# Patient Record
Sex: MALE | Race: WHITE | Employment: FULL TIME | URBAN - METROPOLITAN AREA
[De-identification: names, ages, dates, MRNs, and addresses within clinical notes are randomized per-mention and may not be internally consistent; named-entity substitution may affect disease eponyms.]

---

## 2017-07-24 ENCOUNTER — TELEPHONE (OUTPATIENT)
Dept: INTERNAL MEDICINE CLINIC | Age: 47
End: 2017-07-24

## 2017-07-24 RX ORDER — PREDNISONE 10 MG/1
10 TABLET ORAL SEE ADMIN INSTRUCTIONS
Qty: 21 TAB | Refills: 0 | Status: SHIPPED | OUTPATIENT
Start: 2017-07-24 | End: 2017-08-03

## 2017-08-02 PROBLEM — K76.0 FATTY LIVER: Status: ACTIVE | Noted: 2017-08-02

## 2017-08-02 PROBLEM — E78.5 HYPERLIPIDEMIA: Status: ACTIVE | Noted: 2017-08-02

## 2017-08-02 PROBLEM — M25.562 KNEE PAIN, LEFT: Status: ACTIVE | Noted: 2017-08-02

## 2017-08-02 PROBLEM — E55.9 VITAMIN D DEFICIENCY: Status: ACTIVE | Noted: 2017-08-02

## 2017-08-02 PROBLEM — R73.02 IGT (IMPAIRED GLUCOSE TOLERANCE): Status: ACTIVE | Noted: 2017-08-02

## 2017-08-02 PROBLEM — G47.00 INSOMNIA: Status: ACTIVE | Noted: 2017-08-02

## 2017-08-02 PROBLEM — M77.40 METATARSALGIA: Status: ACTIVE | Noted: 2017-08-02

## 2017-08-02 PROBLEM — M72.2 PLANTAR FASCIITIS: Status: ACTIVE | Noted: 2017-08-02

## 2017-08-02 PROBLEM — G47.33 OSA ON CPAP: Status: ACTIVE | Noted: 2017-08-02

## 2017-08-02 PROBLEM — J34.2 DEVIATED NASAL SEPTUM: Status: ACTIVE | Noted: 2017-08-02

## 2017-08-02 PROBLEM — J30.9 ALLERGIC RHINITIS: Status: ACTIVE | Noted: 2017-08-02

## 2017-08-02 PROBLEM — F32.A DEPRESSION: Status: ACTIVE | Noted: 2017-08-02

## 2017-08-02 PROBLEM — Z99.89 OSA ON CPAP: Status: ACTIVE | Noted: 2017-08-02

## 2017-08-02 PROBLEM — J45.909 ASTHMA: Status: ACTIVE | Noted: 2017-08-02

## 2017-08-02 RX ORDER — ROSUVASTATIN CALCIUM 5 MG/1
TABLET, COATED ORAL
COMMUNITY
End: 2017-11-20 | Stop reason: SDUPTHER

## 2017-08-02 RX ORDER — ASPIRIN 81 MG/1
81 TABLET ORAL DAILY
COMMUNITY

## 2017-08-02 RX ORDER — BISMUTH SUBSALICYLATE 262 MG
1 TABLET,CHEWABLE ORAL DAILY
COMMUNITY

## 2017-08-02 RX ORDER — HYDROCHLOROTHIAZIDE 25 MG/1
25 TABLET ORAL DAILY
COMMUNITY
End: 2018-01-26

## 2017-08-03 PROBLEM — I10 ESSENTIAL HYPERTENSION: Status: ACTIVE | Noted: 2017-08-03

## 2017-08-03 RX ORDER — ALBUTEROL SULFATE 90 UG/1
2 AEROSOL, METERED RESPIRATORY (INHALATION)
COMMUNITY

## 2017-08-03 RX ORDER — CHOLECALCIFEROL (VITAMIN D3) 125 MCG
2000 CAPSULE ORAL DAILY
COMMUNITY

## 2017-08-04 ENCOUNTER — OFFICE VISIT (OUTPATIENT)
Dept: INTERNAL MEDICINE CLINIC | Age: 47
End: 2017-08-04

## 2017-08-04 VITALS
BODY MASS INDEX: 44.09 KG/M2 | HEART RATE: 85 BPM | TEMPERATURE: 98.1 F | WEIGHT: 308 LBS | OXYGEN SATURATION: 96 % | SYSTOLIC BLOOD PRESSURE: 133 MMHG | HEIGHT: 70 IN | DIASTOLIC BLOOD PRESSURE: 88 MMHG

## 2017-08-04 DIAGNOSIS — I10 ESSENTIAL HYPERTENSION: Primary | ICD-10-CM

## 2017-08-04 DIAGNOSIS — R73.02 IGT (IMPAIRED GLUCOSE TOLERANCE): ICD-10-CM

## 2017-08-04 DIAGNOSIS — E78.5 HYPERLIPIDEMIA, UNSPECIFIED HYPERLIPIDEMIA TYPE: ICD-10-CM

## 2017-08-04 DIAGNOSIS — K76.0 FATTY LIVER: ICD-10-CM

## 2017-08-04 DIAGNOSIS — M54.12 CERVICAL RADICULOPATHY: ICD-10-CM

## 2017-08-04 PROBLEM — M17.10 OSTEOARTHROSIS, LOCALIZED, PRIMARY, KNEE: Status: ACTIVE | Noted: 2017-07-27

## 2017-08-04 PROBLEM — S83.231A COMPLEX TEAR OF MEDIAL MENISCUS OF RIGHT KNEE AS CURRENT INJURY: Status: ACTIVE | Noted: 2017-07-27

## 2017-08-04 LAB
ALBUMIN SERPL-MCNC: 4 G/DL (ref 3.9–5.4)
ALKALINE PHOS POC: 124 U/L (ref 38–126)
ALT SERPL-CCNC: 77 U/L (ref 9–52)
AST SERPL-CCNC: 42 U/L (ref 14–36)
BUN BLD-MCNC: 19 MG/DL (ref 9–20)
CALCIUM BLD-MCNC: 9.3 MG/DL (ref 8.4–10.2)
CHLORIDE BLD-SCNC: 104 MMOL/L (ref 98–107)
CO2 POC: 26 MMOL/L (ref 22–32)
CREAT BLD-MCNC: 1 MG/DL (ref 0.8–1.5)
EGFR (POC): 88
GLUCOSE POC: 106 MG/DL (ref 75–110)
POTASSIUM SERPL-SCNC: 4.7 MMOL/L (ref 3.6–5)
PROT SERPL-MCNC: 6.9 G/DL (ref 6.3–8.2)
SODIUM SERPL-SCNC: 143 MMOL/L (ref 137–145)
TOTAL BILIRUBIN POC: 0.7 MG/DL (ref 0.2–1.3)

## 2017-08-04 NOTE — PROGRESS NOTES
Reviewed record in preparation for visit and have obtained necessary documentation. Identified pt with two pt identifiers(name and ). Chief Complaint   Patient presents with    Hypertension     3mth f/u        [facility] Questionnaire:  :     1) Have you been to an emergency room, urgent care clinic since your last visit? no    Hospitalized since your last visit? no        2) Have you seen or consulted any other health care providers outside of [address] [place] [address] since your last visit? Yes, saw Dr William Red and had a mri of the rt knee.

## 2017-08-04 NOTE — PROGRESS NOTES
Subjective:   Amil Gottron is a 50 y.o. male      Chief Complaint   Patient presents with    Hypertension     3mth f/u        History of present illness:  Returns for check of BP which is good. Also to check weight which has not changed. Additionally notes left neck pain radiating into left arm with C spine Xrays showing degenerative changes at multiple levels. Patient Active Problem List   Diagnosis Code    Deviated nasal septum J34.2    TYRESE on CPAP G47.33, Z99.89    Allergic rhinitis J30.9    Plantar fasciitis M72.2    Hyperlipidemia E78.5    BMI 40.0-44.9, adult ([facility] Z68.41    Knee pain, left M25.562    Vitamin D deficiency E55.9    Metatarsalgia M77.40    IGT (impaired glucose tolerance) R73.02    Asthma J45.909    Depression F32.9    Fatty liver K76.0    Insomnia G47.00    Essential hypertension I10    Complex tear of medial meniscus of right knee as current injury S83.231A    Osteoarthrosis, localized, primary, knee M17.10      Past Medical History:   Diagnosis Date    Arthritis     Asthma 8/2/2017    BMI 40.0-44.9, adult ([facility] [address] [facility] 8/2/2017    Depression 8/2/2017    Deviated nasal septum 8/2/2017    Essential hypertension 8/3/2017    Fatty liver 8/2/2017    Hyperlipidemia 8/2/2017    Hypertension     IGT (impaired glucose tolerance) 8/2/2017    Insomnia 8/2/2017    Knee pain, left 8/2/2017    Metatarsalgia 8/2/2017    TYRESE on CPAP 8/2/2017    Plantar fasciitis 8/2/2017    Psychiatric disorder     anxiety and depression    Sleep apnea     cpap    Vitamin D deficiency 8/2/2017      Allergies   Allergen Reactions    Lisinopril Unknown (comments)      Family History   Problem Relation Age of Onset    High Cholesterol Mother     Cancer Father      lung    Other Father      lymphoma      Social History     Social History    Marital status: SINGLE     Spouse name: N/A    Number of children: N/A    Years of education: N/A     Occupational History    Not on file. Social History Main Topics    Smoking status: Never Smoker    Smokeless tobacco: Never Used    Alcohol use No    Drug use: Not on file    Sexual activity: Not on file     Other Topics Concern    Not on file     Social History Narrative     Prior to Admission medications    Medication Sig Start Date End Date Taking? Authorizing Provider   albuterol (PROVENTIL HFA, VENTOLIN HFA, PROAIR HFA) 90 mcg/actuation inhaler Take 2 Puffs by inhalation every four (4) hours as needed for Wheezing. Yes Historical Provider   cholecalciferol, vitamin D3, (VITAMIN D3) 2,000 unit tab Take 2,000 Units by mouth daily. Yes Historical Provider   rosuvastatin (CRESTOR) 5 mg tablet Take  by mouth nightly. Yes Historical Provider   hydroCHLOROthiazide (HYDRODIURIL) 25 mg tablet Take 25 mg by mouth daily. Yes Historical Provider   multivitamin (ONE A DAY) tablet Take 1 Tab by mouth daily. Yes Historical Provider   sodium chloride-aloe vera (AYR SALINE) topical gel Apply  to affected area once. Yes Historical Provider   aspirin delayed-release 81 mg tablet Take  by mouth daily. Yes Historical Provider   OTHER Indications: ALLERGY SHOTS   Yes Historical Provider   losartan (COZAAR) 100 mg tablet Take 100 mg by mouth daily. Indications: HYPERTENSION   Yes Historical Provider   amLODIPine (NORVASC) 5 mg tablet Take 5 mg by mouth daily. Indications: HYPERTENSION   Yes Historical Provider   buPROPion XL (WELLBUTRIN XL) 300 mg XL tablet Take 300 mg by mouth every morning. Indications: ANXIETY WITH DEPRESSION   Yes Historical Provider   meloxicam (MOBIC) 15 mg tablet Take 15 mg by mouth daily. Indications: OSTEOARTHRITIS   Yes Historical Provider   clonazePAM (KLONOPIN) 0.5 mg tablet Take  by mouth nightly as needed. Indications: PANIC DISORDER   Yes Historical Provider        Review of Systems              Constitutional:  He denies fever, weight loss, sweats or fatigue.   HEENT:  No blurred or double vision, headache or dizziness. No difficulty with swallowing, taste, speech or smell. Neck:  Left sided neck pain with radiation left arm  Respiratory:  No cough, wheezing or shortness of breath. No sputum production. Cardiac:  Denies chest pain, palpitations, unexplained indigestion, syncope, edema, PND or orthopnea. GI:  No changes in bowel movements, no abdominal pain, no bloating, anorexia, nausea, vomiting or heartburn. :  No frequency or dysuria. Denies incontinence. Extremities:  Bilateral knee pain, stiffness or swelling. Seeing Dr. Justice Gilliam  Skin:  No recent rashes or mole changes. Neurological:  No numbness, tingling, burning paresthesias or loss of motor strength. No syncope, dizziness, frequent headaches or memory loss. Objective:     Vitals:    08/04/17 0925   BP: 133/88   Pulse: 85   Temp: 98.1 °F (36.7 °C)   TempSrc: Oral   SpO2: 96%   Weight: 308 lb (139.7 kg)   Height: 5' 10\" (1.778 m)   PainSc:   5   PainLoc: Neck       Body mass index is 44.19 kg/(m^2). Physical Examination:              General Appearance:  Well-developed, well-nourished, no acute  distress. HEENT:      Ears:  The TMs and ear canals were clear. Eyes:  The pupillary responses were normal.  Extraocular muscle function intact. Lids and conjunctiva not injected. Neck:  Supple without thyromegaly or adenopathy. No JVD noted. Lungs:  Clear to auscultation and percussion. Cardiac:  Regular rate and rhythm without murmur. GI: nontender w/o mass. Normal BS's. Extremities:  No clubbing, cyanosis or edema. Skin:  No rash or unusual mole changes noted. Neurological:  Grossly normal.            Assessment/Plan:   Impressions:      ICD-10-CM ICD-9-CM    1. Essential hypertension I10 401.9 AMB POC COMPREHENSIVE METABOLIC PANEL   2. Hyperlipidemia, unspecified hyperlipidemia type E78.5 272.4 AMB POC COMPREHENSIVE METABOLIC PANEL   3.  IGT (impaired glucose tolerance) R73.02 790.22 AMB POC COMPREHENSIVE METABOLIC PANEL 4. Fatty liver K76.0 571.8 AMB POC COMPREHENSIVE METABOLIC PANEL   5. Cervical radiculopathy M54.12 723.4 XR SPINE CERV 4 OR 5 V      MRI CERV SPINE WO CONT        Plan:  1. Continue present meds  2. Lifestyle modifications including Na restriction, low carb/fat diet, weight reduction and exercise (at least a walking program). Follow-up Disposition:  Return for After procedure (MRI). Orders Placed This Encounter    XR SPINE CERV 4 OR 5 V     Standing Status:   Future     Number of Occurrences:   1     Standing Expiration Date:   9/4/2018     Order Specific Question:   Reason for Exam     Answer:   neck and left arm pain    MRI CERV SPINE WO CONT     Standing Status:   Future     Standing Expiration Date:   9/4/2018     Order Specific Question:   Is Patient Allergic to Contrast Dye?      Answer:   No    AMB POC COMPREHENSIVE METABOLIC PANEL       Rebeca Coyle MD

## 2017-08-12 ENCOUNTER — HOSPITAL ENCOUNTER (OUTPATIENT)
Dept: MRI IMAGING | Age: 47
Discharge: HOME | End: 2017-08-12
Attending: INTERNAL MEDICINE
Payer: COMMERCIAL

## 2017-08-12 DIAGNOSIS — M54.12 CERVICAL RADICULOPATHY: ICD-10-CM

## 2017-08-12 PROCEDURE — 72141 MRI NECK SPINE W/O DYE: CPT

## 2017-08-17 ENCOUNTER — OFFICE VISIT (OUTPATIENT)
Dept: INTERNAL MEDICINE CLINIC | Age: 47
End: 2017-08-17

## 2017-08-17 VITALS
WEIGHT: 308 LBS | TEMPERATURE: 98.2 F | SYSTOLIC BLOOD PRESSURE: 128 MMHG | DIASTOLIC BLOOD PRESSURE: 80 MMHG | HEIGHT: 70 IN | HEART RATE: 90 BPM | OXYGEN SATURATION: 96 % | BODY MASS INDEX: 44.09 KG/M2

## 2017-08-17 DIAGNOSIS — M54.12 CERVICAL RADICULOPATHY AT C6: Primary | ICD-10-CM

## 2017-08-17 NOTE — PROGRESS NOTES
Subjective:   Jennifer Bojorquez is a 50 y.o. male      Chief Complaint   Patient presents with    Follow-up     mri        History of present illness: Mr. Manuel Mcclellan returns in follow up. He continues to have problems with pain radiating from his left neck down the posterior aspect of his left arm. He feels that there may be some weakness. He notes numbness and tingling that extends out into the tip of his third finger as well. Cervical spine MRI revealed a disc protrusion at C6-7, which I think is the culprit area. With persistent symptoms that did not respond to a dose pack, I think he should see the neurosurgeon early. The choice can then be made as to whether conservative therapy, including physical therapy, can be followed, versus a surgical approach. He has not done much in terms of exercise and weight loss. Blood pressure is adequate today, however. He really needs to get working on his weight as his knee continues to be a problem as well and at this point the orthopedist does not feel it is worthy of surgery and that his best bet is weight reduction to improve the knee. We will let him see neurosurgery. He will work on his weight. He will be back here in November for his comprehensive examination.         Patient Active Problem List   Diagnosis Code    Deviated nasal septum J34.2    TYRESE on CPAP G47.33, Z99.89    Allergic rhinitis J30.9    Plantar fasciitis M72.2    Hyperlipidemia E78.5    BMI 40.0-44.9, adult ([facility] [address] Z68.41    Knee pain, left M25.562    Vitamin D deficiency E55.9    Metatarsalgia M77.40    IGT (impaired glucose tolerance) R73.02    Asthma J45.909    Depression F32.9    Fatty liver K76.0    Insomnia G47.00    Essential hypertension I10    Complex tear of medial meniscus of right knee as current injury S83.231A    Osteoarthrosis, localized, primary, knee M17.10      Past Medical History:   Diagnosis Date    Arthritis     Asthma 8/2/2017    BMI 40.0-44.9, adult ([address] 8/2/2017    Depression 8/2/2017    Deviated nasal septum 8/2/2017    Essential hypertension 8/3/2017    Fatty liver 8/2/2017    Hyperlipidemia 8/2/2017    Hypertension     IGT (impaired glucose tolerance) 8/2/2017    Insomnia 8/2/2017    Knee pain, left 8/2/2017    Metatarsalgia 8/2/2017    TYRESE on CPAP 8/2/2017    Plantar fasciitis 8/2/2017    Psychiatric disorder     anxiety and depression    Sleep apnea     cpap    Vitamin D deficiency 8/2/2017      Allergies   Allergen Reactions    Lisinopril Unknown (comments)      Family History   Problem Relation Age of Onset    High Cholesterol Mother     Cancer Father      lung    Other Father      lymphoma      Social History     Social History    Marital status: SINGLE     Spouse name: N/A    Number of children: N/A    Years of education: N/A     Occupational History    Not on file. Social History Main Topics    Smoking status: Never Smoker    Smokeless tobacco: Never Used    Alcohol use No    Drug use: Not on file    Sexual activity: Not on file     Other Topics Concern    Not on file     Social History Narrative     Prior to Admission medications    Medication Sig Start Date End Date Taking? Authorizing Provider   albuterol (PROVENTIL HFA, VENTOLIN HFA, PROAIR HFA) 90 mcg/actuation inhaler Take 2 Puffs by inhalation every four (4) hours as needed for Wheezing. Yes Historical Provider   cholecalciferol, vitamin D3, (VITAMIN D3) 2,000 unit tab Take 2,000 Units by mouth daily. Yes Historical Provider   rosuvastatin (CRESTOR) 5 mg tablet Take  by mouth nightly. Yes Historical Provider   hydroCHLOROthiazide (HYDRODIURIL) 25 mg tablet Take 25 mg by mouth daily. Yes Historical Provider   multivitamin (ONE A DAY) tablet Take 1 Tab by mouth daily. Yes Historical Provider   sodium chloride-aloe vera (AYR SALINE) topical gel Apply  to affected area once. Yes Historical Provider   aspirin delayed-release 81 mg tablet Take  by mouth daily. Yes Historical Provider   OTHER Indications: ALLERGY SHOTS   Yes Historical Provider   losartan (COZAAR) 100 mg tablet Take 100 mg by mouth daily. Indications: HYPERTENSION   Yes Historical Provider   amLODIPine (NORVASC) 5 mg tablet Take 5 mg by mouth daily. Indications: HYPERTENSION   Yes Historical Provider   buPROPion XL (WELLBUTRIN XL) 300 mg XL tablet Take 300 mg by mouth every morning. Indications: ANXIETY WITH DEPRESSION   Yes Historical Provider   meloxicam (MOBIC) 15 mg tablet Take 15 mg by mouth daily. Indications: OSTEOARTHRITIS   Yes Historical Provider   clonazePAM (KLONOPIN) 0.5 mg tablet Take  by mouth nightly as needed. Indications: PANIC DISORDER   Yes Historical Provider        Review of Systems              Constitutional:  He denies fever, weight loss, sweats or fatigue. HEENT:  No blurred or double vision, headache or dizziness. No difficulty with swallowing, taste, speech or smell. Respiratory:  No cough, wheezing or shortness of breath. No sputum production. Cardiac:  Denies chest pain, palpitations, unexplained indigestion, syncope, edema, PND or orthopnea. GI:  No changes in bowel movements, no abdominal pain, no bloating, anorexia, nausea, vomiting or heartburn. :  No frequency or dysuria. Denies incontinence. Extremities:  No joint pain, stiffness or swelling. Skin:  No recent rashes or mole changes. Neurological:  Numbness and pain in radicular distribution left posterior arm    Objective:     Vitals:    08/17/17 1558   BP: 128/80   Pulse: 90   Temp: 98.2 °F (36.8 °C)   TempSrc: Oral   SpO2: 96%   Weight: 308 lb (139.7 kg)   Height: 5' 10\" (1.778 m)       Body mass index is 44.19 kg/(m^2). Physical Examination:              General Appearance:  Well-developed, well-nourished, no acute  distress. HEENT:      Ears:  The TMs and ear canals were clear. Eyes:  The pupillary responses were normal.  Extraocular muscle function intact.   Lids and conjunctiva not injected. Neck:  Supple without thyromegaly or adenopathy. No JVD noted. Lungs:  Clear to auscultation and percussion. Cardiac:  Regular rate and rhythm without murmur. GI: nontender w/o mass. Normal BS's. Extremities:  No clubbing, cyanosis or edema. Skin:  No rash or unusual mole changes noted. Neurological:  Weakness left biceps. Assessment/Plan:   Impressions:      ICD-10-CM ICD-9-CM    1. Cervical radiculopathy at C6 M54.12 723.4 REFERRAL TO NEUROSURGERY        Plan:  1. Continue present meds  2. Lifestyle modifications including Na restriction, low carb/fat diet, weight reduction and exercise (at least a walking program). Follow-up Disposition:  Return in about 3 months (around 11/17/2017) for CPE.       Orders Placed This Encounter    REFERRAL TO NEUROSURGERY     Referral Priority:   Routine     Referral Type:   Consultation     Referral Reason:   Specialty Services Required     Requested Specialty:   Neurosurgery       Iveth Andres MD

## 2017-08-17 NOTE — PROGRESS NOTES
1. Have you been to the ER, urgent care clinic since your last visit? Hospitalized since your last visit? No    2. Have you seen or consulted any other health care providers outside of the [address] [place] [address] since your last visit? Include any pap smears or colon screening.  No

## 2017-09-06 RX ORDER — BUPROPION HYDROCHLORIDE 300 MG/1
TABLET ORAL
Qty: 90 TAB | Refills: 1 | Status: SHIPPED | OUTPATIENT
Start: 2017-09-06 | End: 2018-02-22 | Stop reason: SDUPTHER

## 2017-10-10 RX ORDER — MELOXICAM 15 MG/1
TABLET ORAL
Qty: 30 TAB | Refills: 3 | Status: SHIPPED | OUTPATIENT
Start: 2017-10-10 | End: 2018-01-08 | Stop reason: SDUPTHER

## 2017-10-12 RX ORDER — CLONAZEPAM 0.5 MG/1
0.5 TABLET ORAL
Qty: 60 TAB | Refills: 3 | Status: SHIPPED | OUTPATIENT
Start: 2017-10-12 | End: 2017-11-09

## 2017-11-02 ENCOUNTER — LAB ONLY (OUTPATIENT)
Dept: INTERNAL MEDICINE CLINIC | Age: 47
End: 2017-11-02

## 2017-11-02 DIAGNOSIS — R73.02 IGT (IMPAIRED GLUCOSE TOLERANCE): ICD-10-CM

## 2017-11-02 DIAGNOSIS — Z00.00 ROUTINE GENERAL MEDICAL EXAMINATION AT A HEALTH CARE FACILITY: Primary | ICD-10-CM

## 2017-11-02 DIAGNOSIS — E78.5 HYPERLIPIDEMIA, UNSPECIFIED HYPERLIPIDEMIA TYPE: ICD-10-CM

## 2017-11-02 LAB
ALBUMIN SERPL-MCNC: 4.4 G/DL (ref 3.9–5.4)
ALKALINE PHOS POC: 119 U/L (ref 38–126)
ALT SERPL-CCNC: 73 U/L (ref 9–52)
AST SERPL-CCNC: 37 U/L (ref 14–36)
BACTERIA UA POCT, BACTPOCT: NORMAL
BILIRUB UR QL STRIP: NEGATIVE
BUN BLD-MCNC: 17 MG/DL (ref 9–20)
CALCIUM BLD-MCNC: 9.4 MG/DL (ref 8.4–10.2)
CASTS UA POCT: NORMAL
CHLORIDE BLD-SCNC: 102 MMOL/L (ref 98–107)
CHOLEST SERPL-MCNC: 98 MG/DL (ref 0–200)
CK (CPK) POC: 120 U/L (ref 30–135)
CLUE CELLS, CLUEPOCT: NEGATIVE
CO2 POC: 28 MMOL/L (ref 22–32)
CREAT BLD-MCNC: 1.1 MG/DL (ref 0.8–1.5)
CRYSTALS UA POCT, CRYSPOCT: NEGATIVE
EGFR (POC): 78.4
EPITHELIAL CELLS POCT: NORMAL
GLUCOSE POC: 108 MG/DL (ref 75–110)
GLUCOSE UR-MCNC: NEGATIVE MG/DL
GRAN# POC: 4.4 K/UL (ref 2–7.8)
GRAN% POC: 67.8 % (ref 37–92)
HBA1C MFR BLD HPLC: 6.1 % (ref 4.5–5.7)
HCT VFR BLD CALC: 49.1 % (ref 37–51)
HDLC SERPL-MCNC: 36 MG/DL (ref 35–130)
HGB BLD-MCNC: 16.3 G/DL (ref 12–18)
IRON POC: 74 UG/DL (ref 49–181)
IRON SATURATION POC: 16 % (ref 15–55)
KETONES P FAST UR STRIP-MCNC: NEGATIVE MG/DL
LDL CHOLESTEROL POC: 42.8 MG/DL (ref 0–130)
LY# POC: 1.7 K/UL (ref 0.6–4.1)
LY% POC: 28 % (ref 10–58.5)
MCH RBC QN: 29.1 PG (ref 26–32)
MCHC RBC-ENTMCNC: 33.1 G/DL (ref 30–36)
MCV RBC: 88 FL (ref 80–97)
MID #, POC: 0.2 K/UL (ref 0–1.8)
MID% POC: 4.2 % (ref 0.1–24)
MUCUS UA POCT, MUCPOCT: NORMAL
PH UR STRIP: 6 [PH] (ref 5–7)
PLATELET # BLD: 207 K/UL (ref 140–440)
POTASSIUM SERPL-SCNC: 3.8 MMOL/L (ref 3.6–5)
PROT SERPL-MCNC: 7.2 G/DL (ref 6.3–8.2)
PROT UR QL STRIP: NEGATIVE MG/DL
PSA SERPL-MCNC: 0.3 NG/ML (ref 0–4)
RBC # BLD: 5.59 M/UL (ref 4.2–6.3)
RBC UA POCT, RBCPOCT: NORMAL
SODIUM SERPL-SCNC: 143 MMOL/L (ref 137–145)
SP GR UR STRIP: 1.02 (ref 1.01–1.02)
TCHOL/HDL RATIO (POC): 2.7 (ref 0–4)
TIBC POC: 452 UG/DL (ref 260–462)
TOTAL BILIRUBIN POC: 0.5 MG/DL (ref 0.2–1.3)
TRICH UA POCT, TRICHPOC: NEGATIVE
TRIGL SERPL-MCNC: 96 MG/DL (ref 0–200)
TSH BLD-ACNC: 2.09 UIU/ML (ref 0.4–4.2)
UA UROBILINOGEN AMB POC: NORMAL (ref 0.2–1)
URINALYSIS CLARITY POC: CLEAR
URINALYSIS COLOR POC: NORMAL
URINE BLOOD POC: NEGATIVE
URINE CULT COMMENT, POCT: NORMAL
URINE LEUKOCYTES POC: NEGATIVE
URINE NITRITES POC: NEGATIVE
VITAMIN D POC: 45 NG/ML (ref 30–96)
VLDLC SERPL CALC-MCNC: 19.2 MG/DL
WBC # BLD: 6.3 K/UL (ref 4.1–10.9)
WBC UA POCT, WBCPOCT: NORMAL
YEAST UA POCT, YEASTPOC: NEGATIVE

## 2017-11-07 PROBLEM — M54.12 CERVICAL RADICULOPATHY: Status: ACTIVE | Noted: 2017-11-07

## 2017-11-09 ENCOUNTER — OFFICE VISIT (OUTPATIENT)
Dept: INTERNAL MEDICINE CLINIC | Age: 47
End: 2017-11-09

## 2017-11-09 VITALS
SYSTOLIC BLOOD PRESSURE: 128 MMHG | DIASTOLIC BLOOD PRESSURE: 84 MMHG | OXYGEN SATURATION: 97 % | RESPIRATION RATE: 14 BRPM | WEIGHT: 301 LBS | TEMPERATURE: 98.7 F | HEART RATE: 80 BPM | BODY MASS INDEX: 43.09 KG/M2 | HEIGHT: 70 IN

## 2017-11-09 DIAGNOSIS — Z23 ENCOUNTER FOR IMMUNIZATION: Primary | ICD-10-CM

## 2017-11-09 DIAGNOSIS — R73.02 IGT (IMPAIRED GLUCOSE TOLERANCE): ICD-10-CM

## 2017-11-09 DIAGNOSIS — I10 ESSENTIAL HYPERTENSION: ICD-10-CM

## 2017-11-09 DIAGNOSIS — G47.33 OSA ON CPAP: ICD-10-CM

## 2017-11-09 DIAGNOSIS — F50.9 EATING DISORDER: ICD-10-CM

## 2017-11-09 DIAGNOSIS — Z99.89 OSA ON CPAP: ICD-10-CM

## 2017-11-09 DIAGNOSIS — E78.5 HYPERLIPIDEMIA, UNSPECIFIED HYPERLIPIDEMIA TYPE: ICD-10-CM

## 2017-11-09 RX ORDER — CLONAZEPAM 0.5 MG/1
1 TABLET ORAL
COMMUNITY
End: 2017-11-20 | Stop reason: SDUPTHER

## 2017-11-09 RX ORDER — TOPIRAMATE 25 MG/1
TABLET ORAL
Qty: 60 TAB | Refills: 0 | Status: SHIPPED | OUTPATIENT
Start: 2017-11-09 | End: 2017-12-18 | Stop reason: SDUPTHER

## 2017-11-09 NOTE — PROGRESS NOTES
Jermaine Jeter who present for routine immunization, flu shot. He denies any symptoms , reactions or allergies that would exclude them from being immunized today. Risks and adverse reactions were discussed and the VIS was given to them. All questions were addressed. He was observed for 15 min post injection. There were no reactions observed. Calista Hansen LPN  . Reviewed record in preparation for visit and have obtained necessary documentation. Identified pt with two pt identifiers(name and ). Neck Circ (inches): 17.5    Waist Measurement: 52 Inches    Chief Complaint   Patient presents with    Complete Physical        Coordination of Care Questionnaire:  :     1) Have you been to an emergency room, urgent care clinic since your last visit? no   Hospitalized since your last visit? no             2) Have you seen or consulted any other health care providers outside of [address] [place] [address] since your last visit?  Yes, Dr Gi Fernández

## 2017-11-09 NOTE — PATIENT INSTRUCTIONS
Vaccine Information Statement    Influenza (Flu) Vaccine (Inactivated or Recombinant): What you need to know    Many Vaccine Information Statements are available in Sets[place] and other languages. See www.immunize.org/vis  Hojas de Información Sobre Vacunas están disponibles en Español y en muchos otros idiomas. Visite www.immunize.org/vis    1. Why get vaccinated? Influenza (flu) is a contagious disease that spreads around the [place] every year, usually between October and May. Flu is caused by influenza viruses, and is spread mainly by coughing, sneezing, and close contact. Anyone can get flu. Flu strikes suddenly and can last several days. Symptoms vary by age, but can include:   fever/chills   sore throat   muscle aches   fatigue   cough   headache    runny or stuffy nose    Flu can also lead to pneumonia and blood infections, and cause diarrhea and seizures in children. If you have a medical condition, such as heart or lung disease, flu can make it worse. Flu is more dangerous for some people. Infants and young children, people 72years of age and older, pregnant women, and people with certain health conditions or a weakened immune system are at greatest risk. Each year thousands of people in the [facility] die from flu, and many more are hospitalized. Flu vaccine can:   keep you from getting flu,   make flu less severe if you do get it, and   keep you from spreading flu to your family and other people. 2. Inactivated and recombinant flu vaccines    A dose of flu vaccine is recommended every flu season. Children 6 months through 6years of age may need two doses during the same flu season. Everyone else needs only one dose each flu season.        Some inactivated flu vaccines contain a very small amount of a mercury-based preservative called thimerosal. Studies have not shown thimerosal in vaccines to be harmful, but flu vaccines that do not contain thimerosal are available. There is no live flu virus in flu shots. They cannot cause the flu. There are many flu viruses, and they are always changing. Each year a new flu vaccine is made to protect against three or four viruses that are likely to cause disease in the upcoming flu season. But even when the vaccine doesnt exactly match these viruses, it may still provide some protection    Flu vaccine cannot prevent:   flu that is caused by a virus not covered by the vaccine, or   illnesses that look like flu but are not. It takes about 2 weeks for protection to develop after vaccination, and protection lasts through the flu season. 3. Some people should not get this vaccine    Tell the person who is giving you the vaccine:     If you have any severe, life-threatening allergies. If you ever had a life-threatening allergic reaction after a dose of flu vaccine, or have a severe allergy to any part of this vaccine, you may be advised not to get vaccinated. Most, but not all, types of flu vaccine contain a small amount of egg protein.  If you ever had Guillain-Barré Syndrome (also called GBS). Some people with a history of GBS should not get this vaccine. This should be discussed with your doctor.  If you are not feeling well. It is usually okay to get flu vaccine when you have a mild illness, but you might be asked to come back when you feel better. 4. Risks of a vaccine reaction    With any medicine, including vaccines, there is a chance of reactions. These are usually mild and go away on their own, but serious reactions are also possible. Most people who get a flu shot do not have any problems with it.      Minor problems following a flu shot include:    soreness, redness, or swelling where the shot was given     hoarseness   sore, red or itchy eyes   cough   fever   aches   headache   itching   fatigue  If these problems occur, they usually begin soon after the shot and last 1 or 2 days. More serious problems following a flu shot can include the following:     There may be a small increased risk of Guillain-Barré Syndrome (GBS) after inactivated flu vaccine. This risk has been estimated at 1 or 2 additional cases per million people vaccinated. This is much lower than the risk of severe complications from flu, which can be prevented by flu vaccine.  Young children who get the flu shot along with pneumococcal vaccine (PCV13) and/or DTaP vaccine at the same time might be slightly more likely to have a seizure caused by fever. Ask your doctor for more information. Tell your doctor if a child who is getting flu vaccine has ever had a seizure. Problems that could happen after any injected vaccine:      People sometimes faint after a medical procedure, including vaccination. Sitting or lying down for about 15 minutes can help prevent fainting, and injuries caused by a fall. Tell your doctor if you feel dizzy, or have vision changes or ringing in the ears.  Some people get severe pain in the shoulder and have difficulty moving the arm where a shot was given. This happens very rarely.  Any medication can cause a severe allergic reaction. Such reactions from a vaccine are very rare, estimated at about 1 in a million doses, and would happen within a few minutes to a few hours after the vaccination. As with any medicine, there is a very remote chance of a vaccine causing a serious injury or death. The safety of vaccines is always being monitored. For more information, visit: www.cdc.gov/vaccinesafety/    5. What if there is a serious reaction? What should I look for?  Look for anything that concerns you, such as signs of a severe allergic reaction, very high fever, or unusual behavior.     Signs of a severe allergic reaction can include hives, swelling of the face and throat, difficulty breathing, a fast heartbeat, dizziness, and weakness - usually within a few minutes to a few hours after the vaccination. What should I do?  If you think it is a severe allergic reaction or other emergency that cant wait, call 9-1-1 and get the person to the nearest hospital. Otherwise, call your doctor.  Reactions should be reported to the Vaccine Adverse Event Reporting System (VAERS). Your doctor should file this report, or you can do it yourself through  the VAERS web site at www.vaers. [facility].gov, or by calling 0-496.449.4021. VAERS does not give medical advice. 6. The National Vaccine Injury Compensation Program    The [facility] Vaccine Injury Compensation Program (VICP) is a federal program that was created to compensate people who may have been injured by certain vaccines. Persons who believe they may have been injured by a vaccine can learn about the program and about filing a claim by calling 8-346.697.6541 or visiting the [employer] website at www.[facility].gov/vaccinecompensation. There is a time limit to file a claim for compensation. 7. How can I learn more?  Ask your healthcare provider. He or she can give you the vaccine package insert or suggest other sources of information.  Call your local or state health department.  Contact the Centers for Disease Control and Prevention (CDC):  - Call 2-301.490.3354 (1-800-CDC-INFO) or  - Visit CDCs website at www.cdc.gov/flu    Vaccine Information Statement   Inactivated Influenza Vaccine   8/7/2015  [address]ia Abernathy [id number]    Department of Health and Human Services  Centers for Disease Control and Prevention    Office Use Only

## 2017-11-20 RX ORDER — CLONAZEPAM 0.5 MG/1
1 TABLET ORAL
Qty: 180 TAB | Refills: 3 | Status: SHIPPED | OUTPATIENT
Start: 2017-11-20 | End: 2018-08-18 | Stop reason: SDUPTHER

## 2017-11-20 RX ORDER — ROSUVASTATIN CALCIUM 5 MG/1
TABLET, COATED ORAL
Qty: 30 TAB | Refills: 7 | Status: SHIPPED | OUTPATIENT
Start: 2017-11-20 | End: 2018-05-18

## 2017-11-20 NOTE — TELEPHONE ENCOUNTER
Requested Prescriptions     Pending Prescriptions Disp Refills    clonazePAM (KLONOPIN) 0.5 mg tablet 180 Tab 3     Sig: Take 2 Tabs by mouth nightly as needed. Max Daily Amount: 1 mg.

## 2017-12-10 PROBLEM — Z00.00 PE (PHYSICAL EXAM), ANNUAL: Status: ACTIVE | Noted: 2017-12-10

## 2017-12-11 ENCOUNTER — OFFICE VISIT (OUTPATIENT)
Dept: INTERNAL MEDICINE CLINIC | Age: 47
End: 2017-12-11

## 2017-12-11 VITALS
SYSTOLIC BLOOD PRESSURE: 118 MMHG | HEART RATE: 80 BPM | BODY MASS INDEX: 42.15 KG/M2 | OXYGEN SATURATION: 97 % | HEIGHT: 70 IN | DIASTOLIC BLOOD PRESSURE: 82 MMHG | WEIGHT: 294.4 LBS | TEMPERATURE: 98 F

## 2017-12-11 DIAGNOSIS — I10 ESSENTIAL HYPERTENSION: Primary | ICD-10-CM

## 2017-12-11 DIAGNOSIS — S83.231D COMPLEX TEAR OF MEDIAL MENISCUS OF RIGHT KNEE AS CURRENT INJURY, SUBSEQUENT ENCOUNTER: ICD-10-CM

## 2017-12-11 NOTE — PROGRESS NOTES
Reviewed record in preparation for visit and have obtained necessary documentation. Identified pt with two pt identifiers(name and ). No chief complaint on file. Coordination of Care Questionnaire:  :     1) Have you been to an emergency room, urgent care clinic since your last visit? No     Hospitalized since your last visit? No               2) Have you seen or consulted any other health care providers outside of [address] [place] [address] since your last visit?  No

## 2017-12-11 NOTE — PROGRESS NOTES
Subjective:   Sweta Ferraro is a 50 y.o. male      No chief complaint on file. History of present illness:  Raffaele Funes returns in follow up. He's done remarkably well with another 7 pound weight reduction since starting the Topamax. He can identify that his appetite is less. He's not exercising still because of his knee issues. He did see Dr. Deacon Thornton after his last visit here. Dr. Deacon Thornton told him that it was our call as to when he would proceed with surgery, but both Raffaele Funes and I feel that if he can get further weight loss that would be beneficial to him before the timing of the surgery. He has really only been taking Topamax once a day as he's finding it difficult to take it twice a day and concerned that taking it in the morning might make him drowsy. I told him to go ahead and take it twice a day and see if he can get more benefit from it that way. I don't think it will cause him that much drowsiness in the morning, but he'll see. If it does cause drowsiness I would increase it to the full 50 mg at bedtime. He'll check back here in a month's time and see if we have gotten further weight loss and continued success. He denies other new cardiorespiratory, GI or  symptoms.         Patient Active Problem List    Diagnosis Date Noted    Essential hypertension 08/03/2017     Priority: 1 - One    Hyperlipidemia 08/02/2017     Priority: 1 - One    Cervical radiculopathy 11/07/2017     Priority: 2 - Two    TYRESE on CPAP 08/02/2017     Priority: 2 - Two    Knee pain, left 08/02/2017     Priority: 2 - Two    IGT (impaired glucose tolerance) 08/02/2017     Priority: 2 - Two    Depression 08/02/2017     Priority: 2 - Two    Fatty liver 08/02/2017     Priority: 2 - Two    Allergic rhinitis 08/02/2017     Priority: 3 - Three    Plantar fasciitis 08/02/2017     Priority: 3 - Three    BMI 40.0-44.9, adult ([facility] [address] 08/02/2017     Priority: 3 - Three    Asthma 08/02/2017     Priority: 3 - Three    Complex tear of medial meniscus of right knee as current injury 07/27/2017     Priority: 3 - Three    Osteoarthrosis, localized, primary, knee 07/27/2017     Priority: 3 - Three    Deviated nasal septum 08/02/2017     Priority: 4 - Four    Vitamin D deficiency 08/02/2017     Priority: 4 - Four    Metatarsalgia 08/02/2017     Priority: 4 - Four    Insomnia 08/02/2017     Priority: 4 - Four    PE (physical exam), annual 12/10/2017      Past Surgical History:   Procedure Laterality Date    HX KNEE ARTHROSCOPY Left     med menisectomy    HX ORTHOPAEDIC  1999 2006    bilateral shoulder decompressions    HX REFRACTIVE SURGERY Bilateral 2006      Allergies   Allergen Reactions    Lisinopril Unknown (comments)      Family History   Problem Relation Age of Onset    High Cholesterol Mother     Cancer Father      lung    Other Father      lymphoma      Social History     Social History    Marital status: SINGLE     Spouse name: N/A    Number of children: N/A    Years of education: N/A     Occupational History    Not on file. Social History Main Topics    Smoking status: Never Smoker    Smokeless tobacco: Never Used    Alcohol use No    Drug use: Not on file    Sexual activity: Not on file     Other Topics Concern    Not on file     Social History Narrative     Outpatient Prescriptions Marked as Taking for the 12/11/17 encounter (Office Visit) with Karen Avila MD   Medication Sig Dispense Refill    clonazePAM (KLONOPIN) 0.5 mg tablet Take 2 Tabs by mouth nightly as needed. Max Daily Amount: 1 mg.  180 Tab 3    rosuvastatin (CRESTOR) 5 mg tablet TAKE ONE TABLET BY MOUTH EVERY DAY 30 Tab 7    topiramate (TOPAMAX) 25 mg tablet 1 tab at bedtime x 2 weeks then 1 tab twice a day 60 Tab 0    meloxicam (MOBIC) 15 mg tablet TAKE 1 TABLET EVERY DAY 30 Tab 3    buPROPion XL (WELLBUTRIN XL) 300 mg XL tablet TAKE 1 TABLET BY MOUTH DAILY 90 Tab 1    albuterol (PROVENTIL HFA, VENTOLIN HFA, PROAIR HFA) 90 mcg/actuation inhaler Take 2 Puffs by inhalation every four (4) hours as needed for Wheezing.  cholecalciferol, vitamin D3, (VITAMIN D3) 2,000 unit tab Take 2,000 Units by mouth daily.  hydroCHLOROthiazide (HYDRODIURIL) 25 mg tablet Take 25 mg by mouth daily.  multivitamin (ONE A DAY) tablet Take 1 Tab by mouth daily.  sodium chloride-aloe vera (AYR SALINE) topical gel Apply  to affected area once.  aspirin delayed-release 81 mg tablet Take 81 mg by mouth daily.  OTHER Indications: ALLERGY SHOTS      losartan (COZAAR) 100 mg tablet Take 100 mg by mouth daily. Indications: HYPERTENSION      amLODIPine (NORVASC) 5 mg tablet Take 5 mg by mouth daily. Indications: HYPERTENSION          Review of Systems              Constitutional:  He denies fever, weight loss, sweats or fatigue. HEENT:  No blurred or double vision, headache or dizziness. No difficulty with swallowing, taste, speech or smell. Respiratory:  No cough, wheezing or shortness of breath. No sputum production. Cardiac:  Denies chest pain, palpitations, unexplained indigestion, syncope, edema, PND or orthopnea. GI:  No changes in bowel movements, no abdominal pain, no bloating, anorexia, nausea, vomiting or heartburn. :  No frequency or dysuria. Denies incontinence. Extremities:  No joint pain, stiffness or swelling. Skin:  No recent rashes or mole changes. Neurological:  No numbness, tingling, burning paresthesias or loss of motor strength. No syncope, dizziness, frequent headaches or memory loss. Objective:     Vitals:    12/11/17 1451   BP: 118/82   Pulse: 80   Temp: 98 °F (36.7 °C)   TempSrc: Oral   SpO2: 97%   Weight: 294 lb 6.4 oz (133.5 kg)   Height: 5' 10\" (1.778 m)   PainSc:   0 - No pain       Body mass index is 42.24 kg/(m^2). Physical Examination:              General Appearance:  Well-developed, well-nourished, no acute  distress. HEENT:      Ears:  The TMs and ear canals were clear. Eyes:   The pupillary responses were normal.  Extraocular muscle function intact. Lids and conjunctiva not injected. Neck:  Supple without thyromegaly or adenopathy. No JVD noted. Lungs:  Clear to auscultation and percussion. Cardiac:  Regular rate and rhythm without murmur. GI: nontender w/o mass. Normal BS's. Extremities:  No clubbing, cyanosis or edema. Skin:  No rash or unusual mole changes noted. Neurological:  Grossly normal.            Assessment/Plan:   Impressions:      ICD-10-CM ICD-9-CM    1. Essential hypertension I10 401.9    2. [facility] 40.0-44.9, [facility] Z68.41 V85.41    3. Complex tear of medial meniscus of right knee as current injury, subsequent encounter S83.231D V58.89         Plan:  1. Continue present meds  2. Lifestyle modifications including Na restriction, low carb/fat diet, weight reduction and exercise (at least a walking program). 3. He will hold on surgery right knee pending further weight loss  4. Check LFT's at [facility]        Follow-up Disposition:  Return in about 1 month (around 1/11/2018). No orders of the defined types were placed in this encounter.       Harinder West MD

## 2017-12-19 RX ORDER — TOPIRAMATE 25 MG/1
TABLET ORAL
Qty: 60 TAB | Refills: 0 | Status: SHIPPED | OUTPATIENT
Start: 2017-12-19 | End: 2018-01-11

## 2018-01-08 RX ORDER — MELOXICAM 15 MG/1
TABLET ORAL
Qty: 30 TAB | Refills: 3 | Status: SHIPPED | OUTPATIENT
Start: 2018-01-08 | End: 2018-01-16 | Stop reason: SDUPTHER

## 2018-01-11 ENCOUNTER — OFFICE VISIT (OUTPATIENT)
Dept: INTERNAL MEDICINE CLINIC | Age: 48
End: 2018-01-11

## 2018-01-11 VITALS
RESPIRATION RATE: 18 BRPM | SYSTOLIC BLOOD PRESSURE: 132 MMHG | HEART RATE: 80 BPM | WEIGHT: 288.6 LBS | OXYGEN SATURATION: 95 % | DIASTOLIC BLOOD PRESSURE: 79 MMHG | BODY MASS INDEX: 41.32 KG/M2 | TEMPERATURE: 98.3 F | HEIGHT: 70 IN

## 2018-01-11 DIAGNOSIS — F33.9 RECURRENT DEPRESSION (HCC): ICD-10-CM

## 2018-01-11 DIAGNOSIS — J18.9 PNEUMONIA OF RIGHT MIDDLE LOBE DUE TO INFECTIOUS ORGANISM: ICD-10-CM

## 2018-01-11 DIAGNOSIS — R06.02 SOB (SHORTNESS OF BREATH): Primary | ICD-10-CM

## 2018-01-11 DIAGNOSIS — R06.01 ORTHOPNEA: ICD-10-CM

## 2018-01-11 RX ORDER — TOPIRAMATE 50 MG/1
50 TABLET, FILM COATED ORAL 2 TIMES DAILY
Qty: 60 TAB | Refills: 5 | Status: SHIPPED | OUTPATIENT
Start: 2018-01-11 | End: 2018-01-11 | Stop reason: CLARIF

## 2018-01-11 RX ORDER — TOPIRAMATE 50 MG/1
50 TABLET, FILM COATED ORAL 2 TIMES DAILY
Qty: 60 TAB | Refills: 5 | Status: SHIPPED | OUTPATIENT
Start: 2018-01-11 | End: 2018-04-27 | Stop reason: SDUPTHER

## 2018-01-11 RX ORDER — CEFUROXIME AXETIL 500 MG/1
500 TABLET ORAL 2 TIMES DAILY
Qty: 20 TAB | Refills: 0 | Status: SHIPPED | OUTPATIENT
Start: 2018-01-11 | End: 2018-01-26

## 2018-01-11 NOTE — PROGRESS NOTES
Subjective:   Pawan Cruz is a 50 y.o. male      Chief Complaint   Patient presents with    Follow-up     1 month f/u         History of present illness:  Michael Posadas comes to the office today in follow up. He's lost further weight on Topamax, though there was some mixup on the medication and it was refilled as 25 rather than 50 mg twice daily. As a result he had decreased the dosing slightly for a while. We have corrected that and given him a prescription for 50 mg twice a day. In addition he's had difficulties recently with some cough and lower extremity edema, as well as sensation of shortness of breath when he lies down at night, such that he has to prop himself up to breathe more effectively. He does use CPAP and this has not helped with that sensation. It certainly sounds as though he's having some orthopnea, though he denies other symptoms of congestive heart failure. He denies chest pain. His EKG today is completely normal.  Chest xray reveals some questionable infiltrate in the right mid lung field, even though his history is not entirely consistent with pneumonia. Given the current symptoms that he has, I think we should make sure he's not had a fall in his ejection fraction and I have referred him for an echocardiogram.  In the meantime with the chest xray appearance I have suggested we start Ceftin 500 mg twice a day and see how he does. He's not wheezing today and I don't think inhalers would be helpful at this point. I think we'll simply see what the antibiotic does and also get the echocardiogram and have him back fairly quickly after that.         Patient Active Problem List    Diagnosis Date Noted    Essential hypertension 08/03/2017     Priority: 1 - One    Hyperlipidemia 08/02/2017     Priority: 1 - One    Cervical radiculopathy 11/07/2017     Priority: 2 - Two    TYRESE on CPAP 08/02/2017     Priority: 2 - Two    Knee pain, left 08/02/2017     Priority: 2 - Two    IGT (impaired glucose tolerance) 08/02/2017     Priority: 2 - Two    Depression 08/02/2017     Priority: 2 - Two    Fatty liver 08/02/2017     Priority: 2 - Two    Allergic rhinitis 08/02/2017     Priority: 3 - Three    Plantar fasciitis 08/02/2017     Priority: 3 - Three    BMI 40.0-44.9, adult ([facility] [address] 08/02/2017     Priority: 3 - Three    Asthma 08/02/2017     Priority: 3 - Three    Complex tear of medial meniscus of right knee as current injury 07/27/2017     Priority: 3 - Three    Osteoarthrosis, localized, primary, knee 07/27/2017     Priority: 3 - Three    Deviated nasal septum 08/02/2017     Priority: 4 - Four    Vitamin D deficiency 08/02/2017     Priority: 4 - Four    Metatarsalgia 08/02/2017     Priority: 4 - Four    Insomnia 08/02/2017     Priority: 4 - Four    Recurrent depression ([facility] [address] 01/11/2018    PE (physical exam), annual 12/10/2017      Past Surgical History:   Procedure Laterality Date    HX KNEE ARTHROSCOPY Left     med menisectomy    HX ORTHOPAEDIC  1999 2006    bilateral shoulder decompressions    HX REFRACTIVE SURGERY Bilateral 2006      Allergies   Allergen Reactions    Lisinopril Unknown (comments)      Family History   Problem Relation Age of Onset    High Cholesterol Mother     Cancer Father      lung    Other Father      lymphoma      Social History     Social History    Marital status: SINGLE     Spouse name: N/A    Number of children: N/A    Years of education: N/A     Occupational History    Not on file. Social History Main Topics    Smoking status: Never Smoker    Smokeless tobacco: Never Used    Alcohol use No    Drug use: Not on file    Sexual activity: Not on file     Other Topics Concern    Not on file     Social History Narrative     Outpatient Prescriptions Marked as Taking for the 1/11/18 encounter (Office Visit) with Nico Ventura MD   Medication Sig Dispense Refill    cefUROXime (CEFTIN) 500 mg tablet Take 1 Tab by mouth two (2) times a day.  20 Tab 0    topiramate (TOPAMAX) 50 mg tablet Take 1 Tab by mouth two (2) times a day. 60 Tab 5    meloxicam (MOBIC) 15 mg tablet TAKE 1 TABLET EVERY DAY 30 Tab 3    clonazePAM (KLONOPIN) 0.5 mg tablet Take 2 Tabs by mouth nightly as needed. Max Daily Amount: 1 mg. 180 Tab 3    rosuvastatin (CRESTOR) 5 mg tablet TAKE ONE TABLET BY MOUTH EVERY DAY 30 Tab 7    buPROPion XL (WELLBUTRIN XL) 300 mg XL tablet TAKE 1 TABLET BY MOUTH DAILY 90 Tab 1    albuterol (PROVENTIL HFA, VENTOLIN HFA, PROAIR HFA) 90 mcg/actuation inhaler Take 2 Puffs by inhalation every four (4) hours as needed for Wheezing.  cholecalciferol, vitamin D3, (VITAMIN D3) 2,000 unit tab Take 2,000 Units by mouth daily.  hydroCHLOROthiazide (HYDRODIURIL) 25 mg tablet Take 25 mg by mouth daily.  multivitamin (ONE A DAY) tablet Take 1 Tab by mouth daily.  sodium chloride-aloe vera (AYR SALINE) topical gel Apply  to affected area once.  aspirin delayed-release 81 mg tablet Take 81 mg by mouth daily.  OTHER Indications: ALLERGY SHOTS      losartan (COZAAR) 100 mg tablet Take 100 mg by mouth daily. Indications: HYPERTENSION      amLODIPine (NORVASC) 5 mg tablet Take 5 mg by mouth daily. Indications: HYPERTENSION          Review of Systems              Constitutional:  He denies fever, weight loss, sweats or fatigue. HEENT:  No blurred or double vision, headache or dizziness. No difficulty with swallowing, taste, speech or smell. Respiratory:   Cough and SOB. No wheezing or  sputum production. Cardiac:  Denies chest pain, palpitations, unexplained indigestion, syncope, edema, PND. Has orthopnea   GI:  No changes in bowel movements, no abdominal pain, no bloating, anorexia, nausea, vomiting or heartburn. :  No frequency or dysuria. Denies incontinence. Extremities:  No joint pain, stiffness or swelling. Skin:  No recent rashes or mole changes. Neurological:  No numbness, tingling, burning paresthesias or loss of motor strength. No syncope, dizziness, frequent headaches or memory loss. Objective:     Vitals:    01/11/18 1050   BP: 132/79   Pulse: 80   Resp: 18   Temp: 98.3 °F (36.8 °C)   TempSrc: Oral   SpO2: 95%   Weight: 288 lb 9.6 oz (130.9 kg)   Height: 5' 10\" (1.778 m)   PainSc:   0 - No pain       Body mass index is 41.41 kg/(m^2). Physical Examination:              General Appearance:  Well-developed, well-nourished, no acute  distress. HEENT:     Ears:  The TMs and ear canals were clear. Eyes:  The pupillary responses were normal.  Extraocular muscle function intact. Lids and conjunctiva not injected. Neck:  Supple without thyromegaly or adenopathy. No JVD noted. Lungs:  Clear to auscultation and percussion. Cardiac:  Regular rate and rhythm without murmur. GI: nontender w/o mass. Normal BS's. Extremities:  No clubbing, cyanosis or edema. Skin:  No rash or unusual mole changes noted. Neurological:  Grossly normal.            Assessment/Plan:   Impressions:      ICD-10-CM ICD-9-CM    1. SOB (shortness of breath) R06.02 786.05 AMB POC EKG ROUTINE W/ 12 LEADS, INTER & REP      XR CHEST PA LAT      2D ECHO COMPLETE ADULT (TTE) W OR WO CONTR   2. Orthopnea R06.01 786.02 AMB POC EKG ROUTINE W/ 12 LEADS, INTER & REP      XR CHEST PA LAT      2D ECHO COMPLETE ADULT (TTE) W OR WO CONTR   3. Recurrent depression (HCC) F33.9 296.30 DISCONTINUED: topiramate (TOPAMAX) 50 mg tablet   4. BMI 40.0-44.9, adult (HCC) Z68.41 V85.41 DISCONTINUED: topiramate (TOPAMAX) 50 mg tablet   5. Pneumonia of right middle lobe due to infectious organism ([facility] [address] J18.1 486         Plan:  1. Continue present meds  2. Lifestyle modifications including Na restriction, low carb/fat diet, weight reduction and exercise (at least a walking program). Follow-up Disposition:  Return in about 10 days (around 1/21/2018).       Orders Placed This Encounter    XR CHEST PA LAT     Standing Status:   Future     Number of Occurrences:   1 Standing Expiration Date:   2/11/2019     Order Specific Question:   Reason for Exam     Answer:   sob    AMB POC EKG ROUTINE W/ 12 LEADS, INTER & REP     Order Specific Question:   Reason for Exam:     Answer:   sob    2D ECHO COMPLETE ADULT (TTE) W OR WO CONTR     Standing Status:   Future     Standing Expiration Date:   7/11/2018     Order Specific Question:   Reason for Exam:     Answer:   sob and orthopnea     Order Specific Question:   Contrast Enhancement (Bubble Study, Definity, Optison) may be used if criteria listed in established evidence-based protocol has been identified. Answer: Yes    DISCONTD: topiramate (TOPAMAX) 50 mg tablet     Sig: Take 1 Tab by mouth two (2) times a day. Dispense:  60 Tab     Refill:  5    cefUROXime (CEFTIN) 500 mg tablet     Sig: Take 1 Tab by mouth two (2) times a day. Dispense:  20 Tab     Refill:  0    topiramate (TOPAMAX) 50 mg tablet     Sig: Take 1 Tab by mouth two (2) times a day.      Dispense:  60 Tab     Refill:  5       Joselin Ledesma MD

## 2018-01-11 NOTE — PROGRESS NOTES
1. Have you been to the ER, urgent care clinic since your last visit? Hospitalized since your last visit? No    2. Have you seen or consulted any other health care providers outside of the [address] [place] [address] since your last visit? Include any pap smears or colon screening.  No   Chief Complaint   Patient presents with    Follow-up     1 month f/u    fasting

## 2018-01-16 DIAGNOSIS — M17.10 OSTEOARTHRITIS, LOCALIZED, KNEE: Primary | ICD-10-CM

## 2018-01-16 RX ORDER — MELOXICAM 15 MG/1
TABLET ORAL
Qty: 90 TAB | Refills: 3 | Status: SHIPPED | OUTPATIENT
Start: 2018-01-16 | End: 2018-09-12 | Stop reason: SDUPTHER

## 2018-01-16 NOTE — TELEPHONE ENCOUNTER
Requested Prescriptions     Pending Prescriptions Disp Refills    meloxicam (MOBIC) 15 mg tablet 90 Tab 3     Sig: TAKE 1 TABLET EVERY DAY

## 2018-01-25 ENCOUNTER — HOSPITAL ENCOUNTER (OUTPATIENT)
Dept: NON INVASIVE DIAGNOSTICS | Age: 48
Discharge: HOME | End: 2018-01-25
Attending: INTERNAL MEDICINE
Payer: COMMERCIAL

## 2018-01-25 DIAGNOSIS — R06.02 SOB (SHORTNESS OF BREATH): ICD-10-CM

## 2018-01-25 DIAGNOSIS — R06.01 ORTHOPNEA: ICD-10-CM

## 2018-01-25 PROCEDURE — 93306 TTE W/DOPPLER COMPLETE: CPT

## 2018-01-26 ENCOUNTER — OFFICE VISIT (OUTPATIENT)
Dept: INTERNAL MEDICINE CLINIC | Age: 48
End: 2018-01-26

## 2018-01-26 VITALS
BODY MASS INDEX: 41.09 KG/M2 | WEIGHT: 287 LBS | SYSTOLIC BLOOD PRESSURE: 122 MMHG | HEART RATE: 86 BPM | TEMPERATURE: 98.1 F | OXYGEN SATURATION: 98 % | HEIGHT: 70 IN | DIASTOLIC BLOOD PRESSURE: 90 MMHG

## 2018-01-26 DIAGNOSIS — R73.02 IGT (IMPAIRED GLUCOSE TOLERANCE): ICD-10-CM

## 2018-01-26 DIAGNOSIS — I42.9 CARDIOMYOPATHY, UNSPECIFIED TYPE (HCC): ICD-10-CM

## 2018-01-26 DIAGNOSIS — R06.09 DOE (DYSPNEA ON EXERTION): Primary | ICD-10-CM

## 2018-01-26 DIAGNOSIS — I10 ESSENTIAL HYPERTENSION: ICD-10-CM

## 2018-01-26 RX ORDER — BUMETANIDE 0.5 MG/1
0.5 TABLET ORAL DAILY
Qty: 30 TAB | Refills: 0 | Status: SHIPPED | OUTPATIENT
Start: 2018-01-26 | End: 2018-02-22 | Stop reason: SDUPTHER

## 2018-01-26 NOTE — PROGRESS NOTES
Reviewed record in preparation for visit and have obtained necessary documentation. Identified pt with two pt identifiers(name and ). Chief Complaint   Patient presents with    Hypertension     f/u        Coordination of Care Questionnaire:  :     1) Have you been to an emergency room, urgent care clinic since your last visit? No     Hospitalized since your last visit? No               2) Have you seen or consulted any other health care providers outside of [address] [place] [address] since your last visit?  No

## 2018-01-26 NOTE — PROGRESS NOTES
Subjective:   Alirio Hubbard is a 50 y.o. male      Chief Complaint   Patient presents with    Hypertension     f/u        History of present illness: Mr. Annalise Schuler returns in follow up. He just finally got his echocardiogram we had scheduled for him last visit and it did show a slight reduction in his ejection fraction in the 50-55% range. His exercise tolerance is still poorer than it has been in the past.  His cough seems to have improved, particularly the nighttime cough, though he still has some cough during the day at times. I felt he might have a small area of pneumonia his last visit here, but the radiologist did not confirm this. I'm not sure now that more of his issue is not related to the mild cardiomyopathy that he has. We are going to change his HCTZ to Bumex. I also wanted to get a stress test to evaluate his function there. Will see him back in a month's time and call him about the results of these tests in the meantime. We've given him a rx for Bumex in place of HCTZ. The stress test will also help determine his baseline exercise tolerance.         Patient Active Problem List    Diagnosis Date Noted    Essential hypertension 08/03/2017     Priority: 1 - One    Hyperlipidemia 08/02/2017     Priority: 1 - One    Cervical radiculopathy 11/07/2017     Priority: 2 - Two    TYRESE on CPAP 08/02/2017     Priority: 2 - Two    Knee pain, left 08/02/2017     Priority: 2 - Two    IGT (impaired glucose tolerance) 08/02/2017     Priority: 2 - Two    Depression 08/02/2017     Priority: 2 - Two    Fatty liver 08/02/2017     Priority: 2 - Two    Allergic rhinitis 08/02/2017     Priority: 3 - Three    Plantar fasciitis 08/02/2017     Priority: 3 - Three    BMI 40.0-44.9, adult ([facility] [address] 08/02/2017     Priority: 3 - Three    Asthma 08/02/2017     Priority: 3 - Three    Complex tear of medial meniscus of right knee as current injury 07/27/2017     Priority: 3 - Three    Osteoarthrosis, localized, primary, knee 07/27/2017     Priority: 3 - Three    Deviated nasal septum 08/02/2017     Priority: 4 - Four    Vitamin D deficiency 08/02/2017     Priority: 4 - Four    Metatarsalgia 08/02/2017     Priority: 4 - Four    Insomnia 08/02/2017     Priority: 4 - Four    Cardiomyopathy ([facility] 01/26/2018    Recurrent depression ([facility] [facility] 01/11/2018    PE (physical exam), annual 12/10/2017      Past Surgical History:   Procedure Laterality Date    HX KNEE ARTHROSCOPY Left     med menisectomy    HX ORTHOPAEDIC  1999 2006    bilateral shoulder decompressions    HX REFRACTIVE SURGERY Bilateral 2006      Allergies   Allergen Reactions    Lisinopril Unknown (comments)      Family History   Problem Relation Age of Onset    High Cholesterol Mother     Cancer Father      lung    Other Father      lymphoma      Social History     Social History    Marital status: SINGLE     Spouse name: N/A    Number of children: N/A    Years of education: N/A     Occupational History    Not on file. Social History Main Topics    Smoking status: Never Smoker    Smokeless tobacco: Never Used    Alcohol use No    Drug use: Not on file    Sexual activity: Not on file     Other Topics Concern    Not on file     Social History Narrative     Outpatient Prescriptions Marked as Taking for the 1/26/18 encounter (Office Visit) with Laurie Almanzar MD   Medication Sig Dispense Refill    bumetanide (BUMEX) 0.5 mg tablet Take 1 Tab by mouth daily. 30 Tab 0    meloxicam (MOBIC) 15 mg tablet TAKE 1 TABLET EVERY DAY 90 Tab 3    topiramate (TOPAMAX) 50 mg tablet Take 1 Tab by mouth two (2) times a day. 60 Tab 5    clonazePAM (KLONOPIN) 0.5 mg tablet Take 2 Tabs by mouth nightly as needed. Max Daily Amount: 1 mg.  180 Tab 3    rosuvastatin (CRESTOR) 5 mg tablet TAKE ONE TABLET BY MOUTH EVERY DAY 30 Tab 7    buPROPion XL (WELLBUTRIN XL) 300 mg XL tablet TAKE 1 TABLET BY MOUTH DAILY 90 Tab 1    albuterol (PROVENTIL HFA, VENTOLIN HFA, PROAIR HFA) 90 mcg/actuation inhaler Take 2 Puffs by inhalation every four (4) hours as needed for Wheezing.  cholecalciferol, vitamin D3, (VITAMIN D3) 2,000 unit tab Take 2,000 Units by mouth daily.  multivitamin (ONE A DAY) tablet Take 1 Tab by mouth daily.  sodium chloride-aloe vera (AYR SALINE) topical gel Apply  to affected area once.  aspirin delayed-release 81 mg tablet Take 81 mg by mouth daily.  OTHER Indications: ALLERGY SHOTS      losartan (COZAAR) 100 mg tablet Take 100 mg by mouth daily. Indications: HYPERTENSION      amLODIPine (NORVASC) 5 mg tablet Take 5 mg by mouth daily. Indications: HYPERTENSION          Review of Systems              Constitutional:  He denies fever, weight loss, sweats or fatigue. HEENT:  No blurred or double vision, headache or dizziness. No difficulty with swallowing, taste, speech or smell. Respiratory:  No cough, wheezing or shortness of breath. No sputum production. Cardiac:  Denies chest pain, palpitations, unexplained indigestion, syncope, edema, PND or orthopnea. GI:  No changes in bowel movements, no abdominal pain, no bloating, anorexia, nausea, vomiting or heartburn. :  No frequency or dysuria. Denies incontinence. Extremities:  No joint pain, stiffness or swelling. Skin:  No recent rashes or mole changes. Neurological:  No numbness, tingling, burning paresthesias or loss of motor strength. No syncope, dizziness, frequent headaches or memory loss. Objective:     Vitals:    01/26/18 0913   BP: 122/90   Pulse: 86   Temp: 98.1 °F (36.7 °C)   TempSrc: Oral   SpO2: 98%   Weight: 287 lb (130.2 kg)   Height: 5' 10\" (1.778 m)   PainSc:   0 - No pain       Body mass index is 41.18 kg/(m^2). Physical Examination:              General Appearance:  Well-developed, well-nourished, no acute  distress. HEENT:     Ears:  The TMs and ear canals were clear.    Eyes:  The pupillary responses were normal.  Extraocular muscle function intact. Lids and conjunctiva not injected. Neck:  Supple without thyromegaly or adenopathy. No JVD noted. Lungs:  Clear to auscultation and percussion. Cardiac:  Regular rate and rhythm without murmur. GI: nontender w/o mass. Normal BS's. Extremities:  No clubbing, cyanosis or edema. Skin:  No rash or unusual mole changes noted. Neurological:  Grossly normal.            Assessment/Plan:   Impressions:      ICD-10-CM ICD-9-CM    1. DAN (dyspnea on exertion) R06.09 786.09 NM CARDIAC SPECT W STRS/REST MULT   2. Essential hypertension I10 401.9    3. IGT (impaired glucose tolerance) R73.02 790.22    4. Cardiomyopathy, unspecified type ([facility] I42.9 425.4         Plan:  1. Continue present meds  2. Lifestyle modifications including Na restriction, low carb/fat diet, weight reduction and exercise (at least a walking program). 3. Continue Topamax for weight loss        Follow-up Disposition:  Return in about 4 weeks (around 2/23/2018). Orders Placed This Encounter    NM CARDIAC SPECT W STRS/REST MULT     Standing Status:   Future     Standing Expiration Date:   2/26/2019    bumetanide (BUMEX) 0.5 mg tablet     Sig: Take 1 Tab by mouth daily.      Dispense:  30 Tab     Refill:  0       Omaira Collins MD

## 2018-02-06 ENCOUNTER — HOSPITAL ENCOUNTER (OUTPATIENT)
Dept: NON INVASIVE DIAGNOSTICS | Age: 48
Discharge: HOME | End: 2018-02-06
Attending: INTERNAL MEDICINE
Payer: COMMERCIAL

## 2018-02-06 ENCOUNTER — HOSPITAL ENCOUNTER (OUTPATIENT)
Dept: NUCLEAR MEDICINE | Age: 48
Discharge: HOME | End: 2018-02-06
Attending: INTERNAL MEDICINE
Payer: COMMERCIAL

## 2018-02-06 DIAGNOSIS — R06.09 DYSPNEA ON EXERTION: ICD-10-CM

## 2018-02-06 DIAGNOSIS — R06.09 DOE (DYSPNEA ON EXERTION): ICD-10-CM

## 2018-02-06 LAB
ATTENDING PHYSICIAN, CST07: NORMAL
DIAGNOSIS, 93000: NORMAL
DUKE TM SCORE RESULT, CST14: NORMAL
DUKE TREADMILL SCORE, CST13: 5456
ECG INTERP BEFORE EX, CST11: NORMAL
ECG INTERP DURING EX, CST12: NORMAL
FUNCTIONAL CAPACITY, CST17: NORMAL
KNOWN CARDIAC CONDITION, CST08: NORMAL
MAX. DIASTOLIC BP, CST04: 80 MMHG
MAX. HEART RATE, CST05: 155 BPM
MAX. SYSTOLIC BP, CST03: 158 MMHG
OVERALL BP RESPONSE TO EXERCISE, CST16: NORMAL
OVERALL HR RESPONSE TO EXERCISE, CST15: NORMAL
PEAK EX METS, CST10: 9.3 METS
PROTOCOL NAME, CST01: NORMAL
TEST INDICATION, CST09: NORMAL

## 2018-02-06 PROCEDURE — 93017 CV STRESS TEST TRACING ONLY: CPT

## 2018-02-06 PROCEDURE — A9500 TC99M SESTAMIBI: HCPCS

## 2018-02-06 NOTE — PROGRESS NOTES
Patient informed that Stress test negative. Heart function slightly low by this test as well so continue Bumex (fluid pill). Follow up as scheduled.

## 2018-02-06 NOTE — PROGRESS NOTES
Stress test negative. Heart function slightly low by this test as well so continue Bumex (fluid pill). Follow up as scheduled.

## 2018-02-22 RX ORDER — BUPROPION HYDROCHLORIDE 300 MG/1
TABLET ORAL
Qty: 90 TAB | Refills: 1 | Status: SHIPPED | OUTPATIENT
Start: 2018-02-22 | End: 2018-08-18 | Stop reason: SDUPTHER

## 2018-02-22 RX ORDER — BUMETANIDE 0.5 MG/1
TABLET ORAL
Qty: 30 TAB | Refills: 0 | Status: SHIPPED | OUTPATIENT
Start: 2018-02-22 | End: 2018-03-25 | Stop reason: SDUPTHER

## 2018-02-23 ENCOUNTER — OFFICE VISIT (OUTPATIENT)
Dept: INTERNAL MEDICINE CLINIC | Age: 48
End: 2018-02-23

## 2018-02-23 VITALS
SYSTOLIC BLOOD PRESSURE: 118 MMHG | HEART RATE: 80 BPM | TEMPERATURE: 98.1 F | OXYGEN SATURATION: 97 % | BODY MASS INDEX: 40.57 KG/M2 | HEIGHT: 70 IN | WEIGHT: 283.4 LBS | DIASTOLIC BLOOD PRESSURE: 86 MMHG

## 2018-02-23 DIAGNOSIS — R73.02 IGT (IMPAIRED GLUCOSE TOLERANCE): Primary | ICD-10-CM

## 2018-02-23 DIAGNOSIS — I42.9 CARDIOMYOPATHY, UNSPECIFIED TYPE (HCC): ICD-10-CM

## 2018-02-23 DIAGNOSIS — I10 ESSENTIAL HYPERTENSION: ICD-10-CM

## 2018-02-23 LAB
BUN BLD-MCNC: 14 MG/DL (ref 9–20)
CALCIUM BLD-MCNC: 9.9 MG/DL (ref 8.4–10.2)
CHLORIDE BLD-SCNC: 107 MMOL/L (ref 98–107)
CO2 POC: 24 MMOL/L (ref 22–32)
CREAT BLD-MCNC: 1.2 MG/DL (ref 0.8–1.5)
EGFR (POC): 70.6
GLUCOSE POC: 105 MG/DL (ref 75–110)
HBA1C MFR BLD HPLC: 5.2 % (ref 4.5–5.7)
POTASSIUM SERPL-SCNC: 4.7 MMOL/L (ref 3.6–5)
SODIUM SERPL-SCNC: 143 MMOL/L (ref 137–145)

## 2018-02-23 NOTE — PROGRESS NOTES
Subjective:   Cuauhtemoc Ocasio is a 50 y.o. male      Chief Complaint   Patient presents with    Hypertension    Cardiomyopathy        History of present illness:  Harpal Judge returns in follow up. He still experiences some shortness of breath with activity, but he's not been exercising regularly. I told him he needs to get back to that. Stress test was negative except for mildly diminished ejection fraction and I think everything should improve with continued weight reduction and exercise program as some of his symptoms are clearly related to deconditioning. He doesn't have that significant of a cardiomyopathy that it should be contributing that much to his shortness of breath at this point. Will obviously have to keep a close eye on his ejection fraction, however. I think he is on the appropriate medication, including an ARB, as well as diuretic. He's continued to lose weight with another 4-5 lb weight loss since his last visit here and overall he has lost 25 lbs. Hopefully he will continue in that direction as well. We will check him again in three months time fasting with labs regarding his lipids, etc.  Will see how he's doing in his exercise program, etc., at that point.  Certainly at the year's interval we will probably want to repeat his echocardiogram.        Patient Active Problem List    Diagnosis Date Noted    Essential hypertension 08/03/2017     Priority: 1 - One    Hyperlipidemia 08/02/2017     Priority: 1 - One    Cervical radiculopathy 11/07/2017     Priority: 2 - Two    TYRESE on CPAP 08/02/2017     Priority: 2 - Two    Knee pain, left 08/02/2017     Priority: 2 - Two    IGT (impaired glucose tolerance) 08/02/2017     Priority: 2 - Two    Depression 08/02/2017     Priority: 2 - Two    Fatty liver 08/02/2017     Priority: 2 - Two    Allergic rhinitis 08/02/2017     Priority: 3 - Three    Plantar fasciitis 08/02/2017     Priority: 3 - Three    BMI 40.0-44.9, adult ([facility] [address] 08/02/2017     Priority: 3 - Three    Asthma 08/02/2017     Priority: 3 - Three    Complex tear of medial meniscus of right knee as current injury 07/27/2017     Priority: 3 - Three    Osteoarthrosis, localized, primary, knee 07/27/2017     Priority: 3 - Three    Deviated nasal septum 08/02/2017     Priority: 4 - Four    Vitamin D deficiency 08/02/2017     Priority: 4 - Four    Metatarsalgia 08/02/2017     Priority: 4 - Four    Insomnia 08/02/2017     Priority: 4 - Four    Cardiomyopathy ([facility] [address] 01/26/2018    Recurrent depression ([facility] [address] [facility] 01/11/2018    PE (physical exam), annual 12/10/2017      Past Surgical History:   Procedure Laterality Date    HX KNEE ARTHROSCOPY Left     med menisectomy    HX ORTHOPAEDIC  1999 2006    bilateral shoulder decompressions    HX REFRACTIVE SURGERY Bilateral 2006      Allergies   Allergen Reactions    Lisinopril Unknown (comments)      Family History   Problem Relation Age of Onset    High Cholesterol Mother     Cancer Father      lung    Other Father      lymphoma      Social History     Social History    Marital status: SINGLE     Spouse name: N/A    Number of children: N/A    Years of education: N/A     Occupational History    Not on file. Social History Main Topics    Smoking status: Never Smoker    Smokeless tobacco: Never Used    Alcohol use No    Drug use: Not on file    Sexual activity: Not on file     Other Topics Concern    Not on file     Social History Narrative     Outpatient Prescriptions Marked as Taking for the 2/23/18 encounter (Office Visit) with Olga Rashid MD   Medication Sig Dispense Refill    bumetanide (BUMEX) 0.5 mg tablet TAKE 1 TAB BY MOUTH DAILY. 30 Tab 0    buPROPion XL (WELLBUTRIN XL) 300 mg XL tablet TAKE 1 TABLET BY MOUTH DAILY 90 Tab 1    meloxicam (MOBIC) 15 mg tablet TAKE 1 TABLET EVERY DAY 90 Tab 3    topiramate (TOPAMAX) 50 mg tablet Take 1 Tab by mouth two (2) times a day.  60 Tab 5    clonazePAM (KLONOPIN) 0.5 mg tablet Take 2 Tabs by mouth nightly as needed. Max Daily Amount: 1 mg. 180 Tab 3    rosuvastatin (CRESTOR) 5 mg tablet TAKE ONE TABLET BY MOUTH EVERY DAY 30 Tab 7    albuterol (PROVENTIL HFA, VENTOLIN HFA, PROAIR HFA) 90 mcg/actuation inhaler Take 2 Puffs by inhalation every four (4) hours as needed for Wheezing.  cholecalciferol, vitamin D3, (VITAMIN D3) 2,000 unit tab Take 2,000 Units by mouth daily.  multivitamin (ONE A DAY) tablet Take 1 Tab by mouth daily.  sodium chloride-aloe vera (AYR SALINE) topical gel Apply  to affected area once.  aspirin delayed-release 81 mg tablet Take 81 mg by mouth daily.  OTHER Indications: ALLERGY SHOTS      losartan (COZAAR) 100 mg tablet Take 100 mg by mouth daily. Indications: HYPERTENSION      amLODIPine (NORVASC) 5 mg tablet Take 5 mg by mouth daily. Indications: HYPERTENSION          Review of Systems              Constitutional:  He denies fever, weight loss, sweats or fatigue. HEENT:  No blurred or double vision, headache or dizziness. No difficulty with swallowing, taste, speech or smell. Respiratory:  No cough, wheezing or shortness of breath. No sputum production. Cardiac:  Denies chest pain, palpitations, unexplained indigestion, syncope, edema, PND or orthopnea. GI:  No changes in bowel movements, no abdominal pain, no bloating, anorexia, nausea, vomiting or heartburn. :  No frequency or dysuria. Denies incontinence. Extremities:  No joint pain, stiffness or swelling. Skin:  No recent rashes or mole changes. Neurological:  No numbness, tingling, burning paresthesias or loss of motor strength. No syncope, dizziness, frequent headaches or memory loss. Objective:     Vitals:    02/23/18 0833   BP: 118/86   Pulse: 80   Temp: 98.1 °F (36.7 °C)   TempSrc: Oral   SpO2: 97%   Weight: 283 lb 6.4 oz (128.5 kg)   Height: 5' 10\" (1.778 m)   PainSc:   0 - No pain       Body mass index is 40.66 kg/(m^2).    Physical Examination:              General Appearance:  Well-developed, well-nourished, no acute  distress. HEENT:     Ears:  The TMs and ear canals were clear. Eyes:  The pupillary responses were normal.  Extraocular muscle function intact. Lids and conjunctiva not injected. Neck:  Supple without thyromegaly or adenopathy. No JVD noted. Lungs:  Clear to auscultation and percussion. Cardiac:  Regular rate and rhythm without murmur. GI: nontender w/o mass. Normal BS's. Extremities:  No clubbing, cyanosis or edema. Skin:  No rash or unusual mole changes noted. Neurological:  Grossly normal.            Assessment/Plan:   Impressions:      ICD-10-CM ICD-9-CM    1. IGT (impaired glucose tolerance) R73.02 790.22 AMB POC BASIC METABOLIC PANEL      AMB POC HEMOGLOBIN A1C   2. Essential hypertension I10 401.9 AMB POC BASIC METABOLIC PANEL      AMB POC HEMOGLOBIN A1C   3. Cardiomyopathy, unspecified type (HCC) I42.9 425.4 AMB POC BASIC METABOLIC PANEL      AMB POC HEMOGLOBIN A1C        Plan:  1. Continue present meds  2. Lifestyle modifications including Na restriction, low carb/fat diet, weight reduction and exercise (at least a walking program). Follow-up Disposition:  Return in about 3 months (around 5/23/2018) for Fasting, lipids, Diabetes, BP check.       Orders Placed This Encounter    AMB POC BASIC METABOLIC PANEL    AMB POC HEMOGLOBIN A1C       Harrison Cohen MD

## 2018-02-23 NOTE — PROGRESS NOTES
Reviewed record in preparation for visit and have obtained necessary documentation. Identified pt with two pt identifiers(name and ). No chief complaint on file. Coordination of Care Questionnaire:  :     1) Have you been to an emergency room, urgent care clinic since your last visit? No     Hospitalized since your last visit? No             2) Have you seen or consulted any other health care providers outside of [address] [place] [address] since your last visit?  No

## 2018-02-27 NOTE — PROGRESS NOTES
Patient informed that A1C now nondiabetic 5.2.  Blood sugar, kidney and K+ normal.  No change in Rx.

## 2018-03-25 RX ORDER — BUMETANIDE 0.5 MG/1
TABLET ORAL
Qty: 30 TAB | Refills: 0 | Status: SHIPPED | OUTPATIENT
Start: 2018-03-25 | End: 2018-04-24 | Stop reason: SDUPTHER

## 2018-04-08 RX ORDER — LOSARTAN POTASSIUM 100 MG/1
TABLET ORAL
Qty: 90 TAB | Refills: 4 | Status: SHIPPED | OUTPATIENT
Start: 2018-04-08 | End: 2018-07-13 | Stop reason: SDUPTHER

## 2018-04-08 RX ORDER — AMLODIPINE BESYLATE 5 MG/1
TABLET ORAL
Qty: 90 TAB | Refills: 4 | Status: SHIPPED | OUTPATIENT
Start: 2018-04-08 | End: 2018-07-13 | Stop reason: SDUPTHER

## 2018-04-25 RX ORDER — BUMETANIDE 0.5 MG/1
TABLET ORAL
Qty: 30 TAB | Refills: 0 | Status: SHIPPED | OUTPATIENT
Start: 2018-04-25 | End: 2018-05-19 | Stop reason: SDUPTHER

## 2018-04-27 DIAGNOSIS — E78.00 HYPERCHOLESTEREMIA: ICD-10-CM

## 2018-04-27 DIAGNOSIS — M77.40 METATARSALGIA, UNSPECIFIED LATERALITY: Primary | ICD-10-CM

## 2018-04-27 RX ORDER — ROSUVASTATIN CALCIUM 5 MG/1
5 TABLET, COATED ORAL
Qty: 90 TAB | Refills: 3 | Status: SHIPPED | OUTPATIENT
Start: 2018-04-27 | End: 2019-02-08

## 2018-04-27 RX ORDER — TOPIRAMATE 50 MG/1
50 TABLET, FILM COATED ORAL 2 TIMES DAILY
Qty: 60 TAB | Refills: 5 | Status: SHIPPED | OUTPATIENT
Start: 2018-04-27 | End: 2018-04-30 | Stop reason: SDUPTHER

## 2018-04-27 NOTE — TELEPHONE ENCOUNTER
Requested Prescriptions     Pending Prescriptions Disp Refills    topiramate (TOPAMAX) 50 mg tablet 60 Tab 5     Sig: Take 1 Tab by mouth two (2) times a day. Requested Prescriptions     Pending Prescriptions Disp Refills    topiramate (TOPAMAX) 50 mg tablet 60 Tab 5     Sig: Take 1 Tab by mouth two (2) times a day.  rosuvastatin (CRESTOR) 5 mg tablet 90 Tab 3     Sig: Take 1 Tab by mouth nightly.

## 2018-04-30 DIAGNOSIS — M77.40 METATARSALGIA, UNSPECIFIED LATERALITY: ICD-10-CM

## 2018-04-30 RX ORDER — TOPIRAMATE 50 MG/1
50 TABLET, FILM COATED ORAL 2 TIMES DAILY
Qty: 180 TAB | Refills: 3 | Status: SHIPPED | OUTPATIENT
Start: 2018-04-30 | End: 2018-05-31 | Stop reason: SDUPTHER

## 2018-04-30 NOTE — TELEPHONE ENCOUNTER
Requested Prescriptions     Pending Prescriptions Disp Refills    topiramate (TOPAMAX) 50 mg tablet 180 Tab 3     Sig: Take 1 Tab by mouth two (2) times a day.

## 2018-05-16 PROBLEM — Z79.899 LONG-TERM CURRENT USE OF HIGH RISK MEDICATION OTHER THAN ANTICOAGULANT: Status: ACTIVE | Noted: 2018-05-16

## 2018-05-18 ENCOUNTER — OFFICE VISIT (OUTPATIENT)
Dept: INTERNAL MEDICINE CLINIC | Age: 48
End: 2018-05-18

## 2018-05-18 VITALS
OXYGEN SATURATION: 95 % | HEIGHT: 70 IN | SYSTOLIC BLOOD PRESSURE: 119 MMHG | TEMPERATURE: 98.1 F | RESPIRATION RATE: 18 BRPM | DIASTOLIC BLOOD PRESSURE: 80 MMHG | BODY MASS INDEX: 40.4 KG/M2 | HEART RATE: 83 BPM | WEIGHT: 282.2 LBS

## 2018-05-18 DIAGNOSIS — E78.5 HYPERLIPIDEMIA, UNSPECIFIED HYPERLIPIDEMIA TYPE: ICD-10-CM

## 2018-05-18 DIAGNOSIS — I42.9 CARDIOMYOPATHY, UNSPECIFIED TYPE (HCC): Primary | ICD-10-CM

## 2018-05-18 DIAGNOSIS — Z79.899 LONG-TERM CURRENT USE OF HIGH RISK MEDICATION OTHER THAN ANTICOAGULANT: ICD-10-CM

## 2018-05-18 DIAGNOSIS — R73.02 IGT (IMPAIRED GLUCOSE TOLERANCE): ICD-10-CM

## 2018-05-18 DIAGNOSIS — I10 ESSENTIAL HYPERTENSION: ICD-10-CM

## 2018-05-18 LAB
ALBUMIN SERPL-MCNC: 4.4 G/DL (ref 3.9–5.4)
ALKALINE PHOS POC: 109 U/L (ref 38–126)
ALT SERPL-CCNC: 48 U/L (ref 9–52)
AST SERPL-CCNC: 27 U/L (ref 14–36)
BUN BLD-MCNC: 12 MG/DL (ref 9–20)
CALCIUM BLD-MCNC: 9.6 MG/DL (ref 8.4–10.2)
CHLORIDE BLD-SCNC: 107 MMOL/L (ref 98–107)
CHOLEST SERPL-MCNC: 86 MG/DL (ref 0–200)
CK (CPK) POC: 73 U/L (ref 30–135)
CO2 POC: 22 MMOL/L (ref 22–32)
CREAT BLD-MCNC: 1.3 MG/DL (ref 0.8–1.5)
EGFR (POC): 63.6
GLUCOSE POC: 110 MG/DL (ref 75–110)
HBA1C MFR BLD HPLC: 5.5 % (ref 4.5–5.7)
HDLC SERPL-MCNC: 32 MG/DL (ref 35–130)
LDL CHOLESTEROL POC: 33.2 MG/DL (ref 0–130)
POTASSIUM SERPL-SCNC: 4.4 MMOL/L (ref 3.6–5)
PROT SERPL-MCNC: 7.4 G/DL (ref 6.3–8.2)
SODIUM SERPL-SCNC: 143 MMOL/L (ref 137–145)
TCHOL/HDL RATIO (POC): 2.7 (ref 0–4)
TOTAL BILIRUBIN POC: 0.8 MG/DL (ref 0.2–1.3)
TRIGL SERPL-MCNC: 104 MG/DL (ref 0–200)
VLDLC SERPL CALC-MCNC: 20.8 MG/DL

## 2018-05-18 RX ORDER — HYDROCHLOROTHIAZIDE 25 MG/1
25 TABLET ORAL
COMMUNITY
End: 2018-05-18

## 2018-05-18 NOTE — PROGRESS NOTES
Subjective:   Fermin De La Torre is a 46 y.o. male      Chief Complaint   Patient presents with    Diabetes     follow up        History of present illness:  Db Cuevas returns in follow-up. He has lost some weight but not as much as we would have liked. He says that his appetite is reasonable and he is restricting his calories. He is really not very active, however. A lot of this is related to time constraints of his job but also to the knee pain that he has. He will be undergoing arthroscopic surgery in approximately three weeks by Dr. Marielle Lui, however. Hopefully after that he can begin a better exercise program.  This will also help his shortness of breath which is due to some degree of deconditioning and to very mild degree a mild cardiomyopathy. He has been compliant with his Bumex and actually his shortness of breath is better but it is not back to what he feels it should be or could be. Exercise should help that as well as facilitate weight loss. He already restricts salt. He restricts his carbohydrates. He denies other new cardiorespiratory, GI or  symptoms. He is still having the issues with his knee pain, however. Plan: We will check his labs. I do not see anything to adjust in terms of his medications. His blood pressure is excellent today. We will see him again in three months' time. Hopefully by that time he will be more involved in an exercise program and we will see more results of weight loss.             Patient Active Problem List    Diagnosis Date Noted    Essential hypertension 08/03/2017     Priority: 1 - One    Hyperlipidemia 08/02/2017     Priority: 1 - One    Cervical radiculopathy 11/07/2017     Priority: 2 - Two    TYRESE on CPAP 08/02/2017     Priority: 2 - Two    Knee pain, left 08/02/2017     Priority: 2 - Two    IGT (impaired glucose tolerance) 08/02/2017     Priority: 2 - Two    Depression 08/02/2017     Priority: 2 - Two    Fatty liver 08/02/2017     Priority: 2 - Two    Allergic rhinitis 08/02/2017     Priority: 3 - Three    Plantar fasciitis 08/02/2017     Priority: 3 - Three    BMI 40.0-44.9, adult ([facility] [address] 08/02/2017     Priority: 3 - Three    Asthma 08/02/2017     Priority: 3 - Three    Complex tear of medial meniscus of right knee as current injury 07/27/2017     Priority: 3 - Three    Osteoarthrosis, localized, primary, knee 07/27/2017     Priority: 3 - Three    Long-term current use of high risk medication other than anticoagulant 05/16/2018     Priority: 4 - Four    Deviated nasal septum 08/02/2017     Priority: 4 - Four    Vitamin D deficiency 08/02/2017     Priority: 4 - Four    Metatarsalgia 08/02/2017     Priority: 4 - Four    Insomnia 08/02/2017     Priority: 4 - Four    Cardiomyopathy ([facility] [address] 01/26/2018    Recurrent [facility] ([facility] 01/11/2018    PE (physical exam), annual 12/10/2017      Past Surgical History:   Procedure Laterality Date    HX KNEE ARTHROSCOPY Left     med menisectomy    HX ORTHOPAEDIC  1999 2006    bilateral shoulder decompressions    HX REFRACTIVE SURGERY Bilateral 2006      Allergies   Allergen Reactions    Lisinopril Unknown (comments)      Family History   Problem Relation Age of Onset    High Cholesterol Mother     Cancer Father      lung    Other Father      lymphoma      Social History     Social History    Marital status: SINGLE     Spouse name: N/A    Number of children: N/A    Years of education: N/A     Occupational History    Not on file. Social History Main Topics    Smoking status: Never Smoker    Smokeless tobacco: Never Used    Alcohol use No    Drug use: Not on file    Sexual activity: Not on file     Other Topics Concern    Not on file     Social History Narrative     Outpatient Prescriptions Marked as Taking for the 5/18/18 encounter (Office Visit) with Nora Figueredo MD   Medication Sig Dispense Refill    topiramate (TOPAMAX) 50 mg tablet Take 1 Tab by mouth two (2) times a day.  180 Tab 3  rosuvastatin (CRESTOR) 5 mg tablet Take 1 Tab by mouth nightly. 90 Tab 3    bumetanide (BUMEX) 0.5 mg tablet TAKE 1 TAB BY MOUTH DAILY. 30 Tab 0    amLODIPine (NORVASC) 5 mg tablet TAKE 1 TABLET BY MOUTH EVERY DAY 90 Tab 4    losartan (COZAAR) 100 mg tablet TAKE 1 TABLET BY MOUTH EVERY DAY 90 Tab 4    buPROPion XL (WELLBUTRIN XL) 300 mg XL tablet TAKE 1 TABLET BY MOUTH DAILY 90 Tab 1    meloxicam (MOBIC) 15 mg tablet TAKE 1 TABLET EVERY DAY 90 Tab 3    clonazePAM (KLONOPIN) 0.5 mg tablet Take 2 Tabs by mouth nightly as needed. Max Daily Amount: 1 mg. 180 Tab 3    albuterol (PROVENTIL HFA, VENTOLIN HFA, PROAIR HFA) 90 mcg/actuation inhaler Take 2 Puffs by inhalation every four (4) hours as needed for Wheezing.  cholecalciferol, vitamin D3, (VITAMIN D3) 2,000 unit tab Take 2,000 Units by mouth daily.  multivitamin (ONE A DAY) tablet Take 1 Tab by mouth daily.  sodium chloride-aloe vera (AYR SALINE) topical gel Apply  to affected area once.  aspirin delayed-release 81 mg tablet Take 81 mg by mouth daily.  OTHER Indications: ALLERGY SHOTS          Review of Systems              Constitutional:  He denies fever, weight loss, sweats or fatigue. HEENT:  No blurred or double vision, headache or dizziness. No difficulty with swallowing, taste, speech or smell. Respiratory:  No cough, wheezing or shortness of breath. No sputum production. Cardiac:  Denies chest pain, palpitations, unexplained indigestion, syncope, edema, PND or orthopnea. GI:  No changes in bowel movements, no abdominal pain, no bloating, anorexia, nausea, vomiting or heartburn. :  No frequency or dysuria. Denies incontinence. Extremities:  No joint pain, stiffness or swelling. Skin:  No recent rashes or mole changes. Neurological:  No numbness, tingling, burning paresthesias or loss of motor strength. No syncope, dizziness, frequent headaches or memory loss.       Objective:     Vitals:    05/18/18 5744 BP: 119/80   Pulse: 83   Resp: 18   Temp: 98.1 °F (36.7 °C)   TempSrc: Oral   SpO2: 95%   Weight: 282 lb 3.2 oz (128 kg)   Height: 5' 10\" (1.778 m)   PainSc:   0 - No pain       Body mass index is 40.49 kg/(m^2). Physical Examination:              General Appearance:  Well-developed, well-nourished, no acute  distress. HEENT:     Ears:  The TMs and ear canals were clear. Eyes:  The pupillary responses were normal.  Extraocular muscle function intact. Lids and conjunctiva not injected. Neck:  Supple without thyromegaly or adenopathy. No JVD noted. Lungs:  Clear to auscultation and percussion. Cardiac:  Regular rate and rhythm without murmur. GI: nontender w/o mass. Normal BS's. Extremities:  No clubbing, cyanosis or edema. Skin:  No rash or unusual mole changes noted. Neurological:  Grossly normal.            Assessment/Plan:   Impressions:      ICD-10-CM ICD-9-CM    1. Cardiomyopathy, unspecified type ([facility] I42.9 425.4 AMB POC COMPREHENSIVE METABOLIC PANEL   2. Essential hypertension I10 401.9 AMB POC COMPREHENSIVE METABOLIC PANEL   3. Hyperlipidemia, unspecified hyperlipidemia type E78.5 272.4 AMB POC COMPREHENSIVE METABOLIC PANEL      AMB POC LIPID PROFILE   4. IGT (impaired glucose tolerance) R73.02 790.22 AMB POC COMPREHENSIVE METABOLIC PANEL      AMB POC HEMOGLOBIN A1C   5. Long-term current use of high risk medication other than anticoagulant Z79.899 V58.69 AMB POC COMPREHENSIVE METABOLIC PANEL      AMB POC CK (CPK)        Plan:  1. Continue present meds  2. Discussed lifestyle modifications including Na restriction, low carb/fat diet, weight reduction and exercise (at least a walking program). Follow-up Disposition:  Return in about 3 months (around 8/18/2018).       Orders Placed This Encounter    AMB POC COMPREHENSIVE METABOLIC PANEL    AMB POC HEMOGLOBIN A1C    AMB POC CK (CPK)    AMB POC LIPID PROFILE       Christiano Wolf MD

## 2018-05-18 NOTE — PROGRESS NOTES
Ab Morales is a 46 y.o. male  Chief Complaint   Patient presents with    Diabetes     follow up     Visit Vitals    /80 (BP 1 Location: Left arm, BP Patient Position: Sitting)    Pulse 83    Temp 98.1 °F (36.7 °C) (Oral)    Resp 18    Ht 5' 10\" (1.778 m)    Wt 282 lb 3.2 oz (128 kg)    SpO2 95%    BMI 40.49 kg/m2     1. Have you been to the ER, urgent care clinic since your last visit? Hospitalized since your last visit?  no    2. Have you seen or consulted any other health care providers outside of the [address] [place] [address] since your last visit? Include any pap smears or colon screening.  no

## 2018-05-20 RX ORDER — BUMETANIDE 0.5 MG/1
TABLET ORAL
Qty: 30 TAB | Refills: 0 | Status: SHIPPED | OUTPATIENT
Start: 2018-05-20 | End: 2018-07-02 | Stop reason: SDUPTHER

## 2018-05-21 NOTE — PROGRESS NOTES
Blood sugar, liver and kidney function normal.  CK (muscle enzyme) normal.  Lipids excellent. LDL 33  Total cholesterol 88. A1C excellent at  5.5. No change in Rx. Continue diet and exercise.

## 2018-05-22 NOTE — PROGRESS NOTES
Patient informed that Blood sugar, liver and kidney function normal.  CK (muscle enzyme) normal.  Lipids excellent. LDL 33  Total cholesterol 88. A1C excellent at  5.5. No change in Rx. Continue diet and exercise.

## 2018-05-31 DIAGNOSIS — M77.40 METATARSALGIA, UNSPECIFIED LATERALITY: ICD-10-CM

## 2018-05-31 RX ORDER — TOPIRAMATE 50 MG/1
50 TABLET, FILM COATED ORAL 2 TIMES DAILY
Qty: 180 TAB | Refills: 3 | Status: SHIPPED | OUTPATIENT
Start: 2018-05-31 | End: 2020-03-16

## 2018-05-31 NOTE — TELEPHONE ENCOUNTER
Sung Fischer Requested Prescriptions     Pending Prescriptions Disp Refills    topiramate (TOPAMAX) 50 mg tablet 180 Tab 3     Sig: Take 1 Tab by mouth two (2) times a day.

## 2018-07-02 RX ORDER — BUMETANIDE 0.5 MG/1
TABLET ORAL
Qty: 30 TAB | Refills: 0 | Status: SHIPPED | OUTPATIENT
Start: 2018-07-02 | End: 2018-08-05 | Stop reason: SDUPTHER

## 2018-07-13 RX ORDER — AMLODIPINE BESYLATE 5 MG/1
TABLET ORAL
Qty: 90 TAB | Refills: 3 | Status: SHIPPED | OUTPATIENT
Start: 2018-07-13 | End: 2019-07-26 | Stop reason: SDUPTHER

## 2018-07-13 RX ORDER — LOSARTAN POTASSIUM 100 MG/1
TABLET ORAL
Qty: 90 TAB | Refills: 3 | Status: SHIPPED | OUTPATIENT
Start: 2018-07-13 | End: 2019-07-26 | Stop reason: SDUPTHER

## 2018-07-13 NOTE — TELEPHONE ENCOUNTER
Requested Prescriptions     Pending Prescriptions Disp Refills    amLODIPine (NORVASC) 5 mg tablet 90 Tab 3     Sig: TAKE 1 TABLET BY MOUTH EVERY DAY    losartan (COZAAR) 100 mg tablet 90 Tab 3     Sig: TAKE 1 TABLET BY MOUTH EVERY DAY

## 2018-08-06 RX ORDER — BUMETANIDE 0.5 MG/1
TABLET ORAL
Qty: 30 TAB | Refills: 0 | Status: SHIPPED | OUTPATIENT
Start: 2018-08-06 | End: 2018-09-10 | Stop reason: SDUPTHER

## 2018-08-18 DIAGNOSIS — F41.9 ANXIETY: Primary | ICD-10-CM

## 2018-08-19 RX ORDER — BUPROPION HYDROCHLORIDE 300 MG/1
TABLET ORAL
Qty: 90 TAB | Refills: 1 | Status: SHIPPED | OUTPATIENT
Start: 2018-08-19 | End: 2019-02-09 | Stop reason: SDUPTHER

## 2018-08-19 RX ORDER — CLONAZEPAM 0.5 MG/1
TABLET ORAL
Qty: 180 TAB | Refills: 0 | Status: SHIPPED | OUTPATIENT
Start: 2018-08-19 | End: 2018-11-15 | Stop reason: SDUPTHER

## 2018-08-24 ENCOUNTER — OFFICE VISIT (OUTPATIENT)
Dept: INTERNAL MEDICINE CLINIC | Age: 48
End: 2018-08-24

## 2018-08-24 VITALS
DIASTOLIC BLOOD PRESSURE: 79 MMHG | HEART RATE: 75 BPM | TEMPERATURE: 98 F | BODY MASS INDEX: 40.66 KG/M2 | SYSTOLIC BLOOD PRESSURE: 113 MMHG | HEIGHT: 70 IN | WEIGHT: 284 LBS | RESPIRATION RATE: 16 BRPM | OXYGEN SATURATION: 97 %

## 2018-08-24 DIAGNOSIS — K76.0 FATTY LIVER: ICD-10-CM

## 2018-08-24 DIAGNOSIS — F32.A DEPRESSION, UNSPECIFIED DEPRESSION TYPE: ICD-10-CM

## 2018-08-24 DIAGNOSIS — R73.02 IGT (IMPAIRED GLUCOSE TOLERANCE): ICD-10-CM

## 2018-08-24 DIAGNOSIS — I10 ESSENTIAL HYPERTENSION: Primary | ICD-10-CM

## 2018-08-24 NOTE — PROGRESS NOTES
Therese Richards is a 46 y.o. male     Chief Complaint   Patient presents with    Hypertension     3 month follow up       Visit Vitals    /79 (BP 1 Location: Left arm, BP Patient Position: Sitting)    Pulse 75    Temp 98 °F (36.7 °C) (Oral)    Resp 16    Ht 5' 10\" (1.778 m)    Wt 284 lb (128.8 kg)    SpO2 97%    BMI 40.75 kg/m2       Health Maintenance Due   Topic Date Due    FOBT Q 1 YEAR AGE 50-75  03/11/2018    Influenza Age 5 to Adult  08/01/2018       1. Have you been to the ER, urgent care clinic since your last visit? Hospitalized since your last visit? No    2. Have you seen or consulted any other health care providers outside of the [address] [place] [address] since your last visit? Include any pap smears or colon screening.  No

## 2018-08-24 NOTE — PROGRESS NOTES
Subjective:   Jose Alberto Liao is a 46 y.o. male      Chief Complaint   Patient presents with    Hypertension     3 month follow up        History of present illness:  Fariba Tobar returns in follow up. He underwent arthroscopic surgery on his knee in early June and he's had a remarkable improvement in the knee pain. He's back to exercising regularly. He's managed calories, eating no more than 2,500 calories. Despite this he's not lost further weight. His appetite has not been an issue on the Topamax and we're going to continue that as well. I've suggested that he really needs to cut further back on his calories to get off the plateau he's on now. He did not gain weight from his last visit, but he didn't lose any. Overall he still lost 30 lbs, but he needs more. He denies new CR, GI or  symptoms. He's due his comprehensive examination in three months' time. We might consider Saxenda at that point depending on the results of his weight loss with further calorie reduction. He's already exercising and restricting his salt and carbohydrates, so I don't think there is much more to do otherwise.         Patient Active Problem List    Diagnosis Date Noted    Essential hypertension 08/03/2017     Priority: 1 - One    Hyperlipidemia 08/02/2017     Priority: 1 - One    Cervical radiculopathy 11/07/2017     Priority: 2 - Two    TYRESE on CPAP 08/02/2017     Priority: 2 - Two    Knee pain, left 08/02/2017     Priority: 2 - Two    IGT (impaired glucose tolerance) 08/02/2017     Priority: 2 - Two    Depression 08/02/2017     Priority: 2 - Two    Fatty liver 08/02/2017     Priority: 2 - Two    Allergic rhinitis 08/02/2017     Priority: 3 - Three    Plantar fasciitis 08/02/2017     Priority: 3 - Three    BMI 40.0-44.9, adult ([facility] [address] 08/02/2017     Priority: 3 - Three    Asthma 08/02/2017     Priority: 3 - Three    Complex tear of medial meniscus of right knee as current injury 07/27/2017     Priority: 3 - Three    Osteoarthrosis, localized, primary, knee 07/27/2017     Priority: 3 - Three    Long-term current use of high risk medication other than anticoagulant 05/16/2018     Priority: 4 - Four    Deviated nasal septum 08/02/2017     Priority: 4 - Four    Vitamin D deficiency 08/02/2017     Priority: 4 - Four    Metatarsalgia 08/02/2017     Priority: 4 - Four    Insomnia 08/02/2017     Priority: 4 - Four    Cardiomyopathy ([facility] [address] 01/26/2018    Recurrent depression ([facility] [address] 01/11/2018    PE (physical exam), annual 12/10/2017      Past Surgical History:   Procedure Laterality Date    HX KNEE ARTHROSCOPY Left     med menisectomy    HX ORTHOPAEDIC  1999 2006    bilateral shoulder decompressions    HX REFRACTIVE SURGERY Bilateral 2006      Allergies   Allergen Reactions    Lisinopril Unknown (comments)      Family History   Problem Relation Age of Onset    High Cholesterol Mother     Cancer Father      lung    Other Father      lymphoma      Social History     Social History    Marital status: SINGLE     Spouse name: N/A    Number of children: N/A    Years of education: N/A     Occupational History    Not on file. Social History Main Topics    Smoking status: Never Smoker    Smokeless tobacco: Never Used    Alcohol use No    Drug use: Not on file    Sexual activity: Not on file     Other Topics Concern    Not on file     Social History Narrative     Outpatient Prescriptions Marked as Taking for the 8/24/18 encounter (Office Visit) with Harinder West MD   Medication Sig Dispense Refill    buPROPion XL (WELLBUTRIN XL) 300 mg XL tablet TAKE 1 TABLET BY MOUTH DAILY 90 Tab 1    clonazePAM (KLONOPIN) 0.5 mg tablet TAKE 2 TABLETS NIGHTLY AS NEEDED -MAX 1MG A  Tab 0    bumetanide (BUMEX) 0.5 mg tablet TAKE 1 TAB BY MOUTH DAILY.  30 Tab 0    amLODIPine (NORVASC) 5 mg tablet TAKE 1 TABLET BY MOUTH EVERY DAY 90 Tab 3    losartan (COZAAR) 100 mg tablet TAKE 1 TABLET BY MOUTH EVERY DAY 90 Tab 3    topiramate (TOPAMAX) 50 mg tablet Take 1 Tab by mouth two (2) times a day. 180 Tab 3    rosuvastatin (CRESTOR) 5 mg tablet Take 1 Tab by mouth nightly. 90 Tab 3    meloxicam (MOBIC) 15 mg tablet TAKE 1 TABLET EVERY DAY 90 Tab 3    albuterol (PROVENTIL HFA, VENTOLIN HFA, PROAIR HFA) 90 mcg/actuation inhaler Take 2 Puffs by inhalation every four (4) hours as needed for Wheezing.  cholecalciferol, vitamin D3, (VITAMIN D3) 2,000 unit tab Take 2,000 Units by mouth daily.  multivitamin (ONE A DAY) tablet Take 1 Tab by mouth daily.  sodium chloride-aloe vera (AYR SALINE) topical gel Apply  to affected area once.  aspirin delayed-release 81 mg tablet Take 81 mg by mouth daily.  OTHER Indications: ALLERGY SHOTS          Review of Systems              Constitutional:  He denies fever, weight loss, sweats or fatigue. HEENT:  No blurred or double vision, headache or dizziness. No difficulty with swallowing, taste, speech or smell. Respiratory:  No cough, wheezing or shortness of breath. No sputum production. Cardiac:  Denies chest pain, palpitations, unexplained indigestion, syncope, edema, PND or orthopnea. GI:  No changes in bowel movements, no abdominal pain, no bloating, anorexia, nausea, vomiting or heartburn. :  No frequency or dysuria. Denies incontinence. Extremities:  No joint pain, stiffness or swelling. Skin:  No recent rashes or mole changes. Neurological:  No numbness, tingling, burning paresthesias or loss of motor strength. No syncope, dizziness, frequent headaches or memory loss. Objective:     Vitals:    08/24/18 0833   BP: 113/79   Pulse: 75   Resp: 16   Temp: 98 °F (36.7 °C)   TempSrc: Oral   SpO2: 97%   Weight: 284 lb (128.8 kg)   Height: 5' 10\" (1.778 m)       Body mass index is 40.75 kg/(m^2). Physical Examination:              General Appearance:  Well-developed, well-nourished, no acute  distress.              HEENT:     Ears:  The TMs and ear canals were clear.   Eyes:  The pupillary responses were normal.  Extraocular muscle function intact. Lids and conjunctiva not injected. Neck:  Supple without thyromegaly or adenopathy. No JVD noted. Lungs:  Clear to auscultation and percussion. Cardiac:  Regular rate and rhythm without murmur. GI: nontender w/o mass. Normal BS's. Extremities:  No clubbing, cyanosis or edema. Skin:  No rash or unusual mole changes noted. Neurological:  Grossly normal.            Assessment/Plan:   Impressions:      ICD-10-CM ICD-9-CM    1. Essential hypertension I10 401.9 AMB POC COMPREHENSIVE METABOLIC PANEL   2. IGT (impaired glucose tolerance) R73.02 790.22 AMB POC COMPREHENSIVE METABOLIC PANEL      AMB POC HEMOGLOBIN A1C   3. Depression, unspecified depression type F32.9 311 AMB POC COMPREHENSIVE METABOLIC PANEL   4. Fatty liver K76.0 571.8 AMB POC COMPREHENSIVE METABOLIC PANEL        Plan:  1. Continue present meds  2. Discussed lifestyle modifications including Na restriction, low carb/fat diet, weight reduction and exercise (at least a walking program). 3. Cut calories further      Follow-up Disposition:  Return in about 3 months (around 11/24/2018) for CPE.       Orders Placed This Encounter    AMB POC COMPREHENSIVE METABOLIC PANEL    AMB POC HEMOGLOBIN A1C       Carmela Kahn MD

## 2018-08-25 LAB
ALB/GLOBRATIO, 58C: 1.8 (CALC) (ref 1–2.5)
ALBUMIN SERPL-MCNC: 4.6 G/DL (ref 3.6–5.1)
ALP SERPL-CCNC: 121 U/L (ref 40–115)
ALT SERPL-CCNC: 40 U/L (ref 9–46)
AST SERPL W P-5'-P-CCNC: 24 U/L (ref 10–35)
BILIRUB SERPL-MCNC: 0.6 MG/DL (ref 0.2–1.2)
BUN SERPL-MCNC: 19 MG/DL (ref 7–25)
BUN/CREATININE RATIO,BUCR: ABNORMAL (CALC) (ref 6–22)
CALCIUM SERPL-MCNC: 9.5 MG/DL (ref 8.6–10.3)
CHLORIDE SERPL-SCNC: 107 MMOL/L (ref 98–110)
CO2 SERPL-SCNC: 23 MMOL/L (ref 20–32)
CREAT SERPL-MCNC: 1.25 MG/DL (ref 0.7–1.33)
EAG (MG/DL),9916804: 103 (CALC)
EAG (MMOL/L),9916805: 5.7 (CALC)
GLOBULIN,GLOB: 2.5 G/DL (CALC) (ref 1.9–3.7)
GLUCOSE SERPL-MCNC: 91 MG/DL (ref 65–99)
HBA1C MFR BLD HPLC: 5.2 % OF TOTAL HGB
POTASSIUM SERPL-SCNC: 4.3 MMOL/L (ref 3.5–5.3)
PROT SERPL-MCNC: 7.1 G/DL (ref 6.1–8.1)
SODIUM SERPL-SCNC: 139 MMOL/L (ref 135–146)

## 2018-08-27 NOTE — PROGRESS NOTES
Patient informed that Blood sugar, liver and kidney function normal.  A1C excellent at  5.2. No change in Rx.

## 2018-09-10 RX ORDER — BUMETANIDE 0.5 MG/1
TABLET ORAL
Qty: 30 TAB | Refills: 0 | Status: SHIPPED | OUTPATIENT
Start: 2018-09-10 | End: 2018-10-07 | Stop reason: SDUPTHER

## 2018-09-12 DIAGNOSIS — M17.10 OSTEOARTHRITIS, LOCALIZED, KNEE: ICD-10-CM

## 2018-09-12 RX ORDER — MELOXICAM 15 MG/1
TABLET ORAL
Qty: 90 TAB | Refills: 3 | Status: SHIPPED | OUTPATIENT
Start: 2018-09-12 | End: 2019-02-08

## 2018-10-07 RX ORDER — BUMETANIDE 0.5 MG/1
TABLET ORAL
Qty: 30 TAB | Refills: 0 | Status: SHIPPED | OUTPATIENT
Start: 2018-10-07 | End: 2018-11-28 | Stop reason: SDUPTHER

## 2018-11-01 ENCOUNTER — LAB ONLY (OUTPATIENT)
Dept: INTERNAL MEDICINE CLINIC | Age: 48
End: 2018-11-01

## 2018-11-01 DIAGNOSIS — E78.5 HYPERLIPIDEMIA, UNSPECIFIED HYPERLIPIDEMIA TYPE: ICD-10-CM

## 2018-11-01 DIAGNOSIS — Z00.00 PE (PHYSICAL EXAM), ANNUAL: ICD-10-CM

## 2018-11-01 DIAGNOSIS — E55.9 VITAMIN D DEFICIENCY: ICD-10-CM

## 2018-11-01 DIAGNOSIS — R53.83 FATIGUE, UNSPECIFIED TYPE: ICD-10-CM

## 2018-11-01 DIAGNOSIS — Z79.899 LONG-TERM CURRENT USE OF HIGH RISK MEDICATION OTHER THAN ANTICOAGULANT: ICD-10-CM

## 2018-11-01 DIAGNOSIS — R73.02 IGT (IMPAIRED GLUCOSE TOLERANCE): ICD-10-CM

## 2018-11-01 DIAGNOSIS — I10 ESSENTIAL HYPERTENSION: Primary | ICD-10-CM

## 2018-11-01 LAB
BACTERIA UA POCT, BACTPOCT: NORMAL
BILIRUB UR QL STRIP: NEGATIVE
CASTS UA POCT: NORMAL
CLUE CELLS, CLUEPOCT: NEGATIVE
CRYSTALS UA POCT, CRYSPOCT: NEGATIVE
EPITHELIAL CELLS POCT: NEGATIVE
GLUCOSE UR-MCNC: NEGATIVE MG/DL
GRAN# POC: 4.1 K/UL (ref 2–7.8)
GRAN% POC: 69.9 % (ref 37–92)
HCT VFR BLD CALC: 47.4 % (ref 37–51)
HGB BLD-MCNC: 15.9 G/DL (ref 12–18)
KETONES P FAST UR STRIP-MCNC: NEGATIVE MG/DL
LY# POC: 1.5 K/UL (ref 0.6–4.1)
LY% POC: 25.9 % (ref 10–58.5)
MCH RBC QN: 28.8 PG (ref 26–32)
MCHC RBC-ENTMCNC: 33.5 G/DL (ref 30–36)
MCV RBC: 86 FL (ref 80–97)
MID #, POC: 0.2 K/UL (ref 0–1.8)
MID% POC: 4.2 % (ref 0.1–24)
MUCUS UA POCT, MUCPOCT: NORMAL
PH UR STRIP: 7 [PH] (ref 5–7)
PLATELET # BLD: 196 K/UL (ref 140–440)
PROT UR QL STRIP: NEGATIVE
RBC # BLD: 5.5 M/UL (ref 4.2–6.3)
RBC UA POCT, RBCPOCT: 0
SP GR UR STRIP: 1.01 (ref 1.01–1.02)
TRICH UA POCT, TRICHPOC: NEGATIVE
TSH BLD-ACNC: 1.49 UIU/ML (ref 0.4–4.2)
UA UROBILINOGEN AMB POC: NORMAL (ref 0.2–1)
URINALYSIS CLARITY POC: CLEAR
URINALYSIS COLOR POC: YELLOW
URINE BLOOD POC: NEGATIVE
URINE CULT COMMENT, POCT: NORMAL
URINE LEUKOCYTES POC: NEGATIVE
URINE NITRITES POC: NEGATIVE
VITAMIN D POC: 32.8 NG/ML (ref 30–96)
WBC # BLD: 5.8 K/UL (ref 4.1–10.9)
WBC UA POCT, WBCPOCT: 0
YEAST UA POCT, YEASTPOC: NEGATIVE

## 2018-11-02 LAB
CHOLEST SERPL-MCNC: 97 MG/DL (ref 0–200)
CK (CPK) POC: 63 U/L (ref 30–135)
HDLC SERPL-MCNC: 38 MG/DL (ref 35–130)
LDL CHOLESTEROL POC: 36 MG/DL (ref 0–130)
TCHOL/HDL RATIO (POC): 2.6 (ref 0–4)
TRIGL SERPL-MCNC: 115 MG/DL (ref 0–200)
VLDLC SERPL CALC-MCNC: 23 MG/DL

## 2018-11-05 LAB — HBA1C MFR BLD HPLC: 5.7 % (ref 4.5–5.7)

## 2018-11-07 LAB
ALBUMIN SERPL-MCNC: 4 G/DL (ref 3.9–5.4)
ALKALINE PHOS POC: 103 U/L (ref 38–126)
ALT SERPL-CCNC: 50 U/L (ref 9–52)
AST SERPL-CCNC: 29 U/L (ref 14–36)
BUN BLD-MCNC: 18 MG/DL (ref 9–20)
CALCIUM BLD-MCNC: 9.4 MG/DL (ref 8.4–10.2)
CHLORIDE BLD-SCNC: 105 MMOL/L (ref 98–107)
CO2 POC: 24 MMOL/L (ref 22–32)
CREAT BLD-MCNC: 1.2 MG/DL (ref 0.8–1.5)
EGFR (POC): 70.1
GLUCOSE POC: 112 MG/DL (ref 75–110)
POTASSIUM SERPL-SCNC: 4.2 MMOL/L (ref 3.6–5)
PROT SERPL-MCNC: 6.7 G/DL (ref 6.3–8.2)
SODIUM SERPL-SCNC: 141 MMOL/L (ref 137–145)
TOTAL BILIRUBIN POC: 0.5 MG/DL (ref 0.2–1.3)

## 2018-11-09 ENCOUNTER — OFFICE VISIT (OUTPATIENT)
Dept: INTERNAL MEDICINE CLINIC | Age: 48
End: 2018-11-09

## 2018-11-09 VITALS
WEIGHT: 285.4 LBS | RESPIRATION RATE: 14 BRPM | BODY MASS INDEX: 40.86 KG/M2 | OXYGEN SATURATION: 97 % | SYSTOLIC BLOOD PRESSURE: 112 MMHG | TEMPERATURE: 98.1 F | HEART RATE: 79 BPM | HEIGHT: 70 IN | DIASTOLIC BLOOD PRESSURE: 80 MMHG

## 2018-11-09 DIAGNOSIS — E78.5 HYPERLIPIDEMIA, UNSPECIFIED HYPERLIPIDEMIA TYPE: Primary | ICD-10-CM

## 2018-11-09 DIAGNOSIS — E55.9 VITAMIN D DEFICIENCY: ICD-10-CM

## 2018-11-09 DIAGNOSIS — F51.01 PRIMARY INSOMNIA: ICD-10-CM

## 2018-11-09 DIAGNOSIS — J30.9 ALLERGIC RHINITIS, UNSPECIFIED SEASONALITY, UNSPECIFIED TRIGGER: ICD-10-CM

## 2018-11-09 DIAGNOSIS — K76.0 FATTY LIVER: ICD-10-CM

## 2018-11-09 DIAGNOSIS — Z12.11 COLON CANCER SCREENING: ICD-10-CM

## 2018-11-09 DIAGNOSIS — M77.41 METATARSALGIA OF BOTH FEET: ICD-10-CM

## 2018-11-09 DIAGNOSIS — Z23 ENCOUNTER FOR IMMUNIZATION: ICD-10-CM

## 2018-11-09 DIAGNOSIS — Z79.899 LONG-TERM CURRENT USE OF HIGH RISK MEDICATION OTHER THAN ANTICOAGULANT: ICD-10-CM

## 2018-11-09 DIAGNOSIS — M77.42 METATARSALGIA OF BOTH FEET: ICD-10-CM

## 2018-11-09 DIAGNOSIS — M54.12 CERVICAL RADICULOPATHY: ICD-10-CM

## 2018-11-09 DIAGNOSIS — M72.2 PLANTAR FASCIITIS: ICD-10-CM

## 2018-11-09 DIAGNOSIS — M25.562 CHRONIC PAIN OF LEFT KNEE: ICD-10-CM

## 2018-11-09 DIAGNOSIS — M17.10 PRIMARY LOCALIZED OSTEOARTHROSIS OF LOWER LEG, UNSPECIFIED LATERALITY: ICD-10-CM

## 2018-11-09 DIAGNOSIS — F33.9 RECURRENT DEPRESSION (HCC): ICD-10-CM

## 2018-11-09 DIAGNOSIS — J45.20 MILD INTERMITTENT ASTHMA WITHOUT COMPLICATION: ICD-10-CM

## 2018-11-09 DIAGNOSIS — J45.909 UNCOMPLICATED ASTHMA, UNSPECIFIED ASTHMA SEVERITY, UNSPECIFIED WHETHER PERSISTENT: ICD-10-CM

## 2018-11-09 DIAGNOSIS — R73.02 IGT (IMPAIRED GLUCOSE TOLERANCE): ICD-10-CM

## 2018-11-09 DIAGNOSIS — Z99.89 OSA ON CPAP: ICD-10-CM

## 2018-11-09 DIAGNOSIS — I42.9 CARDIOMYOPATHY, UNSPECIFIED TYPE (HCC): ICD-10-CM

## 2018-11-09 DIAGNOSIS — I10 ESSENTIAL HYPERTENSION: ICD-10-CM

## 2018-11-09 DIAGNOSIS — S83.231S COMPLEX TEAR OF MEDIAL MENISCUS OF RIGHT KNEE AS CURRENT INJURY, SEQUELA: ICD-10-CM

## 2018-11-09 DIAGNOSIS — J34.2 DEVIATED NASAL SEPTUM: ICD-10-CM

## 2018-11-09 DIAGNOSIS — G47.33 OSA ON CPAP: ICD-10-CM

## 2018-11-09 DIAGNOSIS — G89.29 CHRONIC PAIN OF LEFT KNEE: ICD-10-CM

## 2018-11-09 LAB — SPIROMETRY INTERPRETATION, SPITPOCT: NORMAL

## 2018-11-09 NOTE — PATIENT INSTRUCTIONS
Vaccine Information Statement    Influenza (Flu) Vaccine (Inactivated or Recombinant): What you need to know    Many Vaccine Information Statements are available in [place] and other languages. See www.immunize.org/vis  Hojas de Información Sobre Vacunas están disponibles en Español y en muchos otros idiomas. Visite www.immunize.org/vis    1. Why get vaccinated? Influenza (flu) is a contagious disease that spreads around the [place] every year, usually between October and May. Flu is caused by influenza viruses, and is spread mainly by coughing, sneezing, and close contact. Anyone can get flu. Flu strikes suddenly and can last several days. Symptoms vary by age, but can include:   fever/chills   sore throat   muscle aches   fatigue   cough   headache    runny or stuffy nose    Flu can also lead to pneumonia and blood infections, and cause diarrhea and seizures in children. If you have a medical condition, such as heart or lung disease, flu can make it worse. Flu is more dangerous for some people. Infants and young children, people 72years of age and older, pregnant women, and people with certain health conditions or a weakened immune system are at greatest risk. Each year thousands of people in the [facility] die from flu, and many more are hospitalized. Flu vaccine can:   keep you from getting flu,   make flu less severe if you do get it, and   keep you from spreading flu to your family and other people. 2. Inactivated and recombinant flu vaccines    A dose of flu vaccine is recommended every flu season. Children 6 months through 6years of age may need two doses during the same flu season. Everyone else needs only one dose each flu season.        Some inactivated flu vaccines contain a very small amount of a mercury-based preservative called thimerosal. Studies have not shown thimerosal in vaccines to be harmful, but flu vaccines that do not contain thimerosal are available. There is no live flu virus in flu shots. They cannot cause the flu. There are many flu viruses, and they are always changing. Each year a new flu vaccine is made to protect against three or four viruses that are likely to cause disease in the upcoming flu season. But even when the vaccine doesnt exactly match these viruses, it may still provide some protection    Flu vaccine cannot prevent:   flu that is caused by a virus not covered by the vaccine, or   illnesses that look like flu but are not. It takes about 2 weeks for protection to develop after vaccination, and protection lasts through the flu season. 3. Some people should not get this vaccine    Tell the person who is giving you the vaccine:     If you have any severe, life-threatening allergies. If you ever had a life-threatening allergic reaction after a dose of flu vaccine, or have a severe allergy to any part of this vaccine, you may be advised not to get vaccinated. Most, but not all, types of flu vaccine contain a small amount of egg protein.  If you ever had Guillain-Barré Syndrome (also called GBS). Some people with a history of GBS should not get this vaccine. This should be discussed with your doctor.  If you are not feeling well. It is usually okay to get flu vaccine when you have a mild illness, but you might be asked to come back when you feel better. 4. Risks of a vaccine reaction    With any medicine, including vaccines, there is a chance of reactions. These are usually mild and go away on their own, but serious reactions are also possible. Most people who get a flu shot do not have any problems with it.      Minor problems following a flu shot include:    soreness, redness, or swelling where the shot was given     hoarseness   sore, red or itchy eyes   cough   fever   aches   headache   itching   fatigue  If these problems occur, they usually begin soon after the shot and last 1 or 2 days. More serious problems following a flu shot can include the following:     There may be a small increased risk of Guillain-Barré Syndrome (GBS) after inactivated flu vaccine. This risk has been estimated at 1 or 2 additional cases per million people vaccinated. This is much lower than the risk of severe complications from flu, which can be prevented by flu vaccine.  Young children who get the flu shot along with pneumococcal vaccine (PCV13) and/or DTaP vaccine at the same time might be slightly more likely to have a seizure caused by fever. Ask your doctor for more information. Tell your doctor if a child who is getting flu vaccine has ever had a seizure. Problems that could happen after any injected vaccine:      People sometimes faint after a medical procedure, including vaccination. Sitting or lying down for about 15 minutes can help prevent fainting, and injuries caused by a fall. Tell your doctor if you feel dizzy, or have vision changes or ringing in the ears.  Some people get severe pain in the shoulder and have difficulty moving the arm where a shot was given. This happens very rarely.  Any medication can cause a severe allergic reaction. Such reactions from a vaccine are very rare, estimated at about 1 in a million doses, and would happen within a few minutes to a few hours after the vaccination. As with any medicine, there is a very remote chance of a vaccine causing a serious injury or death. The safety of vaccines is always being monitored. For more information, visit: www.cdc.gov/vaccinesafety/    5. What if there is a serious reaction? What should I look for?  Look for anything that concerns you, such as signs of a severe allergic reaction, very high fever, or unusual behavior.     Signs of a severe allergic reaction can include hives, swelling of the face and throat, difficulty breathing, a fast heartbeat, dizziness, and weakness - usually within a few minutes to a few hours after the vaccination. What should I do?  If you think it is a severe allergic reaction or other emergency that cant wait, call 9-1-1 and get the person to the nearest hospital. Otherwise, call your doctor.  Reactions should be reported to the Vaccine Adverse Event Reporting System (VAERS). Your doctor should file this report, or you can do it yourself through  the VAERS web site at www.vaers. [facility].gov, or by calling 2-456.783.6011. VAERS does not give medical advice. 6. The National Vaccine Injury Compensation Program    The [facility] Vaccine Injury Compensation Program (VICP) is a federal program that was created to compensate people who may have been injured by certain vaccines. Persons who believe they may have been injured by a vaccine can learn about the program and about filing a claim by calling 0-908.593.7427 or visiting the [employer] website at www.[facility].gov/vaccinecompensation. There is a time limit to file a claim for compensation. 7. How can I learn more?  Ask your healthcare provider. He or she can give you the vaccine package insert or suggest other sources of information.  Call your local or state health department.  Contact the Centers for Disease Control and Prevention (CDC):  - Call 1-545.498.9085 (1-800-CDC-INFO) or  - Visit CDCs website at www.cdc.gov/flu    Vaccine Information Statement   Inactivated Influenza Vaccine   8/7/2015  42 DUGLAS Darby [id number]    Department of Health and Human Services  Centers for Disease Control and Prevention    Office Use Only

## 2018-11-09 NOTE — PROGRESS NOTES
Reviewed record in preparation for visit and have obtained necessary documentation. Identified pt with two pt identifiers(name and ). Chief Complaint   Patient presents with    Complete Physical        Coordination of Care Questionnaire:  :     1) Have you been to an emergency room, urgent care clinic since your last visit? No     Hospitalized since your last visit? No               2) Have you seen or consulted any other health care providers outside of [address] [place] [address] since your last visit?  No Dr Deanna Marie

## 2018-11-09 NOTE — PROGRESS NOTES
Mr. Yamil Calvin is a 46year old white single male who comes to the office today for annual comprehensive personal healthcare examination.     History of Present Illness: This patient has multiple medical problems. These include:  1. Dyslipidemia. 2. Hypertension. 3. 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 4. TYRESE, on CPAP at 7.  5. Fatty liver. 6. Vitamin D deficiency. 7. History of depression. 8. Seasonal allergies and episodic asthma, currently on allergy shots. 9. History of metatarsalgia and plantar fasciitis with previous right ankle injury. This has been improved with better shoe wear. 10. Deviated nasal septum. 11. History of insomnia. 12. History of impaired glucose tolerance. 13. Obesity with BMI greater than 40.  14. Bilateral medial meniscal tear, status post arthroscopic repair on the left. This improved the knee pain. He subsequently had arthroscopic surgery on the right knee, which has improved things as well. He is not having that much in the way of pain and we talked about tapering off of Meloxicam if possible. 15. Unspecified eating disorder with poor ability to lose weight. 16. Cervical spondylosis and radiculopathy with left arm pain and numbness at one point. Physical therapy improved this significantly. MRI of the cervical spine revealed C6-7 disc and left foraminal stenosis.     Past Medical History:  Otherwise remarkable for LASIK surgery, bilateral shoulder decompressions, and the bilateral arthroscopic knee surgeries, as well as the sebaceous cyst excision from the right side of his neck.     Allergies:  Lisinopril causes a cough.     Current Medications:  1. Aspirin 81 mg daily. 2. Vitamin D3 2,000 IU daily. 3. Bupropion  mg daily. 4. Multivitamin daily. 5. Albuterol MDI as needed. 6. AYR gel as needed. 7. Klonopin 0.5 mg, one to two at bedtime. 8. Losartan 100 mg daily. 9. Amlodipine 5 mg daily. 10. Crestor 5 mg daily.   11. Allergy shots.  12. Bumetanide 0.5 mg daily. 13. Topamax 50 mg twice a day. 14. Zyrtec 10 mg daily. 15. Meloxicam 15 mg daily, which he will try to taper.     Social History:  He is a former , now working for [employer]. He does not smoke. He drinks alcohol occasionally. Up until more recently his diet had been more prudent and he was exercising more, but work has kept him from this recently.     Family History:  Father had a lymphoma and lung cancer and  at age 76. Mother has hyperlipidemia and is in her 76s. He has no siblings.     Review of Systems:  CONSTITUTIONAL:  Denies fevers, weight loss, sweats, or fatigue. HEENT:  No blurred or double vision, headaches or dizziness. No difficulty with swallowing, taste, speech or smell. RESPIRATORY:  No cough, wheezing or shortness of breath, or sputum production. CARDIAC:  Denies chest pain, palpitations, unexplained indigestion, syncope, edema, PND or orthopnea. GI:  No changes in bowel habits, abdominal pain, no bloating, anorexia, nausea, vomiting or heartburn. :  Denies frequency, nocturia, stranguria, dysuria or sexual dysfunction. EXTREMITIES:  No joint pain, stiffness or swelling. SKIN:  No recent rash or mole changes. NEUROLOGICAL:  No numbness, tingling, burning paresthesias or loss of motor strength. No syncope, dizziness, frequent headaches or memory loss. Physical Examination:  GENERAL:  Well-developed, well-nourished, no acute distress. VITAL SIGNS:  BP: 112/80. P: 79. R: 14.  T: 98.1. HT: 5'10\". WT: 285 lbs. BMI: 40.9. VISION:  Deferred to ophthalmologist.    HEARING:  Essentially normal.  HEENT:  Ears:  The TMs and ear canals were clear. Eyes:  The pupillary responses were normal.  Extraocular muscle function intact. Lids and conjunctiva not injected. Funduscopic exam revealed sharp disc margins. Pharynx:  Clear with teeth in good repair. No masses were noted.     NECK:  Supple without thyromegaly or adenopathy. No JVD noted. No carotid bruits. LUNGS: Clear to auscultation and percussion. CARDIAC:  Regular rate and rhythm. No murmur. PMI not displaced. No gallop, rub or click. ABDOMEN:  Obese, soft, non-tender without palpable organomegaly or mass. No pulsatile mass was felt, and no bruit was heard. Bowel sounds were active. :  Circumcised male. Both testes descended and no masses felt. RECTAL EXAM:  Deferred as he will be undergoing colonoscopy in the near future. EXTREMITIES:  No clubbing, cyanosis or edema. PULSES:  Dorsalis pedis and posterior tibial pulses felt without difficulty. SKIN:  No unusual rash or mole changes noted. LYMPH NODES:  None felt in the cervical, supraclavicular, axillary or inguinal region. NEUROLOGICAL:  Cranial nerves II-XII grossly intact. Motor strength 5/5. DTRs 2+ and symmetric. Station and gait normal.     Laboratory:  Hemoglobin is 15.9. White blood count is 5,800. Blood sugar is 112. Liver and kidney function are normal.  Electrolytes are normal.  A1c 5.7. CK 63. TSH is 1.49. Urinalysis is clear. Vitamin D level is 32.8. Lipid profile reveals a total cholesterol of 97, triglycerides of 115, HDL cholesterol of 38 and an LDL cholesterol of 36.     Health Maintenance Issues and Ancillary Studies:  1. TDAP (pertussis and tetanus) vaccine was given in October, 2013.  2. Seasonal influenza vaccine was given on the day of the visit. 3. Pneumovax 23 given October, 2011. 4. EBT heart scan revealed a calcium score of 37.6 in October, 2016.  5. Nuclear stress testing was negative in February, 2018 with ejection fraction of 47%. 6. Echocardiogram in January, 2018 revealed ejection fraction of 37-00% and diastolic dysfunction. 7. Cervical spine MRI revealed a C6-7 HNP with left foraminal stenosis in August, 2017. 8. MRI of both knees, the right in June, 2017 and the left in July, 2016 revealed medial meniscal tears. 9.  EKG in the office is normal.  10. Pulmonary function studies are normal.  11. Health screening assessments are essentially normal.     Impression:  1. Hypertension, good control. 2. Dyslipidemia. 3. Mild cardiomyopathy with diastolic dysfunction, improved shortness of breath with diuretics, etc.  4. Impaired glucose tolerance. 5. TYRESE, on CPAP. 6. History of fatty liver. 7. Vitamin D deficiency. 8. Mild depression. 9. Obesity. 10. Seasonal allergies and episodic asthma. 11. History of metatarsalgia and plantar fasciitis. 12. Deviated nasal septum. 13. Bilateral medial meniscal tears, both knees, status post arthroscopic surgery. 14. Unspecified eating disorder with inability to lose weight. 15. Status post excision sebaceous cyst right side of neck. 16. Status post other surgeries as noted.     Plan:  1. Continue present medications. 2. He needs to try to get back on the wagon regarding diet, exercise and weight reduction. 3. Recheck three months' time. 4. He is appropriately treated for his ten year coronary heart disease risk. 5. Referred for colonoscopy. All screenings were reviewed and results discussed with the patient, who verbalized understand and agreement with the plans. A copy of the after visit summary with a personalized health plan was provided to the patient on the day of the visit.

## 2018-11-09 NOTE — PROGRESS NOTES
After obtaining consent, and per verbal order from Dr. Carly Caban , patient received influenza vaccine given by Jillian Hearn LPN in Right Deltoid. Influenza Vaccine 0.5 mL IM now. Patient was observed for 15 minutes post injection. Patient tolerated injection well with no adverse effects. VIS given.

## 2018-11-15 DIAGNOSIS — F41.9 ANXIETY: ICD-10-CM

## 2018-11-15 RX ORDER — CLONAZEPAM 0.5 MG/1
TABLET ORAL
Qty: 180 TAB | Refills: 0 | Status: SHIPPED | OUTPATIENT
Start: 2018-11-15 | End: 2019-02-10 | Stop reason: SDUPTHER

## 2018-11-15 NOTE — TELEPHONE ENCOUNTER
Patient Last Seen:  11- without labs    Last labs done: 11-    Next appointment:  02-    Last Date Filled:  11-    Requested Prescriptions     Pending Prescriptions Disp Refills    clonazePAM (KLONOPIN) 0.5 mg tablet [Pharmacy Med Name: CLONAZEPAM 0.5 MG TABLET] 180 Tab 0     Sig: TAKE 2 TABLETS BY MOUTH NIGHTLY AS NEEDED**MAX 2 TABS A DAY

## 2019-02-04 RX ORDER — ATORVASTATIN CALCIUM 10 MG/1
10 TABLET, FILM COATED ORAL DAILY
Qty: 90 TAB | Refills: 3 | Status: SHIPPED | OUTPATIENT
Start: 2019-02-04 | End: 2020-04-07 | Stop reason: SDUPTHER

## 2019-02-04 NOTE — TELEPHONE ENCOUNTER
Pharmacy requesting alternative medication for Rosuvastatin 5 mg because of cost. Per Dr Joshua Omalley can change to Lipitor 10 mg daily.

## 2019-02-08 ENCOUNTER — OFFICE VISIT (OUTPATIENT)
Dept: INTERNAL MEDICINE CLINIC | Age: 49
End: 2019-02-08

## 2019-02-08 VITALS
WEIGHT: 289.2 LBS | OXYGEN SATURATION: 96 % | SYSTOLIC BLOOD PRESSURE: 118 MMHG | DIASTOLIC BLOOD PRESSURE: 84 MMHG | HEART RATE: 81 BPM | BODY MASS INDEX: 41.4 KG/M2 | HEIGHT: 70 IN | TEMPERATURE: 98.5 F

## 2019-02-08 DIAGNOSIS — R73.02 IGT (IMPAIRED GLUCOSE TOLERANCE): Primary | ICD-10-CM

## 2019-02-08 DIAGNOSIS — I42.9 CARDIOMYOPATHY, UNSPECIFIED TYPE (HCC): ICD-10-CM

## 2019-02-08 DIAGNOSIS — K76.0 FATTY LIVER: ICD-10-CM

## 2019-02-08 DIAGNOSIS — F33.9 RECURRENT DEPRESSION (HCC): ICD-10-CM

## 2019-02-08 DIAGNOSIS — I10 ESSENTIAL HYPERTENSION: ICD-10-CM

## 2019-02-08 NOTE — PROGRESS NOTES
Subjective:   Jennifer Bojorquez is a 46 y.o. male      Chief Complaint   Patient presents with    Hypertension    Cholesterol Problem        History of present illness:  Pedro Lee returns in follow up. 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 His job responsibilities have also increased. He is becoming a little bit more depressed and will call us if he needs to see someone and will make the referral to Dr. Torsten Salcido nurse practitioners, etc.  He had seen Dr. Sherwin Infante in the past, but she is now retired. He feels he is reasonably stable on his current medications, however. He denies new cardiorespiratory, GI or  symptoms. He does have some dyspnea on exertion, but this seems more related to deconditioning and his cardiomyopathy. He is tolerating the diuretic without difficulties. His blood pressure is excellent. He is due for follow up regarding his fatty liver and diabetes. His insurance company is forcing a change from Crestor to Atorvastatin and we have already sent that in. We will check his lipids at his follow up to see if that has made any difference to where he stands with regard to that. He denies other new issues.         Patient Active Problem List    Diagnosis Date Noted    Cardiomyopathy [facility]) 01/26/2018     Priority: 1 - One    Essential hypertension 08/03/2017     Priority: 1 - One    Hyperlipidemia 08/02/2017     Priority: 1 - One    Recurrent depression ([facility] [address] 01/11/2018     Priority: 2 - Two    Cervical radiculopathy 11/07/2017     Priority: 2 - Two    TYRESE on CPAP 08/02/2017     Priority: 2 - Two    Knee pain, left 08/02/2017     Priority: 2 - Two    IGT (impaired glucose tolerance) 08/02/2017     Priority: 2 - Two    Depression 08/02/2017     Priority: 2 - Two    Fatty liver 08/02/2017     Priority: 2 - Two    Allergic rhinitis 08/02/2017     Priority: 3 - Three    Plantar fasciitis 08/02/2017     Priority: 3 - Three    BMI 40.0-44.9, adult ([facility] [address] 08/02/2017     Priority: 3 - Three    Asthma 08/02/2017     Priority: 3 - Three    Complex tear of medial meniscus of right knee as current injury 07/27/2017     Priority: 3 - Three    Osteoarthrosis, localized, primary, knee 07/27/2017     Priority: 3 - Three    Long-term current use of high risk medication other than anticoagulant 05/16/2018     Priority: 4 - Four    Deviated nasal septum 08/02/2017     Priority: 4 - Four    Vitamin D deficiency 08/02/2017     Priority: 4 - Four    Metatarsalgia 08/02/2017     Priority: 4 - Four    Insomnia 08/02/2017     Priority: 4 - Four    PE (physical exam), annual 12/10/2017      Past Surgical History:   Procedure Laterality Date    HX KNEE ARTHROSCOPY Left     med menisectomy    HX ORTHOPAEDIC  1999 2006    bilateral shoulder decompressions    HX REFRACTIVE SURGERY Bilateral 2006      Allergies   Allergen Reactions    Lisinopril Unknown (comments)      Family History   Problem Relation Age of Onset    High Cholesterol Mother     Cancer Father         lung    Other Father         lymphoma      Social History     Socioeconomic History    Marital status: SINGLE     Spouse name: Not on file    Number of children: Not on file    Years of education: Not on file    Highest education level: Not on file   Social Needs    Financial resource strain: Not on file    Food insecurity - worry: Not on file    Food insecurity - inability: Not on file   [employer] needs - medical: Not on file   [employer] needs - non-medical: Not on file   Occupational History    Not on file   Tobacco Use    Smoking status: Never Smoker    Smokeless tobacco: Never Used   Substance and Sexual Activity    Alcohol use: No    Drug use: Not on file    Sexual activity: Not on file   Other Topics Concern    Not on file   Social History Narrative    Not on file     Outpatient Medications Marked as Taking for the 2/8/19 encounter (Office Visit) with Heraclio Blue MD   Medication Sig Dispense Refill    atorvastatin (LIPITOR) 10 mg tablet Take 1 Tab by mouth daily. 90 Tab 3    bumetanide (BUMEX) 0.5 mg tablet TAKE 1 TAB BY MOUTH DAILY. 90 Tab 1    clonazePAM (KLONOPIN) 0.5 mg tablet TAKE 2 TABLETS BY MOUTH NIGHTLY AS NEEDED**MAX 2 TABS A  Tab 0    Cetirizine (ZYRTEC) 10 mg cap Take  by mouth daily.  buPROPion XL (WELLBUTRIN XL) 300 mg XL tablet TAKE 1 TABLET BY MOUTH DAILY 90 Tab 1    amLODIPine (NORVASC) 5 mg tablet TAKE 1 TABLET BY MOUTH EVERY DAY 90 Tab 3    losartan (COZAAR) 100 mg tablet TAKE 1 TABLET BY MOUTH EVERY DAY 90 Tab 3    topiramate (TOPAMAX) 50 mg tablet Take 1 Tab by mouth two (2) times a day. 180 Tab 3    albuterol (PROVENTIL HFA, VENTOLIN HFA, PROAIR HFA) 90 mcg/actuation inhaler Take 2 Puffs by inhalation every four (4) hours as needed for Wheezing.  cholecalciferol, vitamin D3, (VITAMIN D3) 2,000 unit tab Take 2,000 Units by mouth daily.  multivitamin (ONE A DAY) tablet Take 1 Tab by mouth daily.  sodium chloride-aloe vera (AYR SALINE) topical gel Apply  to affected area once.  aspirin delayed-release 81 mg tablet Take 81 mg by mouth daily.  OTHER Indications: ALLERGY SHOTS          Review of Systems              Constitutional:  He denies fever, weight loss, sweats or fatigue. HEENT:  No blurred or double vision, headache or dizziness. No difficulty with swallowing, taste, speech or smell. Respiratory:  No cough, wheezing or shortness of breath. No sputum production. Cardiac:  Denies chest pain, palpitations, unexplained indigestion, syncope, edema, PND or orthopnea. GI:  No changes in bowel movements, no abdominal pain, no bloating, anorexia, nausea, vomiting or heartburn. :  No frequency or dysuria. Denies incontinence. Extremities:  No joint pain, stiffness or swelling. Skin:  No recent rashes or mole changes.   Neurological:  No numbness, tingling, burning paresthesias or loss of motor strength. No syncope, dizziness, frequent headaches or memory loss. Objective:     Vitals:    02/08/19 0805   BP: 118/84   Pulse: 81   Temp: 98.5 °F (36.9 °C)   TempSrc: Oral   SpO2: 96%   Weight: 289 lb 3.2 oz (131.2 kg)   Height: 5' 10\" (1.778 m)   PainSc:   0 - No pain       Body mass index is 41.5 kg/m². Physical Examination:              General Appearance:  Well-developed, well-nourished, no acute  distress. HEENT:     Ears:  The TMs and ear canals were clear. Eyes:  The pupillary responses were normal.  Extraocular muscle function intact. Lids and conjunctiva not injected. Neck:  Supple without thyromegaly or adenopathy. No JVD noted. Lungs:  Clear to auscultation and percussion. Cardiac:  Regular rate and rhythm without murmur. GI: nontender w/o mass. Normal BS's. Extremities:  No clubbing, cyanosis or edema. Skin:  No rash or unusual mole changes noted. Neurological:  Grossly normal.            Assessment/Plan:   Impressions:      ICD-10-CM ICD-9-CM    1. IGT (impaired glucose tolerance) R73.02 790.22 HEMOGLOBIN A1C W/O EAG      METABOLIC PANEL, COMPREHENSIVE      CANCELED: AMB POC COMPREHENSIVE METABOLIC PANEL   2. Essential hypertension X46 578.6 METABOLIC PANEL, COMPREHENSIVE   3. Cardiomyopathy, unspecified type (HCC) O49.9 041.4 METABOLIC PANEL, COMPREHENSIVE   4. Fatty liver M65.0 297.9 METABOLIC PANEL, COMPREHENSIVE   5. Recurrent depression (HCC) F33.9 296.30    6. BMI 40.0-44.9, [facility] Z68.41 V85.41         Plan:  1. Continue present meds  2. Discussed lifestyle modifications including Na restriction, low carb/fat diet, weight reduction and exercise (at least a walking program). Follow-up Disposition:  Return in about 3 months (around 5/8/2019) for Fasting.       Orders Placed This Encounter    HEMOGLOBIN A1C W/O Sharron Courtney PANEL, COMPREHENSIVE       Kory Anton MD

## 2019-02-08 NOTE — PROGRESS NOTES
Reviewed record in preparation for visit and have obtained necessary documentation. Identified pt with two pt identifiers(name and ). Chief Complaint   Patient presents with    Hypertension    Cholesterol Problem        Coordination of Care Questionnaire:  :     1) Have you been to an emergency room, urgent care clinic since your last visit? No     Hospitalized since your last visit? No               2) Have you seen or consulted any other health care providers outside of [address] [place] [address] since your last visit?   Yes Dr Nguyen Call Colonoscopy in December

## 2019-02-09 LAB
ALBUMIN SERPL-MCNC: 4.4 G/DL (ref 3.5–5.5)
ALBUMIN/GLOB SERPL: 1.8 {RATIO} (ref 1.2–2.2)
ALP SERPL-CCNC: 121 IU/L (ref 39–117)
ALT SERPL-CCNC: 32 IU/L (ref 0–44)
AST SERPL-CCNC: 18 IU/L (ref 0–40)
BILIRUB SERPL-MCNC: 0.4 MG/DL (ref 0–1.2)
BUN SERPL-MCNC: 11 MG/DL (ref 6–24)
BUN/CREAT SERPL: 11 (ref 9–20)
CALCIUM SERPL-MCNC: 9.3 MG/DL (ref 8.7–10.2)
CHLORIDE SERPL-SCNC: 108 MMOL/L (ref 96–106)
CO2 SERPL-SCNC: 18 MMOL/L (ref 20–29)
CREAT SERPL-MCNC: 0.96 MG/DL (ref 0.76–1.27)
GLOBULIN SER CALC-MCNC: 2.5 G/DL (ref 1.5–4.5)
GLUCOSE SERPL-MCNC: 110 MG/DL (ref 65–99)
HBA1C MFR BLD: 5.5 % (ref 4.8–5.6)
POTASSIUM SERPL-SCNC: 4.3 MMOL/L (ref 3.5–5.2)
PROT SERPL-MCNC: 6.9 G/DL (ref 6–8.5)
SODIUM SERPL-SCNC: 141 MMOL/L (ref 134–144)

## 2019-02-10 DIAGNOSIS — F41.9 ANXIETY: ICD-10-CM

## 2019-02-10 RX ORDER — BUPROPION HYDROCHLORIDE 300 MG/1
TABLET ORAL
Qty: 90 TAB | Refills: 1 | Status: SHIPPED | OUTPATIENT
Start: 2019-02-10 | End: 2019-12-02 | Stop reason: SDUPTHER

## 2019-02-10 NOTE — PROGRESS NOTES
A1C excellent at  5.5. Blood sugar, liver and kidney function normal.  No change in Rx.   Please reply back that you received this message

## 2019-02-11 RX ORDER — CLONAZEPAM 0.5 MG/1
TABLET ORAL
Qty: 180 TAB | Refills: 0 | Status: SHIPPED | OUTPATIENT
Start: 2019-02-11 | End: 2019-05-13 | Stop reason: SDUPTHER

## 2019-02-21 NOTE — PROGRESS NOTES
PHI verified. Patient informed that Dr. Angel Macedo has reviewed lab results and stated A1C excellent at  5.5. Blood sugar, liver and kidney function normal.  No change in Rx.

## 2019-04-26 RX ORDER — BUMETANIDE 0.5 MG/1
TABLET ORAL
Qty: 90 TAB | Refills: 1 | Status: SHIPPED | OUTPATIENT
Start: 2019-04-26 | End: 2019-12-02 | Stop reason: SDUPTHER

## 2019-05-10 ENCOUNTER — OFFICE VISIT (OUTPATIENT)
Dept: INTERNAL MEDICINE CLINIC | Age: 49
End: 2019-05-10

## 2019-05-10 VITALS
TEMPERATURE: 97.9 F | HEIGHT: 70 IN | SYSTOLIC BLOOD PRESSURE: 122 MMHG | WEIGHT: 271 LBS | HEART RATE: 81 BPM | DIASTOLIC BLOOD PRESSURE: 84 MMHG | BODY MASS INDEX: 38.8 KG/M2

## 2019-05-10 DIAGNOSIS — R73.02 IGT (IMPAIRED GLUCOSE TOLERANCE): ICD-10-CM

## 2019-05-10 DIAGNOSIS — Z79.899 LONG-TERM CURRENT USE OF HIGH RISK MEDICATION OTHER THAN ANTICOAGULANT: ICD-10-CM

## 2019-05-10 DIAGNOSIS — I10 ESSENTIAL HYPERTENSION: ICD-10-CM

## 2019-05-10 DIAGNOSIS — I42.9 CARDIOMYOPATHY, UNSPECIFIED TYPE (HCC): ICD-10-CM

## 2019-05-10 DIAGNOSIS — E78.5 HYPERLIPIDEMIA, UNSPECIFIED HYPERLIPIDEMIA TYPE: Primary | ICD-10-CM

## 2019-05-10 DIAGNOSIS — K76.0 FATTY LIVER: ICD-10-CM

## 2019-05-10 DIAGNOSIS — F33.9 RECURRENT DEPRESSION (HCC): ICD-10-CM

## 2019-05-10 LAB
A-G RATIO,AGRAT: 1.8 RATIO
ALBUMIN SERPL-MCNC: 4.8 G/DL (ref 3.9–5.4)
ALP SERPL-CCNC: 115 U/L (ref 38–126)
ALT SERPL-CCNC: 55 U/L (ref 9–52)
ANION GAP SERPL CALC-SCNC: 17 MMOL/L
AST SERPL W P-5'-P-CCNC: 32 U/L (ref 14–36)
BILIRUB SERPL-MCNC: 0.6 MG/DL (ref 0.2–1.3)
BUN SERPL-MCNC: 13 MG/DL (ref 9–20)
BUN/CREATININE RATIO,BUCR: 11 RATIO
CALCIUM SERPL-MCNC: 10 MG/DL (ref 8.4–10.2)
CHLORIDE SERPL-SCNC: 105 MMOL/L (ref 98–107)
CHOL/HDL RATIO,CHHD: 3 RATIO (ref 0–4)
CHOLEST SERPL-MCNC: 119 MG/DL (ref 0–200)
CK SERPL-CCNC: 62 U/L (ref 30–135)
CO2 SERPL-SCNC: 22 MMOL/L (ref 22–32)
CREAT SERPL-MCNC: 1.2 MG/DL (ref 0.8–1.5)
GLOBULIN,GLOB: 2.7
GLUCOSE SERPL-MCNC: 98 MG/DL (ref 75–110)
HDLC SERPL-MCNC: 35 MG/DL (ref 35–130)
LDL/HDL RATIO,LDHD: 1 RATIO
LDLC SERPL CALC-MCNC: 49 MG/DL (ref 0–130)
POTASSIUM SERPL-SCNC: 4.1 MMOL/L (ref 3.6–5)
PROT SERPL-MCNC: 7.5 G/DL (ref 6.3–8.2)
SODIUM SERPL-SCNC: 144 MMOL/L (ref 137–145)
TRIGL SERPL-MCNC: 176 MG/DL (ref 0–200)
VLDLC SERPL CALC-MCNC: 35 MG/DL

## 2019-05-10 NOTE — PROGRESS NOTES
Chief Complaint Patient presents with  Hypertension  Cholesterol Problem Depression Risk Factor Screening: No flowsheet data found. Functional Ability and Level of Safety: Activities of Daily Living ADL Assessment 2018 Feeding yourself No Help Needed Getting from bed to chair No Help Needed Getting dressed No Help Needed Bathing or showering No Help Needed Walk across the room (includes cane/walker) No Help Needed Using the telphone No Help Needed Taking your medications No Help Needed Preparing meals No Help Needed Managing money (expenses/bills) No Help Needed Moderately strenuous housework (laundry) No Help Needed Shopping for personal items (toiletries/medicines) No Help Needed Shopping for groceries No Help Needed Driving No Help Needed Climbing a flight of stairs No Help Needed Getting to places beyond walking distances No Help Needed Fall Risk Fall Risk Assessment, last 12 mths 2018 Able to walk? Yes Fall in past 12 months? Yes Fall with injury? Yes  
Number of falls in past 12 months 1 Fall Risk Score 2 Abuse Screen Abuse Screening Questionnaire 2018 Do you ever feel afraid of your partner? Rebeka Mosley Are you in a relationship with someone who physically or mentally threatens you? Rebeka Dimitri Is it safe for you to go home? Lin Perez Reviewed record in preparation for visit and have obtained necessary documentation. Identified pt with two pt identifiers(name and ). Chief Complaint Patient presents with  Hypertension  Cholesterol Problem Wt Readings from Last 3 Encounters:  
05/10/19 271 lb (122.9 kg) 19 289 lb 3.2 oz (131.2 kg) 18 285 lb 6.4 oz (129.5 kg) Temp Readings from Last 3 Encounters:  
05/10/19 97.9 °F (36.6 °C) (Oral) 19 98.5 °F (36.9 °C) (Oral) 18 98.1 °F (36.7 °C) (Oral) BP Readings from Last 3 Encounters:  
05/10/19 122/84  
19 118/84  
18 112/80 Pulse Readings from Last 3 Encounters:  
05/10/19 81  
02/08/19 81  
11/09/18 79 Coordination of Care Questionnaire: 
:  
 
1) Have you been to an emergency room, urgent care clinic since your last visit? Hospitalized since your last visit? When: 
Where: 
Diagnosis: 
     
 
2) Have you seen or consulted any other health care providers outside of [address] [place] [address] since your last visit? When: 
Where: 
Diagnosis: (Include any pap smears or colon screenings in this section.) Patient Care Team  
Patient Care Team: 
Tom Woodson MD as PCP - General (Internal Medicine) Oswaldo Shepard MD (Neurosurgery) Chief Complaint Patient presents with  Hypertension  Cholesterol Problem Depression Risk Factor Screening: No flowsheet data found. Functional Ability and Level of Safety: Activities of Daily Living ADL Assessment 1/11/2018 Feeding yourself No Help Needed Getting from bed to chair No Help Needed Getting dressed No Help Needed Bathing or showering No Help Needed Walk across the room (includes cane/walker) No Help Needed Using the telphone No Help Needed Taking your medications No Help Needed Preparing meals No Help Needed Managing money (expenses/bills) No Help Needed Moderately strenuous housework (laundry) No Help Needed Shopping for personal items (toiletries/medicines) No Help Needed Shopping for groceries No Help Needed Driving No Help Needed Climbing a flight of stairs No Help Needed Getting to places beyond walking distances No Help Needed Fall Risk Fall Risk Assessment, last 12 mths 1/11/2018 Able to walk? Yes Fall in past 12 months? Yes Fall with injury? Yes  
Number of falls in past 12 months 1 Fall Risk Score 2 Abuse Screen Abuse Screening Questionnaire 1/11/2018 Do you ever feel afraid of your partner? Rebeka Mosley Are you in a relationship with someone who physically or mentally threatens you?  Rebeka Mosley  
 Is it safe for you to go home? Nat Herr Reviewed record in preparation for visit and have obtained necessary documentation. Identified pt with two pt identifiers(name and ). Chief Complaint Patient presents with  Hypertension  Cholesterol Problem Wt Readings from Last 3 Encounters:  
05/10/19 271 lb (122.9 kg) 19 289 lb 3.2 oz (131.2 kg) 18 285 lb 6.4 oz (129.5 kg) Temp Readings from Last 3 Encounters:  
05/10/19 97.9 °F (36.6 °C) (Oral) 19 98.5 °F (36.9 °C) (Oral) 18 98.1 °F (36.7 °C) (Oral) BP Readings from Last 3 Encounters:  
05/10/19 122/84  
19 118/84  
18 112/80 Pulse Readings from Last 3 Encounters:  
05/10/19 81  
19 81  
18 79 Coordination of Care Questionnaire: 
:  
 
1) Have you been to an emergency room, urgent care clinic since your last visit? No  
 
Hospitalized since your last visit? No  
 
 
     
 
2) Have you seen or consulted any other health care providers outside of [address] [place] [address] since your last visit? No  
 
 
 
 
 
Patient Care Team  
Patient Care Team: 
Elvi Sanders MD as PCP - General (Internal Medicine) Suha Ames MD (Neurosurgery)

## 2019-05-10 NOTE — ACP (ADVANCE CARE PLANNING)
Discussed Advanced Care Directives. No information on file. [address] [place] Directive for [facility] for given to patient.

## 2019-05-10 NOTE — PROGRESS NOTES
Blood sugar, liver and kidney function normal.  CK (muscle enzyme) normal.  Lipids excellent. LDL 49  Total cholesterol 119. HDL better also at 35. No change in medications or treatment.

## 2019-05-10 NOTE — PROGRESS NOTES
Subjective:  
Sekou Azul is a 44 y.o. male Chief Complaint Patient presents with  Hypertension  Cholesterol Problem History of present illness:  Dawalt Handler returns in follow up. He has finally accomplished a significant amount of weight loss on the keto diet. He has lost 18 pounds since his last visit here. He is feeling well. He is not noting shortness of breath. He denies any chest pain. He is exercising sometimes, but not as much as he could. This would help his weight loss further. He returns in follow up regarding his hypertension, which is great today. He has a very mild cardiomyopathy and will need to have his echo checked at probably the two year interval, which will be January of next year. He is not symptomatic at the present time. We will follow up on his lipids as well. He has had issues with impaired glucose, but I suspect this is significantly better now and we will merely check blood sugar today as his A1c last visit was quite good. He denies other new issues today. He should follow up in six months' time. Patient Active Problem List  
 Diagnosis Date Noted  Cardiomyopathy ([facility] 01/26/2018 Priority: 1 - One  
 Essential hypertension 08/03/2017 Priority: 1 - One  
 Hyperlipidemia 08/02/2017 Priority: 1 - One  Recurrent depression ([facility] 01/11/2018 Priority: 2 - Two  Cervical radiculopathy 11/07/2017 Priority: 2 - Two  
 TYRESE on CPAP 08/02/2017 Priority: 2 - Two  Knee pain, left 08/02/2017 Priority: 2 - Two  
 IGT (impaired glucose tolerance) 08/02/2017 Priority: 2 - Two  Depression 08/02/2017 Priority: 2 - Two  Fatty liver 08/02/2017 Priority: 2 - Two  Allergic rhinitis 08/02/2017 Priority: 3 - Three  Plantar fasciitis 08/02/2017 Priority: 3 - Three  BMI 40.0-44.9, adult ([facility] 08/02/2017 Priority: 3 - Three  Asthma 08/02/2017 Priority: 3 - Three  Complex tear of medial meniscus of right knee as current injury 07/27/2017 Priority: 3 - Three  Osteoarthrosis, localized, primary, knee 07/27/2017 Priority: 3 - Three  Long-term current use of high risk medication other than anticoagulant 05/16/2018 Priority: 4 - Four  Deviated nasal septum 08/02/2017 Priority: 4 - Four  Vitamin D deficiency 08/02/2017 Priority: 4 - Four  Metatarsalgia 08/02/2017 Priority: 4 - Four  Insomnia 08/02/2017 Priority: 4 - Four  PE (physical exam), annual 12/10/2017 Past Surgical History:  
Procedure Laterality Date  HX KNEE ARTHROSCOPY Left   
 med menisectomy [facility]  1999 2006  
 bilateral shoulder decompressions  HX REFRACTIVE SURGERY Bilateral 2006 Allergies Allergen Reactions  Lisinopril Unknown (comments) Family History Problem Relation Age of Onset  High Cholesterol Mother  Cancer Father   
     lung  Other Father   
     lymphoma Social History Socioeconomic History  Marital status: SINGLE Spouse name: Not on file  Number of children: Not on file  Years of education: Not on file  Highest education level: Not on file Occupational History  Not on file Social Needs  Financial resource strain: Not on file  Food insecurity:  
  Worry: Not on file Inability: Not on file  Transportation needs:  
  Medical: Not on file Non-medical: Not on file Tobacco Use  Smoking status: Never Smoker  Smokeless tobacco: Never Used Substance and Sexual Activity  Alcohol use: No  
 Drug use: Not on file  Sexual activity: Not on file Lifestyle  Physical activity:  
  Days per week: Not on file Minutes per session: Not on file  Stress: Not on file Relationships  Social connections:  
  Talks on phone: Not on file Gets together: Not on file Attends Bahai service: Not on file Active member of club or organization: Not on file Attends meetings of clubs or organizations: Not on file Relationship status: Not on file  Intimate partner violence:  
  Fear of current or ex partner: Not on file Emotionally abused: Not on file Physically abused: Not on file Forced sexual activity: Not on file Other Topics Concern  Not on file Social History Narrative  Not on file Review of Systems Constitutional:  He denies fever, weight loss, sweats or fatigue. HEENT:  No blurred or double vision, headache or dizziness. No difficulty with swallowing, taste, speech or smell. Respiratory:  No cough, wheezing or shortness of breath. No sputum production. Cardiac:  Denies chest pain, palpitations, unexplained indigestion, syncope, edema, PND or orthopnea. GI:  No changes in bowel movements, no abdominal pain, no bloating, anorexia, nausea, vomiting or heartburn. :  No frequency or dysuria. Denies incontinence. Extremities:  No joint pain, stiffness or swelling. Skin:  No recent rashes or mole changes. Neurological:  No numbness, tingling, burning paresthesias or loss of motor strength. No syncope, dizziness, frequent headaches or memory loss. Objective:  
 
Vitals:  
 05/10/19 8483 BP: 122/84 Pulse: 81 Temp: 97.9 °F (36.6 °C) TempSrc: Oral  
Weight: 271 lb (122.9 kg) Height: 5' 10\" (1.778 m) PainSc:   0 - No pain Body mass index is 38.88 kg/m². Physical Examination:  
           General Appearance:  Well-developed, well-nourished, no acute  distress. HEENT:   
 Ears:  The TMs and ear canals were clear. Eyes:  The pupillary responses were normal.  Extraocular muscle function intact. Lids and conjunctiva not injected. Neck:  Supple without thyromegaly or adenopathy. No JVD noted. Lungs:  Clear to auscultation and percussion. Cardiac:  Regular rate and rhythm without murmur. GI: nontender w/o mass. Normal BS's. Extremities:  No clubbing, cyanosis or edema. Skin:  No rash or unusual mole changes noted. Neurological:  Grossly normal.     
 
 
 
Assessment/Plan:  
Impressions: ICD-10-CM ICD-9-CM 1. Hyperlipidemia, unspecified hyperlipidemia type Z83.0 288.9 METABOLIC PANEL, COMPREHENSIVE  
   LIPID PANEL 2. Essential hypertension J22 360.5 METABOLIC PANEL, COMPREHENSIVE 3. Cardiomyopathy, unspecified type ([facility] C98.8 651.8 METABOLIC PANEL, COMPREHENSIVE 4. IGT (impaired glucose tolerance) C70.17 729.25 METABOLIC PANEL, COMPREHENSIVE 5. Fatty liver K05.0 436.3 METABOLIC PANEL, COMPREHENSIVE 6. Recurrent depression (HCC) F33.9 296.30   
7. Long-term current use of high risk medication other than anticoagulant M43.993 Q02.70 METABOLIC PANEL, COMPREHENSIVE  
   CK Plan: 1. Continue present meds 2. Discussed lifestyle modifications including Na restriction, low carb/fat diet, weight reduction and exercise (at least a walking program). Follow-up and Dispositions · Return in about 6 months (around 11/10/2019) for CPE. Orders Placed This Encounter  METABOLIC PANEL, COMPREHENSIVE ([facility])  LIPID PANEL ([facility])  CK ([facility]) Francois Richey MD

## 2019-05-13 DIAGNOSIS — F41.9 ANXIETY: ICD-10-CM

## 2019-05-14 ENCOUNTER — DOCUMENTATION ONLY (OUTPATIENT)
Dept: INTERNAL MEDICINE CLINIC | Age: 49
End: 2019-05-14

## 2019-05-14 RX ORDER — CLONAZEPAM 0.5 MG/1
TABLET ORAL
Qty: 180 TAB | Refills: 0 | Status: SHIPPED | OUTPATIENT
Start: 2019-05-14 | End: 2019-09-19 | Stop reason: SDUPTHER

## 2019-07-06 NOTE — PROGRESS NOTES
Mr. Shy Brooks is a 50year old white single male who comes to the office today for annual comprehensive personal healthcare examination. History of Present Illness: This patient has multiple medical problems. These include:  1. Dyslipidemia. 2. TYRESE, on CPAP at 5.  3. Hypertension. 4. Fatty liver. 5. Vitamin D deficiency. 6. Depression. 7. Seasonal allergies and episodic asthma, currently on allergy shots. 8. History of metatarsalgia and plantar fasciitis with previous right ankle injury. This is improved with better shoe wear. 9. Deviated nasal septum. 10. History of insomnia. 11. History of impaired glucose intolerance. 12. Sebaceous cyst, right neck, status post excision. 13. Obesity with BMI greater than 43.  14. Bilateral medial meniscal tear, status post arthroscopic repair on the left. This improved the knee pain. He is still having issues with his right knee pain, which limits his walking. 15. Unspecified eating disorder with poor ability to lose weight. 16. Cervical spondylosis and radiculopathy with left arm pain and numbness at one point. Physical therapy has improved this significantly. MRI of the cervical spine revealed C6-7 disc and left foraminal stenosis. Past Medical History:  Otherwise remarkable for LASIK surgery and bilateral shoulder decompression, as well as the left knee arthroscopic medial meniscectomy. Allergies:  Lisinopril causes a cough. Current Medications:  1. Aspirin 81 mg daily. 2. Vitamin D3 2,000 I U daily. 3. Bupropion  mg daily. 4. Multivitamin daily. 5. Albuterol MDI as needed. 6. AYR gel as needed. 7. Klonopin 0.5 mg, one to two at bedtime. 8. Losartan 100 mg daily. 9. Amlodipine 5 mg daily. 10. Crestor 5 mg daily. 11. Allergy shots. 12. Meloxicam 15 mg daily. 13. HCTZ 25 mg daily. 14. Trial of Topamax 25 mg at bedtime for two weeks, then b.i.d., to help with his eating disorder.     Social History:  He is a former , now working for [employer]. He does not smoke. He drinks alcohol occasionally. His diet has been a bit more prudent recently and he has lost some weight. He still needs more exercise. Family History:  Father had a lymphoma and lung cancer and  at age 76. Mother has hyperlipidemia and is in her 76s. He has no siblings. Review of Systems:  CONSTITUTIONAL:  He denies fever, weight loss, sweats or fatigue. HEENT:  No blurred or double vision, headache or dizziness. No difficulty with swallowing, taste, speech or smell. RESPIRATORY:  No cough, wheezing or shortness of breath. No sputum production. CARDIAC:  Denies chest pain, palpitations, unexplained indigestion, syncope, edema, PND or orthopnea. GI:  No changes in bowel movements, no abdominal pain, no bloating, anorexia, nausea, vomiting or heartburn. :  He denies frequency, nocturia, stranguria, dysuria or sexual dysfunction. EXTREMITIES:  Right greater than left knee pain. SKIN:  No recent rashes or mole changes. NEUROLOGICAL:  No numbness, tingling, burning paresthesias or loss of motor strength. No syncope, dizziness, frequent headaches or memory loss. Physical Examination:  GENERAL:  Well-developed, well-nourished, no acute distress. VITAL SIGNS:  BP: 128/84. P: 80 and regular. R: 14.  T: 98.7. HT: 5'10\". WT: 301 lbs. BMI: 43.2. VISION:  Deferred to ophthalmologist.  HEARING:  Essentially normal.  HEENT:  Ears:  The TMs and ear canals were clear. Eyes:  The pupillary responses were normal.  Extraocular muscle function intact. Lids and conjunctivae not injected. Fundoscopic exam revealed sharp disc margins. Pharynx:  Clear with teeth in good repair. No masses were noted. NECK:  Supple without thyromegaly or adenopathy. No JVD noted. No carotid bruits. LUNGS:  Clear to auscultation and percussion. CARDIAC:  Regular rate and rhythm without murmur. PMI not displaced. No gallop, rub or click.   ABDOMEN:  Obese, soft, non-tender without palpable organomegaly or mass. No pulsatile mass was felt and no bruit was heard. Bowel sounds were active. :  Circumcised male. Both testes descended and no masses felt. RECTAL EXAM:  Deferred. EXTREMITIES:  No clubbing, cyanosis or edema. PULSES:  Dorsalis pedis and posterior tibial pulses felt without difficulty. SKIN:  No rash or unusual mole changes noted. LYMPH NODES:  None felt in the cervical, supraclavicular, axillary or inguinal region. NEUROLOGICAL:  Cranial nerves II-XII grossly intact. Motor strength 5/5. DTRs 2+ and symmetric. Station and gait normal.    Laboratory:  Hemoglobin is 16.3. White blood count is 6,300. Blood sugar is 108. A1c 6.1%. AST 37, ALT 73. Kidney function normal.  Electrolytes normal.  Iron 74. PSA 0.3. Vitamin D 45. TSH 2.09.  UA clear. Lipid profile reveals a total cholesterol of 98, triglycerides of 96, HDL cholesterol of 36 and an LDL of 43. Health Maintenance Issues and Ancillary Studies:  1. TDAP (pertussis and tetanus) vaccine was given in October, 2013.  2. Seasonal influenza vaccine was given on the day of the visit. 3. EBT heart scan revealed a calcium score of 37.6 in October, 2016.  4. Cervical spine MRI revealed a C6-7 HNP with left foraminal stenosis in August, 2017. 5. MRI of the right knee in June, 2017 revealed a medial meniscal tear. 6. MRI of the left knee in July, 2016 revealed a left medial meniscal tear. 7. EKG in the office is normal.  8. Pulmonary function studies are normal.  9. Health screening assessments are essentially normal.    Impression:  1. Hypertension, adequate control. 2. Dyslipidemia. 3. Impaired glucose tolerance. 4. TYRESE, on CPAP. 5. History of fatty liver. 6. Vitamin D deficiency. 7. Mild depression. 8. Obesity with BMI greater than 43.  9. Seasonal allergies and episodic asthma, currently receiving allergy shots.   10. History of metatarsalgia and plantar fasciitis, resolved. 11. Deviated nasal septum. 12. Sebaceous cyst right side of neck, status post excision. 13. Left knee medial meniscal tear, status post arthroscopic surgery. 14. Right knee medial meniscal tear. 15. Status post other surgeries. 16. Unspecified eating disorder with inability to lose weight. Plan:  1. Continue present medications with the addition of Topamax. 2. Go back to Dr. Rigo Chavez regarding the right knee and see if it is time to proceed with a medial meniscal repair. 3. Continue low carb diet. 4. Once he gets his knees fixed will try to improve his exercise program.  5. Recheck here one month's time regarding his response to Topamax. 6. He is appropriately treated for his ten year coronary heart disease risk. 2 = assistive person

## 2019-07-26 RX ORDER — LOSARTAN POTASSIUM 100 MG/1
TABLET ORAL
Qty: 90 TAB | Refills: 3 | Status: SHIPPED | OUTPATIENT
Start: 2019-07-26 | End: 2020-11-20 | Stop reason: SDUPTHER

## 2019-07-26 RX ORDER — AMLODIPINE BESYLATE 5 MG/1
TABLET ORAL
Qty: 90 TAB | Refills: 3 | Status: SHIPPED | OUTPATIENT
Start: 2019-07-26 | End: 2020-09-14 | Stop reason: SDUPTHER

## 2019-07-26 NOTE — TELEPHONE ENCOUNTER
PCP: Vanessa Moise MD    Last appt: 5/10/2019  Future Appointments   Date Time Provider Anant Vaca   11/12/2019  8:00 AM Donna Nieves MD [address]       Requested Prescriptions     Pending Prescriptions Disp Refills    amLODIPine (NORVASC) 5 mg tablet [Pharmacy Med Name: AMLODIPINE BESYLATE 5 MG TAB] 90 Tab 3     Sig: TAKE 1 TABLET BY MOUTH EVERY DAY    losartan (COZAAR) 100 mg tablet [Pharmacy Med Name: LOSARTAN POTASSIUM 100 MG TAB] 90 Tab 3     Sig: TAKE 1 TABLET BY MOUTH EVERY DAY       Prior labs and Blood pressures:  BP Readings from Last 3 Encounters:   05/10/19 122/84   02/08/19 118/84   11/09/18 112/80     Lab Results   Component Value Date/Time    Sodium 144 05/10/2019 08:34 AM    Potassium 4.1 05/10/2019 08:34 AM    Chloride 105 05/10/2019 08:34 AM    CO2 22.0 05/10/2019 08:34 AM    Anion gap 17 05/10/2019 08:34 AM    Glucose 98 05/10/2019 08:34 AM    BUN 13.0 05/10/2019 08:34 AM    Creatinine 1.2 05/10/2019 08:34 AM    BUN/Creatinine ratio 11 05/10/2019 08:34 AM    GFR est AA >60 05/10/2019 08:34 AM    GFR est non-AA >60 05/10/2019 08:34 AM    Calcium 10.0 05/10/2019 08:34 AM     Lab Results   Component Value Date/Time    Hemoglobin A1c 5.5 02/08/2019 08:21 AM    Hemoglobin A1c (POC) 5.7 11/01/2018 08:50 AM     Lab Results   Component Value Date/Time    Cholesterol, total 119 05/10/2019 08:34 AM    Cholesterol (POC) 97.0 11/01/2018 08:50 AM    HDL Cholesterol 35 05/10/2019 08:34 AM    HDL Cholesterol (POC) 38.0 11/01/2018 08:50 AM    LDL Cholesterol (POC) 36.0 11/01/2018 08:50 AM    LDL, calculated 49 05/10/2019 08:34 AM    VLDL 35 05/10/2019 08:34 AM    Triglyceride 176 05/10/2019 08:34 AM    Triglycerides (POC) 115.0 11/01/2018 08:50 AM    CHOL/HDL Ratio 3 05/10/2019 08:34 AM     No results found for: Jai Alanis, XQVID3, XQVID, VD3RIA    No results found for: TSH, TSH2, TSH3, TSHP, TSHEXT

## 2019-09-19 DIAGNOSIS — F41.9 ANXIETY: ICD-10-CM

## 2019-09-19 RX ORDER — LOSARTAN POTASSIUM 100 MG/1
TABLET ORAL
Qty: 90 TAB | Refills: 3 | Status: CANCELLED | OUTPATIENT
Start: 2019-09-19

## 2019-09-19 RX ORDER — CLONAZEPAM 0.5 MG/1
TABLET ORAL
Qty: 180 TAB | Refills: 0 | Status: SHIPPED | OUTPATIENT
Start: 2019-09-19 | End: 2019-12-23

## 2019-09-19 NOTE — TELEPHONE ENCOUNTER
Last filled: 5/14/19    PCP: Mary Rabago MD    Last appt: 5/10/2019  Future Appointments   Date Time Provider Anant Vaca   11/12/2019  8:00 AM Kelly Nieves MD [facility]EL[facility]       Requested Prescriptions     Pending Prescriptions Disp Refills    clonazePAM (KLONOPIN) 0.5 mg tablet 180 Tab 0       Prior labs and Blood pressures:  BP Readings from Last 3 Encounters:   05/10/19 122/84   02/08/19 118/84   11/09/18 112/80     Lab Results   Component Value Date/Time    Sodium 144 05/10/2019 08:34 AM    Potassium 4.1 05/10/2019 08:34 AM    Chloride 105 05/10/2019 08:34 AM    CO2 22.0 05/10/2019 08:34 AM    Anion gap 17 05/10/2019 08:34 AM    Glucose 98 05/10/2019 08:34 AM    BUN 13.0 05/10/2019 08:34 AM    Creatinine 1.2 05/10/2019 08:34 AM    BUN/Creatinine ratio 11 05/10/2019 08:34 AM    GFR est AA >60 05/10/2019 08:34 AM    GFR est non-AA >60 05/10/2019 08:34 AM    Calcium 10.0 05/10/2019 08:34 AM     Lab Results   Component Value Date/Time    Hemoglobin A1c 5.5 02/08/2019 08:21 AM    Hemoglobin A1c (POC) 5.7 11/01/2018 08:50 AM     Lab Results   Component Value Date/Time    Cholesterol, total 119 05/10/2019 08:34 AM    Cholesterol (POC) 97.0 11/01/2018 08:50 AM    HDL Cholesterol 35 05/10/2019 08:34 AM    HDL Cholesterol (POC) 38.0 11/01/2018 08:50 AM    LDL Cholesterol (POC) 36.0 11/01/2018 08:50 AM    LDL, calculated 49 05/10/2019 08:34 AM    VLDL 35 05/10/2019 08:34 AM    Triglyceride 176 05/10/2019 08:34 AM    Triglycerides (POC) 115.0 11/01/2018 08:50 AM    CHOL/HDL Ratio 3 05/10/2019 08:34 AM     No results found for: Randol Christina, XQVID3, XQVID, VD3RIA    No results found for: TSH, TSH2, TSH3, TSHP, TSHEXT

## 2019-12-02 RX ORDER — BUMETANIDE 0.5 MG/1
TABLET ORAL
Qty: 90 TAB | Refills: 0 | Status: SHIPPED | OUTPATIENT
Start: 2019-12-02 | End: 2020-12-14

## 2019-12-02 RX ORDER — BUPROPION HYDROCHLORIDE 300 MG/1
TABLET ORAL
Qty: 90 TAB | Refills: 0 | Status: SHIPPED | OUTPATIENT
Start: 2019-12-02 | End: 2020-03-16 | Stop reason: SDUPTHER

## 2019-12-02 NOTE — TELEPHONE ENCOUNTER
PCP: Sia Pichardo MD    Last appt: 5/10/2019  No future appointments.     Requested Prescriptions     Pending Prescriptions Disp Refills    bumetanide (BUMEX) 0.5 mg tablet 90 Tab 1     Sig: TAKE 1 TABLET BY MOUTH EVERY DAY    buPROPion XL (WELLBUTRIN XL) 300 mg XL tablet 90 Tab 1     Sig: TAKE 1 TABLET BY MOUTH DAILY

## 2019-12-22 DIAGNOSIS — F41.9 ANXIETY: ICD-10-CM

## 2019-12-23 RX ORDER — CLONAZEPAM 0.5 MG/1
TABLET ORAL
Qty: 180 TAB | Refills: 0 | Status: SHIPPED | OUTPATIENT
Start: 2019-12-23 | End: 2020-03-23 | Stop reason: SDUPTHER

## 2020-03-16 ENCOUNTER — OFFICE VISIT (OUTPATIENT)
Dept: INTERNAL MEDICINE CLINIC | Age: 50
End: 2020-03-16

## 2020-03-16 VITALS
RESPIRATION RATE: 14 BRPM | HEIGHT: 70 IN | BODY MASS INDEX: 42.23 KG/M2 | HEART RATE: 78 BPM | WEIGHT: 295 LBS | TEMPERATURE: 98.4 F | SYSTOLIC BLOOD PRESSURE: 120 MMHG | DIASTOLIC BLOOD PRESSURE: 82 MMHG | OXYGEN SATURATION: 96 %

## 2020-03-16 DIAGNOSIS — R73.02 IGT (IMPAIRED GLUCOSE TOLERANCE): ICD-10-CM

## 2020-03-16 DIAGNOSIS — Z99.89 OSA ON CPAP: ICD-10-CM

## 2020-03-16 DIAGNOSIS — K76.0 FATTY LIVER: ICD-10-CM

## 2020-03-16 DIAGNOSIS — G47.33 OSA ON CPAP: ICD-10-CM

## 2020-03-16 DIAGNOSIS — E78.5 HYPERLIPIDEMIA, UNSPECIFIED HYPERLIPIDEMIA TYPE: ICD-10-CM

## 2020-03-16 DIAGNOSIS — I10 ESSENTIAL HYPERTENSION: Primary | ICD-10-CM

## 2020-03-16 DIAGNOSIS — I42.9 CARDIOMYOPATHY, UNSPECIFIED TYPE (HCC): ICD-10-CM

## 2020-03-16 LAB
A-G RATIO,AGRAT: 1.6 RATIO
ALBUMIN SERPL-MCNC: 4.4 G/DL (ref 3.9–5.4)
ALP SERPL-CCNC: 116 U/L (ref 38–126)
ALT SERPL-CCNC: 45 U/L (ref 0–50)
ANION GAP SERPL CALC-SCNC: 14 MMOL/L
AST SERPL W P-5'-P-CCNC: 34 U/L (ref 14–36)
BILIRUB SERPL-MCNC: 0.8 MG/DL (ref 0.2–1.3)
BUN SERPL-MCNC: 15 MG/DL (ref 9–20)
BUN/CREATININE RATIO,BUCR: 14 RATIO
CALCIUM SERPL-MCNC: 9.5 MG/DL (ref 8.4–10.2)
CHLORIDE SERPL-SCNC: 104 MMOL/L (ref 98–107)
CHOL/HDL RATIO,CHHD: 3 RATIO (ref 0–4)
CHOLEST SERPL-MCNC: 105 MG/DL (ref 0–200)
CO2 SERPL-SCNC: 26 MMOL/L (ref 22–32)
CREAT SERPL-MCNC: 1.1 MG/DL (ref 0.8–1.5)
GLOBULIN,GLOB: 2.7
GLUCOSE SERPL-MCNC: 96 MG/DL (ref 75–110)
HDLC SERPL-MCNC: 35 MG/DL (ref 35–130)
LDL/HDL RATIO,LDHD: 1 RATIO
LDLC SERPL CALC-MCNC: 48 MG/DL (ref 0–130)
POTASSIUM SERPL-SCNC: 4.5 MMOL/L (ref 3.6–5)
PROT SERPL-MCNC: 7.1 G/DL (ref 6.3–8.2)
SODIUM SERPL-SCNC: 144 MMOL/L (ref 137–145)
TRIGL SERPL-MCNC: 112 MG/DL (ref 0–200)
VLDLC SERPL CALC-MCNC: 22 MG/DL

## 2020-03-16 RX ORDER — BUPROPION HYDROCHLORIDE 300 MG/1
TABLET ORAL
Qty: 90 TAB | Refills: 3 | Status: SHIPPED | OUTPATIENT
Start: 2020-03-16 | End: 2021-03-30

## 2020-03-16 NOTE — PATIENT INSTRUCTIONS
Starting a Weight Loss Plan: Care Instructions Your Care Instructions If you are thinking about losing weight, it can be hard to know where to start. Your doctor can help you set up a weight loss plan that best meets your needs. You may want to take a class on nutrition or exercise, or join a weight loss support group. If you have questions about how to make changes to your eating or exercise habits, ask your doctor about seeing a registered dietitian or an exercise specialist. 
It can be a big challenge to lose weight. But you do not have to make huge changes at once. Make small changes, and stick with them. When those changes become habit, add a few more changes. If you do not think you are ready to make changes right now, try to pick a date in the future. Make an appointment to see your doctor to discuss whether the time is right for you to start a plan. Follow-up care is a key part of your treatment and safety. Be sure to make and go to all appointments, and call your doctor if you are having problems. It's also a good idea to know your test results and keep a list of the medicines you take. How can you care for yourself at home? · Set realistic goals. Many people expect to lose much more weight than is likely. A weight loss of 5% to 10% of your body weight may be enough to improve your health. · Get family and friends involved to provide support. Talk to them about why you are trying to lose weight, and ask them to help. They can help by participating in exercise and having meals with you, even if they may be eating something different. · Find what works best for you. If you do not have time or do not like to cook, a program that offers meal replacement bars or shakes may be better for you. Or if you like to prepare meals, finding a plan that includes daily menus and recipes may be best. 
· Ask your doctor about other health professionals who can help you achieve your weight loss goals. ? A dietitian can help you make healthy changes in your diet. ? An exercise specialist or  can help you develop a safe and effective exercise program. 
? A counselor or psychiatrist can help you cope with issues such as depression, anxiety, or family problems that can make it hard to focus on weight loss. · Consider joining a support group for people who are trying to lose weight. Your doctor can suggest groups in your area. Where can you learn more? Go to http://hussein-conrad.info/ Enter W928 in the search box to learn more about \"Starting a Weight Loss Plan: Care Instructions. \" Current as of: December 10, 2019Content Version: 12.4 © 4339-3795 [employer]. Care instructions adapted under license by [employer] (which disclaims liability or warranty for this information). If you have questions about a medical condition or this instruction, always ask your healthcare professional. [address] any warranty or liability for your use of this information.

## 2020-03-16 NOTE — PROGRESS NOTES
This note will not be viewable in [address] Amaya Roldan is a 44 y.o. male and presents with Complete Physical      Subjective:  Viviana Rosenberg is here to establish care with me. He is a former patient of Dr. Renetta Nevarez. He prefers to be called in [facility]. He is unmarried and works for the [employer]. Lives in [place]. He has a history of hypertension, hyperlipidemia, prediabetes and obstructive sleep apnea. His blood pressure is well controlled on losartan and Norvasc. He denies headaches or blurred vision. He has mild cardiomyopathy the with grade 1 diastolic dysfunction and EF around 55 to 60%. Reviewed 2D echo from 2018. He has intermittent mild shortness of breath and the Bumex helps with that. Hyperlipidemia on statin. Denies muscle cramps or myalgias. History of obstructive sleep apnea on CPAP. He currently uses setting 7.5. He denies morning headaches or napping during the day. Denies fatigue. He has gained around 15 pounds from his last visit and was concerned about it. He does admit not working out on following a diet. He reports he lost some weight last year after going through the keto diet and is thinking of going back on it.       Past Medical History:   Diagnosis Date    Arthritis     Asthma 8/2/2017    BMI 40.0-44.9, adult ([address] 8/2/2017    Depression 8/2/2017    Deviated nasal septum 8/2/2017    Essential hypertension 8/3/2017    Fatty liver 8/2/2017    Hyperlipidemia 8/2/2017    Hypertension     IGT (impaired glucose tolerance) 8/2/2017    Insomnia 8/2/2017    Knee pain, left 8/2/2017    Metatarsalgia 8/2/2017    TYRESE on CPAP 8/2/2017    Plantar fasciitis 8/2/2017    Psychiatric disorder     anxiety and depression    Sleep apnea     cpap    Vitamin D deficiency 8/2/2017     Past Surgical History:   Procedure Laterality Date    HX KNEE ARTHROSCOPY Left     med menisectomy    HX ORTHOPAEDIC  1999 2006    bilateral shoulder decompressions    HX REFRACTIVE SURGERY Bilateral 2006     Allergies   Allergen Reactions    Lisinopril Unknown (comments)     Current Outpatient Medications   Medication Sig Dispense Refill    buPROPion XL (WELLBUTRIN XL) 300 mg XL tablet TAKE 1 TABLET BY MOUTH DAILY 90 Tab 3    clonazePAM (KLONOPIN) 0.5 mg tablet TAKE 2 TABLETS BY MOUTH EVERY DAY AT BEDTIME AS NEEDED 180 Tab 0    bumetanide (BUMEX) 0.5 mg tablet TAKE 1 TABLET BY MOUTH EVERY DAY 90 Tab 0    amLODIPine (NORVASC) 5 mg tablet TAKE 1 TABLET BY MOUTH EVERY DAY 90 Tab 3    losartan (COZAAR) 100 mg tablet TAKE 1 TABLET BY MOUTH EVERY DAY 90 Tab 3    atorvastatin (LIPITOR) 10 mg tablet Take 1 Tab by mouth daily. 90 Tab 3    Cetirizine (ZYRTEC) 10 mg cap Take  by mouth daily as needed.  albuterol (PROVENTIL HFA, VENTOLIN HFA, PROAIR HFA) 90 mcg/actuation inhaler Take 2 Puffs by inhalation every four (4) hours as needed for Wheezing.  cholecalciferol, vitamin D3, (VITAMIN D3) 2,000 unit tab Take 2,000 Units by mouth daily.  multivitamin (ONE A DAY) tablet Take 1 Tab by mouth daily.  sodium chloride-aloe vera (AYR SALINE) topical gel Apply  to affected area once.  aspirin delayed-release 81 mg tablet Take 81 mg by mouth daily.  OTHER Indications: ALLERGY SHOTS       Social History     Socioeconomic History    Marital status: SINGLE     Spouse name: Not on file    Number of children: Not on file    Years of education: Not on file    Highest education level: Not on file   Tobacco Use    Smoking status: Never Smoker    Smokeless tobacco: Never Used   Substance and Sexual Activity    Alcohol use: No     Family History   Problem Relation Age of Onset    High Cholesterol Mother     Cancer Father         lung    Other Father         lymphoma       Review of Systems  ROS is unremarkable except for ones highlighted in bold.    Constitutional: negative for fevers, chills, anorexia and weight loss  Eyes:   negative for visual disturbance and irritation  ENT:   negative for tinnitus,sore throat,nasal congestion,ear pain,hoarseness  Respiratory:  negative for cough, hemoptysis, dyspnea,wheezing  CV:   negative for chest pain, palpitations, lower extremity edema  GI:   negative for nausea, vomiting, diarrhea, abdominal pain,melena  Endo:               negative for polyuria,polydipsia,polyphagia,heat intolerance  Genitourinary: negative for frequency, dysuria and hematuria  Integumentary: negative for rash and pruritus  Hematologic:  negative for easy bruising and gum/nose bleeding  Musculoskel: negative for myalgias, arthralgias, back pain, muscle weakness, joint pain  Neurological:  negative for headaches, dizziness, vertigo, memory problems and gait   Behavl/Psych: negative for feelings of anxiety, depression, mood changes  ROS otherwise negative     Objective:  Visit Vitals  /82 (BP 1 Location: Left arm, BP Patient Position: Sitting)   Pulse 78   Temp 98.4 °F (36.9 °C) (Oral)   Resp 14   Ht 5' 10\" (1.778 m)   Wt 295 lb (133.8 kg)   SpO2 96%   BMI 42.33 kg/m²     Physical Exam:   General appearance - alert, well appearing, and in no distress  Mental status - alert, oriented to person, place, and time  EYE-RAJWINDER, EOMI, fundi normal, corneas normal, no foreign bodies  ENT-ENT exam normal, no neck nodes or sinus tenderness  Nose - normal and patent, no erythema, discharge or polyps  Mouth - mucous membranes moist, pharynx normal without lesions  Neck - supple, no significant adenopathy   Chest - clear to auscultation, no wheezes, rales or rhonchi, symmetric air entry   Heart - normal rate, regular rhythm, normal S1, S2, no murmurs, rubs, clicks or gallops   Abdomen - soft, nontender, nondistended, no masses or organomegaly  Lymph- no adenopathy palpable  Ext-peripheral pulses normal, no pedal edema, no clubbing or cyanosis  Skin-Warm and dry.  no hyperpigmentation, vitiligo, or suspicious lesions  Neuro -alert, oriented, normal speech, no focal findings or movement disorder noted  Musculoskeletal- FROM, no bony abnormalities, no point tenderness    Lab Review:  Results for orders placed or performed in visit on 13/54/80   METABOLIC PANEL, COMPREHENSIVE   Result Value Ref Range    Glucose 98 75 - 110 mg/dL    BUN 13.0 9.0 - 20.0 mg/dL    Creatinine 1.2 0.8 - 1.5 mg/dL    Sodium 144 137 - 145 mmol/L    Potassium 4.1 3.6 - 5.0 mmol/L    Chloride 105 98 - 107 mmol/L    CO2 22.0 22.0 - 32.0 mmol/L    Calcium 10.0 8.4 - 10.2 mg/dl    Protein, total 7.5 6.3 - 8.2 g/dL    Albumin 4.8 3.9 - 5.4 g/dL    AST (SGOT) 32.0 14.0 - 36.0 U/L    ALT (SGPT) 55 (H) 9 - 52 U/L    Alk. phosphatase 115 38 - 126 U/L    Bilirubin, total 0.6 0.2 - 1.3 mg/dL    BUN/Creatinine ratio 11 Ratio    GFR est AA >60 mL/min/1.73m2    GFR est non-AA >60 mL/min/1.73m2    Globulin 2.70     A-G Ratio 1.8 Ratio    Anion gap 17 mmol/L   LIPID PANEL   Result Value Ref Range    Cholesterol, total 119 0 - 200 mg/dL    Triglyceride 176 0 - 200 mg/dL    HDL Cholesterol 35 35 - 130 mg/dL    VLDL 35 mg/dL    LDL, calculated 49 0 - 130 mg/dL    CHOL/HDL Ratio 3 0 - 4 Ratio    LDL/HDL Ratio 1 Ratio   CK   Result Value Ref Range    CK 62.00 30.00 - 135.00 U/L        Documenation Review:    Assessment/Plan:    Diagnoses and all orders for this visit:    1. Essential hypertension  -     CBC W/O DIFF  -     METABOLIC PANEL, COMPREHENSIVE    2. Cardiomyopathy, unspecified type ([address]    3. IGT (impaired glucose tolerance)  -     HEMOGLOBIN A1C W/O EAG    4. Hyperlipidemia, unspecified hyperlipidemia type  -     LIPID PANEL    5. Fatty liver    6. TYRESE on CPAP    Other orders  -     buPROPion XL (WELLBUTRIN XL) 300 mg XL tablet; TAKE 1 TABLET BY MOUTH DAILY      Continue current meds  I will call with lab results and make further recommendations or adjustments if necessary.   Discussed lifestyle modifications including Na restriction, low carb/fat diet, weight reduction and exercise (at least a walking program). ICD-10-CM ICD-9-CM    1. Essential hypertension I10 401.9 CBC W/O DIFF      METABOLIC PANEL, COMPREHENSIVE   2. Cardiomyopathy, unspecified type ([facility] [address] I42.9 425.4    3. IGT (impaired glucose tolerance) R73.02 790.22 HEMOGLOBIN A1C W/O EAG   4. Hyperlipidemia, unspecified hyperlipidemia type E78.5 272.4 LIPID PANEL   5. Fatty liver K76.0 571.8    6. TYRESE on CPAP G47.33 327.23     Z99.89 V46.8          Follow-up and Dispositions    · Return in about 6 months (around 9/16/2020) for CPE-30mins. I have reviewed with the patient details of the assessment and plan and all questions were answered. Relevent patient education was performed. Verbal and/or written instructions (see AVS) provided. The most recent lab findings were reviewed with the patient. Plan was discussed with patient who verbally expressed understanding. An After Visit Summary was printed and given to the patient.     Rosendo Wynn MD

## 2020-03-16 NOTE — PROGRESS NOTES
Isreal Babcock presents today at the clinic for    Chief Complaint   Patient presents with   Elias Complete Physical        Wt Readings from Last 3 Encounters:   03/16/20 295 lb (133.8 kg)   05/10/19 271 lb (122.9 kg)   02/08/19 289 lb 3.2 oz (131.2 kg)     Temp Readings from Last 3 Encounters:   03/16/20 98.4 °F (36.9 °C) (Oral)   05/10/19 97.9 °F (36.6 °C) (Oral)   02/08/19 98.5 °F (36.9 °C) (Oral)     BP Readings from Last 3 Encounters:   03/16/20 120/82   05/10/19 122/84   02/08/19 118/84     Pulse Readings from Last 3 Encounters:   03/16/20 78   05/10/19 81   02/08/19 81       Health Maintenance Due   Topic    Shingrix Vaccine Age 50> (1 of 2)    FOBT Q1Y Age 54-65     Influenza Age 5 to Adult          Learning Assessment:  :     Learning Assessment 1/11/2018   PRIMARY LEARNER Patient   HIGHEST LEVEL OF EDUCATION - PRIMARY LEARNER  4 YEARS OF COLLEGE   BARRIERS PRIMARY LEARNER NONE   CO-LEARNER CAREGIVER No   PRIMARY LANGUAGE ENGLISH   LEARNER PREFERENCE PRIMARY READING   ANSWERED BY patient   RELATIONSHIP SELF       Depression Screening:  :     3 most recent PHQ Screens 3/16/2020   Little interest or pleasure in doing things Not at all   Feeling down, depressed, irritable, or hopeless Not at all   Total Score PHQ 2 0       Fall Risk Assessment:  :     Fall Risk Assessment, last 12 mths 3/16/2020   Able to walk? Yes   Fall in past 12 months? No   Fall with injury? -   Number of falls in past 12 months -   Fall Risk Score -       Abuse Screening:  :     Abuse Screening Questionnaire 3/16/2020 1/11/2018   Do you ever feel afraid of your partner? N N   Are you in a relationship with someone who physically or mentally threatens you? N N   Is it safe for you to go home? Y Y       Coordination of Care Questionnaire:  :     1. Have you been to the ER, urgent care clinic since your last visit? Hospitalized since your last visit? no    2.  Have you seen or consulted any other health care providers outside of the [facility] [address] since your last visit? Include any pap smears or colon screening.    Patient states he sees an allergist.

## 2020-03-17 LAB
ERYTHROCYTE [DISTWIDTH] IN BLOOD BY AUTOMATED COUNT: 13.9 % (ref 11.6–15.4)
EST. AVERAGE GLUCOSE BLD GHB EST-MCNC: 111 MG/DL
HBA1C MFR BLD: 5.5 % (ref 4.8–5.6)
HCT VFR BLD AUTO: 48.2 % (ref 37.5–51)
HGB BLD-MCNC: 17 G/DL (ref 13–17.7)
MCH RBC QN AUTO: 29.9 PG (ref 26.6–33)
MCHC RBC AUTO-ENTMCNC: 35.3 G/DL (ref 31.5–35.7)
MCV RBC AUTO: 85 FL (ref 79–97)
PLATELET # BLD AUTO: 215 X10E3/UL (ref 150–450)
RBC # BLD AUTO: 5.68 X10E6/UL (ref 4.14–5.8)
WBC # BLD AUTO: 5.6 X10E3/UL (ref 3.4–10.8)

## 2020-03-23 DIAGNOSIS — F41.9 ANXIETY: ICD-10-CM

## 2020-03-23 RX ORDER — CLONAZEPAM 0.5 MG/1
TABLET ORAL
Qty: 180 TAB | Refills: 0 | Status: SHIPPED | OUTPATIENT
Start: 2020-03-23 | End: 2020-07-01 | Stop reason: SDUPTHER

## 2020-04-07 RX ORDER — ATORVASTATIN CALCIUM 10 MG/1
10 TABLET, FILM COATED ORAL DAILY
Qty: 90 TAB | Refills: 3 | Status: SHIPPED | OUTPATIENT
Start: 2020-04-07 | End: 2021-03-27

## 2020-04-07 NOTE — TELEPHONE ENCOUNTER
Requested Prescriptions     Pending Prescriptions Disp Refills    atorvastatin (LIPITOR) 10 mg tablet 90 Tab 3     Sig: Take 1 Tab by mouth daily.        Last Refill: 2/4/2019  Last Office Visit: 3/16/2020  Future Office Visit: 9/17/2020

## 2020-07-01 DIAGNOSIS — F41.9 ANXIETY: ICD-10-CM

## 2020-07-01 RX ORDER — CLONAZEPAM 0.5 MG/1
TABLET ORAL
Qty: 180 TAB | Refills: 0 | Status: SHIPPED | OUTPATIENT
Start: 2020-07-01 | End: 2020-09-28

## 2020-07-01 NOTE — TELEPHONE ENCOUNTER
Last Refill: 3/23/2020  Last Office Visit: 3/16/2020  Future Office Visit:  9/17/2020    Requested Prescriptions     Pending Prescriptions Disp Refills    clonazePAM (KlonoPIN) 0.5 mg tablet 180 Tab 0     Sig: TAKE 2 TABLETS BY MOUTH EVERY DAY AT BEDTIME AS NEEDED

## 2020-09-14 DIAGNOSIS — I10 ESSENTIAL HYPERTENSION: Primary | ICD-10-CM

## 2020-09-14 RX ORDER — AMLODIPINE BESYLATE 5 MG/1
5 TABLET ORAL DAILY
Qty: 90 TAB | Refills: 3 | Status: SHIPPED | OUTPATIENT
Start: 2020-09-14 | End: 2021-12-14 | Stop reason: SDUPTHER

## 2020-09-16 NOTE — PROGRESS NOTES
This note will not be viewable in [address] Krissy Kevin is a 44 y.o. male and presents with Complete Physical      Subjective:  He prefers to be called in [place]. He is unmarried and works for the state. Lives in [place]. At baselinepatient reports he has been trying to lose weight however it is been a challenge especially given the COVID-19 situation. However from the last office visit he is down by 7 pounds. He was on the keto in the past they have is not sustainable for him long-term. He has a history of hypertension, hyperlipidemia, prediabetes and obstructive sleep apnea. His blood pressure is well controlled on losartan and Norvasc. He denies headaches or blurred vision. He has mild cardiomyopathy the with grade 1 diastolic dysfunction and EF around 55 to 60%. Reviewed 2D echo from 2018. He has intermittent mild shortness of breath and the Bumex helps with that. Hyperlipidemia on statin. Denies muscle cramps or myalgias. History of obstructive sleep apnea on CPAP. He currently uses setting 7.5. He denies morning headaches or napping during the day. Denies fatigue.       Past Medical History:   Diagnosis Date    Arthritis     Asthma 8/2/2017    BMI 40.0-44.9, adult ([facility] [address] 8/2/2017    Depression 8/2/2017    Deviated nasal septum 8/2/2017    Essential hypertension 8/3/2017    Fatty liver 8/2/2017    Hyperlipidemia 8/2/2017    Hypertension     IGT (impaired glucose tolerance) 8/2/2017    Insomnia 8/2/2017    Knee pain, left 8/2/2017    Metatarsalgia 8/2/2017    TYRESE on CPAP 8/2/2017    Plantar fasciitis 8/2/2017    Psychiatric disorder     anxiety and depression    Sleep apnea     cpap    Vitamin D deficiency 8/2/2017     Past Surgical History:   Procedure Laterality Date    HX KNEE ARTHROSCOPY Left     med menisectomy    HX ORTHOPAEDIC  1999 2006    bilateral shoulder decompressions    HX REFRACTIVE SURGERY Bilateral 2006     Allergies   Allergen Reactions  Lisinopril Unknown (comments)     Current Outpatient Medications   Medication Sig Dispense Refill    amLODIPine (NORVASC) 5 mg tablet Take 1 Tab by mouth daily. Indications: high blood pressure 90 Tab 3    clonazePAM (KlonoPIN) 0.5 mg tablet TAKE 2 TABLETS BY MOUTH EVERY DAY AT BEDTIME AS NEEDED 180 Tab 0    atorvastatin (LIPITOR) 10 mg tablet Take 1 Tab by mouth daily. 90 Tab 3    buPROPion XL (WELLBUTRIN XL) 300 mg XL tablet TAKE 1 TABLET BY MOUTH DAILY 90 Tab 3    bumetanide (BUMEX) 0.5 mg tablet TAKE 1 TABLET BY MOUTH EVERY DAY 90 Tab 0    losartan (COZAAR) 100 mg tablet TAKE 1 TABLET BY MOUTH EVERY DAY 90 Tab 3    Cetirizine (ZYRTEC) 10 mg cap Take  by mouth daily as needed.  albuterol (PROVENTIL HFA, VENTOLIN HFA, PROAIR HFA) 90 mcg/actuation inhaler Take 2 Puffs by inhalation every four (4) hours as needed for Wheezing.  cholecalciferol, vitamin D3, (VITAMIN D3) 2,000 unit tab Take 2,000 Units by mouth daily.  multivitamin (ONE A DAY) tablet Take 1 Tab by mouth daily.  sodium chloride-aloe vera (AYR SALINE) topical gel Apply  to affected area once.  aspirin delayed-release 81 mg tablet Take 81 mg by mouth daily.  OTHER Indications: ALLERGY SHOTS       Social History     Socioeconomic History    Marital status: SINGLE     Spouse name: Not on file    Number of children: Not on file    Years of education: Not on file    Highest education level: Not on file   Tobacco Use    Smoking status: Never Smoker    Smokeless tobacco: Never Used   Substance and Sexual Activity    Alcohol use: Yes     Comment: 3 - 4 drinks / wk    Drug use: Never    Sexual activity: Yes     Birth control/protection: Condom     Family History   Problem Relation Age of Onset    High Cholesterol Mother     Cancer Father         lung    Other Father         lymphoma       Review of Systems  ROS is unremarkable except for ones highlighted in bold.    Constitutional: negative for fevers, chills, anorexia and weight loss  Eyes:   negative for visual disturbance and irritation  ENT:   negative for tinnitus,sore throat,nasal congestion,ear pain,hoarseness  Respiratory:  negative for cough, hemoptysis, dyspnea,wheezing  CV:   negative for chest pain, palpitations, lower extremity edema  GI:   negative for nausea, vomiting, diarrhea, abdominal pain,melena  Endo:               negative for polyuria,polydipsia,polyphagia,heat intolerance  Genitourinary: negative for frequency, dysuria and hematuria  Integumentary: negative for rash and pruritus  Hematologic:  negative for easy bruising and gum/nose bleeding  Musculoskel: negative for myalgias, arthralgias, back pain, muscle weakness, joint pain  Neurological:  negative for headaches, dizziness, vertigo, memory problems and gait   Behavl/Psych: negative for feelings of anxiety, depression, mood changes  ROS otherwise negative     Objective:  Visit Vitals  /72 (BP 1 Location: Left arm, BP Patient Position: Sitting)   Pulse 90   Temp 97.9 °F (36.6 °C)   Resp 14   Ht 5' 10\" (1.778 m)   Wt 288 lb (130.6 kg)   SpO2 98%   BMI 41.32 kg/m²     Physical Exam:   General appearance - alert, well appearing, and in no distress  Mental status - alert, oriented to person, place, and time  EYE-RAJWINDER, EOMI, fundi normal, corneas normal, no foreign bodies  ENT-ENT exam normal, no neck nodes or sinus tenderness  Nose - normal and patent, no erythema, discharge or polyps  Mouth - mucous membranes moist, pharynx normal without lesions  Neck - supple, no significant adenopathy   Chest - clear to auscultation, no wheezes, rales or rhonchi, symmetric air entry   Heart - normal rate, regular rhythm, normal S1, S2, no murmurs, rubs, clicks or gallops   Abdomen - soft, nontender, nondistended, no masses or organomegaly  Lymph- no adenopathy palpable  Ext-peripheral pulses normal, no pedal edema, no clubbing or cyanosis  Skin-Warm and dry.  no hyperpigmentation, vitiligo, or suspicious lesions  Neuro -alert, oriented, normal speech, no focal findings or movement disorder noted  Musculoskeletal- FROM, no bony abnormalities, no point tenderness    Lab Review:  Results for orders placed or performed in visit on 03/16/20   CBC W/O DIFF   Result Value Ref Range    WBC 5.6 3.4 - 10.8 x10E3/uL    RBC 5.68 4.14 - 5.80 x10E6/uL    HGB 17.0 13.0 - 17.7 g/dL    HCT 48.2 37.5 - 51.0 %    MCV 85 79 - 97 fL    MCH 29.9 26.6 - 33.0 pg    MCHC 35.3 31.5 - 35.7 g/dL    RDW 13.9 11.6 - 15.4 %    PLATELET 664 301 - 299 x10E3/uL   LIPID PANEL   Result Value Ref Range    Cholesterol, total 105 0 - 200 mg/dL    Triglyceride 112 0 - 200 mg/dL    HDL Cholesterol 35 35 - 130 mg/dL    VLDL 22 mg/dL    LDL, calculated 48 0 - 130 mg/dL    CHOL/HDL Ratio 3 0 - 4 Ratio    LDL/HDL Ratio 1 Ratio   METABOLIC PANEL, COMPREHENSIVE   Result Value Ref Range    Glucose 96 75 - 110 mg/dL    BUN 15.0 9.0 - 20.0 mg/dL    Creatinine 1.1 0.8 - 1.5 mg/dL    Sodium 144 137 - 145 mmol/L    Potassium 4.5 3.6 - 5.0 mmol/L    Chloride 104 98 - 107 mmol/L    CO2 26.0 22.0 - 32.0 mmol/L    Calcium 9.5 8.4 - 10.2 mg/dl    Protein, total 7.1 6.3 - 8.2 g/dL    Albumin 4.4 3.9 - 5.4 g/dL    ALT (SGPT) 45 0 - 50 U/L    AST (SGOT) 34.0 14.0 - 36.0 U/L    Alk. phosphatase 116 38 - 126 U/L    Bilirubin, total 0.8 0.2 - 1.3 mg/dL    BUN/Creatinine ratio 14 Ratio    GFR est AA >60 mL/min/1.73m2    GFR est non-AA >60 mL/min/1.73m2    Globulin 2.70     A-G Ratio 1.6 Ratio    Anion gap 14 mmol/L   HEMOGLOBIN A1C WITH EAG   Result Value Ref Range    Hemoglobin A1c 5.5 4.8 - 5.6 %    Estimated average glucose 111 mg/dL        Documenation Review:  Dyslipidemia. Hypertension. 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 TYRESE, on CPAP at 7. Fatty liver. Vitamin D deficiency. History of depression. Seasonal allergies and episodic asthma, currently on allergy shots. History of metatarsalgia and plantar fasciitis with previous right ankle injury. This has been improved with better shoe wear. Deviated nasal septum. History of insomnia. History of impaired glucose tolerance. Obesity with BMI greater than 40. Bilateral medial meniscal tear, status post arthroscopic repair on the left. This improved the knee pain. He subsequently had arthroscopic surgery on the right knee, which has improved things as well. He is not having that much in the way of pain and we talked about tapering off of Meloxicam if possible. Unspecified eating disorder with poor ability to lose weight. Cervical spondylosis and radiculopathy with left arm pain and numbness at one point. Physical therapy improved this significantly. MRI of the cervical spine revealed C6-7 disc and left foraminal stenosis. Health Maintenance Issues and Ancillary Studies:  1. TDAP (pertussis and tetanus) vaccine was given in October, 2013.  2. Seasonal influenza vaccine . 2019  3. Pneumovax 23 given October, 2011. 4. EBT heart scan revealed a calcium score of 37.6 in October, 2016.  5. Nuclear stress testing was negative in February, 2018 with ejection fraction of 47%. 6. Echocardiogram in January, 2018 revealed ejection fraction of 01-11% and diastolic dysfunction. 7. Cervical spine MRI revealed a C6-7 HNP with left foraminal stenosis in August, 2017. 8. MRI of both knees, the right in June, 2017 and the left in July, 2016 revealed medial meniscal tears. 9. EKG in the office is normal.  10. Pulmonary function studies are normal.  11. Colonoscopy in December 2018. Procedure was performed by Dr. Beth Christianson. History of colon polyp s/p polypectomy. Repeat colonoscopy in 5 years. December, 2023. Assessment/Plan:    Diagnoses and all orders for this visit:    1. Encounter for annual physical exam    2. Essential hypertension  -     CBC W/O DIFF  -     METABOLIC PANEL, COMPREHENSIVE  -     URINALYSIS W/MICROSCOPIC    3. Hyperlipidemia, unspecified hyperlipidemia type  -     LIPID PANEL    4. Cardiomyopathy, unspecified type ([place] [address]    5. IGT (impaired glucose tolerance)  -     HEMOGLOBIN A1C W/O EAG    6. TYRESE on CPAP    7. Anxiety    8. Recurrent depression ([facility]    9. Fatigue, unspecified type  -     TSH 3RD GENERATION    10. BMI 40.0-44.9, adult ([place] [address]    11. Vitamin D deficiency  -     VITAMIN D, 25 HYDROXY    12. Prostate cancer screening  -     PSA SCREENING (SCREENING)    13. History of colon polyps       I have given him a brochure regarding [facility] Carrillo. He will check with its covered under his insurance and if he would like to further pursue. He will make a follow-up visit. Patient is up-to-date on all health screenings and immunization. Shingles vaccine is overdue and he wants to defer it for now. Continue current meds  I will call with lab results and make further recommendations or adjustments if necessary. Discussed lifestyle modifications including Na restriction, low carb/fat diet, weight reduction and exercise (at least a walking program). ICD-10-CM ICD-9-CM    1. Encounter for annual physical exam  Z00.00 V70.0    2. Essential hypertension  I10 401.9 CBC W/O DIFF      METABOLIC PANEL, COMPREHENSIVE      URINALYSIS W/MICROSCOPIC   3. Hyperlipidemia, unspecified hyperlipidemia type  E78.5 272.4 LIPID PANEL   4. Cardiomyopathy, unspecified type ([place] [address]  I42.9 425.4    5. IGT (impaired glucose tolerance)  R73.02 790.22 HEMOGLOBIN A1C W/O EAG   6. TYRESE on CPAP  G47.33 327.23     Z99.89 V46.8    7. Anxiety  F41.9 300.00    8. Recurrent depression (HCC)  F33.9 296.30    9. Fatigue, unspecified type  R53.83 780.79 TSH 3RD GENERATION   10. BMI 40.0-44.9, adult ([facility]  Z68.41 V85.41    11. Vitamin D deficiency  E55.9 268.9 VITAMIN D, 25 HYDROXY   12. Prostate cancer screening  Z12.5 V76.44 PSA SCREENING (SCREENING)   13. History of colon polyps  Z86.010 V12.72          Follow-up and Dispositions    · Return in about 3 months (around 12/17/2020) for follow up.          I have reviewed with the patient details of the assessment and plan and all questions were answered. Relevent patient education was performed. Verbal and/or written instructions (see AVS) provided. The most recent lab findings were reviewed with the patient. Plan was discussed with patient who verbally expressed understanding. An After Visit Summary was printed and given to the patient.     Cristian Lambert MD

## 2020-09-17 ENCOUNTER — OFFICE VISIT (OUTPATIENT)
Dept: INTERNAL MEDICINE CLINIC | Age: 50
End: 2020-09-17
Payer: COMMERCIAL

## 2020-09-17 VITALS
HEART RATE: 90 BPM | HEIGHT: 70 IN | WEIGHT: 288 LBS | TEMPERATURE: 97.9 F | BODY MASS INDEX: 41.23 KG/M2 | OXYGEN SATURATION: 98 % | DIASTOLIC BLOOD PRESSURE: 72 MMHG | SYSTOLIC BLOOD PRESSURE: 104 MMHG | RESPIRATION RATE: 14 BRPM

## 2020-09-17 DIAGNOSIS — E55.9 VITAMIN D DEFICIENCY: ICD-10-CM

## 2020-09-17 DIAGNOSIS — I42.9 CARDIOMYOPATHY, UNSPECIFIED TYPE (HCC): ICD-10-CM

## 2020-09-17 DIAGNOSIS — E78.5 HYPERLIPIDEMIA, UNSPECIFIED HYPERLIPIDEMIA TYPE: ICD-10-CM

## 2020-09-17 DIAGNOSIS — R73.02 IGT (IMPAIRED GLUCOSE TOLERANCE): ICD-10-CM

## 2020-09-17 DIAGNOSIS — Z12.5 PROSTATE CANCER SCREENING: ICD-10-CM

## 2020-09-17 DIAGNOSIS — R53.83 FATIGUE, UNSPECIFIED TYPE: ICD-10-CM

## 2020-09-17 DIAGNOSIS — Z00.00 ENCOUNTER FOR ANNUAL PHYSICAL EXAM: Primary | ICD-10-CM

## 2020-09-17 DIAGNOSIS — F41.9 ANXIETY: ICD-10-CM

## 2020-09-17 DIAGNOSIS — F33.9 RECURRENT DEPRESSION (HCC): ICD-10-CM

## 2020-09-17 DIAGNOSIS — G47.33 OSA ON CPAP: ICD-10-CM

## 2020-09-17 DIAGNOSIS — Z86.010 HISTORY OF COLON POLYPS: ICD-10-CM

## 2020-09-17 DIAGNOSIS — I10 ESSENTIAL HYPERTENSION: ICD-10-CM

## 2020-09-17 DIAGNOSIS — Z99.89 OSA ON CPAP: ICD-10-CM

## 2020-09-17 LAB
25(OH)D3 SERPL-MCNC: 42 NG/ML (ref 30–96)
A-G RATIO,AGRAT: 1.6 RATIO
ALBUMIN SERPL-MCNC: 4.5 G/DL (ref 3.9–5.4)
ALP SERPL-CCNC: 118 U/L (ref 38–126)
ALT SERPL-CCNC: 62 U/L (ref 0–50)
ANION GAP SERPL CALC-SCNC: 10 MMOL/L
AST SERPL W P-5'-P-CCNC: 36 U/L (ref 14–36)
BACTERIA,BACTU: ABNORMAL
BILIRUB SERPL-MCNC: 0.6 MG/DL (ref 0.2–1.3)
BILIRUB UR QL: NEGATIVE
BUN SERPL-MCNC: 16 MG/DL (ref 9–20)
BUN/CREATININE RATIO,BUCR: 18 RATIO
CALCIUM SERPL-MCNC: 9.6 MG/DL (ref 8.4–10.2)
CHLORIDE SERPL-SCNC: 106 MMOL/L (ref 98–107)
CHOL/HDL RATIO,CHHD: 3 RATIO (ref 0–4)
CHOLEST SERPL-MCNC: 106 MG/DL (ref 0–200)
CLARITY: CLEAR
CO2 SERPL-SCNC: 27 MMOL/L (ref 22–32)
COLOR UR: ABNORMAL
CREAT SERPL-MCNC: 0.9 MG/DL (ref 0.8–1.5)
ERYTHROCYTE [DISTWIDTH] IN BLOOD BY AUTOMATED COUNT: 14 %
GLOBULIN,GLOB: 2.8
GLUCOSE 24H UR-MRATE: NEGATIVE G/(24.H)
GLUCOSE SERPL-MCNC: 115 MG/DL (ref 75–110)
HBA1C MFR BLD HPLC: 5.6 % (ref 4–5.7)
HCT VFR BLD AUTO: 52.4 % (ref 37–51)
HDLC SERPL-MCNC: 37 MG/DL (ref 35–130)
HGB BLD-MCNC: 17.4 G/DL (ref 12–18)
HGB UR QL STRIP: NEGATIVE
KETONES UR QL STRIP.AUTO: NEGATIVE
LDL/HDL RATIO,LDHD: 1 RATIO
LDLC SERPL CALC-MCNC: 45 MG/DL (ref 0–130)
LEUKOCYTE ESTERASE: NEGATIVE
MCH RBC QN AUTO: 29.4 PG (ref 26–32)
MCHC RBC AUTO-ENTMCNC: 33.2 G/DL (ref 30–36)
MCV RBC AUTO: 88.5 FL (ref 80–97)
NITRITE UR QL STRIP.AUTO: NEGATIVE
PH UR STRIP: 5 [PH] (ref 5–7)
PLATELET # BLD AUTO: 211 K/UL (ref 140–440)
POTASSIUM SERPL-SCNC: 4.8 MMOL/L (ref 3.6–5)
PROT SERPL-MCNC: 7.3 G/DL (ref 6.3–8.2)
PROT UR STRIP-MCNC: NEGATIVE MG/DL
PSA, TEST22: 0.2 NG/ML (ref 0–4)
RBC # BLD AUTO: 5.92 M/UL (ref 4.2–6.3)
RBC #/AREA URNS HPF: 0 #/HPF
SODIUM SERPL-SCNC: 143 MMOL/L (ref 137–145)
SP GR UR REFRACTOMETRY: 1.02 (ref 1–1.03)
TRIGL SERPL-MCNC: 118 MG/DL (ref 0–200)
TSH SERPL DL<=0.05 MIU/L-ACNC: 1.82 UIU/ML (ref 0.34–5.6)
UROBILINOGEN UR QL STRIP.AUTO: NEGATIVE
VLDLC SERPL CALC-MCNC: 24 MG/DL
WBC # BLD AUTO: 6.5 K/UL (ref 4.1–10.9)
WBC URNS QL MICRO: ABNORMAL #/HPF

## 2020-09-17 PROCEDURE — 99214 OFFICE O/P EST MOD 30 MIN: CPT | Performed by: INTERNAL MEDICINE

## 2020-09-17 PROCEDURE — 82306 VITAMIN D 25 HYDROXY: CPT | Performed by: INTERNAL MEDICINE

## 2020-09-17 PROCEDURE — G0103 PSA SCREENING: HCPCS | Performed by: INTERNAL MEDICINE

## 2020-09-17 PROCEDURE — 84443 ASSAY THYROID STIM HORMONE: CPT | Performed by: INTERNAL MEDICINE

## 2020-09-17 PROCEDURE — 80053 COMPREHEN METABOLIC PANEL: CPT | Performed by: INTERNAL MEDICINE

## 2020-09-17 PROCEDURE — 99396 PREV VISIT EST AGE 40-64: CPT | Performed by: INTERNAL MEDICINE

## 2020-09-17 PROCEDURE — 83036 HEMOGLOBIN GLYCOSYLATED A1C: CPT | Performed by: INTERNAL MEDICINE

## 2020-09-17 PROCEDURE — 80061 LIPID PANEL: CPT | Performed by: INTERNAL MEDICINE

## 2020-09-17 PROCEDURE — 81001 URINALYSIS AUTO W/SCOPE: CPT | Performed by: INTERNAL MEDICINE

## 2020-09-17 PROCEDURE — 85027 COMPLETE CBC AUTOMATED: CPT | Performed by: INTERNAL MEDICINE

## 2020-09-17 NOTE — PATIENT INSTRUCTIONS
Starting a Weight Loss Plan: Care Instructions Your Care Instructions If you are thinking about losing weight, it can be hard to know where to start. Your doctor can help you set up a weight loss plan that best meets your needs. You may want to take a class on nutrition or exercise, or join a weight loss support group. If you have questions about how to make changes to your eating or exercise habits, ask your doctor about seeing a registered dietitian or an exercise specialist. 
It can be a big challenge to lose weight. But you do not have to make huge changes at once. Make small changes, and stick with them. When those changes become habit, add a few more changes. If you do not think you are ready to make changes right now, try to pick a date in the future. Make an appointment to see your doctor to discuss whether the time is right for you to start a plan. Follow-up care is a key part of your treatment and safety. Be sure to make and go to all appointments, and call your doctor if you are having problems. It's also a good idea to know your test results and keep a list of the medicines you take. How can you care for yourself at home? · Set realistic goals. Many people expect to lose much more weight than is likely. A weight loss of 5% to 10% of your body weight may be enough to improve your health. · Get family and friends involved to provide support. Talk to them about why you are trying to lose weight, and ask them to help. They can help by participating in exercise and having meals with you, even if they may be eating something different. · Find what works best for you. If you do not have time or do not like to cook, a program that offers meal replacement bars or shakes may be better for you. Or if you like to prepare meals, finding a plan that includes daily menus and recipes may be best. 
· Ask your doctor about other health professionals who can help you achieve your weight loss goals. ? A dietitian can help you make healthy changes in your diet. ? An exercise specialist or  can help you develop a safe and effective exercise program. 
? A counselor or psychiatrist can help you cope with issues such as depression, anxiety, or family problems that can make it hard to focus on weight loss. · Consider joining a support group for people who are trying to lose weight. Your doctor can suggest groups in your area. Where can you learn more? Go to http://hussein-conrad.info/ Enter J138 in the search box to learn more about \"Starting a Weight Loss Plan: Care Instructions. \" Current as of: December 11, 2019               Content Version: 12.6 © 2297-5550 [employer]. Care instructions adapted under license by [employer] (which disclaims liability or warranty for this information). If you have questions about a medical condition or this instruction, always ask your healthcare professional. [address] any warranty or liability for your use of this information.

## 2020-09-17 NOTE — PROGRESS NOTES
Radha Ray is a 44 y.o. male presenting for Complete Physical  .     1. Have you been to the ER, urgent care clinic since your last visit? Hospitalized since your last visit? No    2. Have you seen or consulted any other health care providers outside of the [address] [place] [address] since your last visit? Include any pap smears or colon screening. No    Fall Risk Assessment, last 12 mths 3/16/2020   Able to walk? Yes   Fall in past 12 months? No   Fall with injury? -   Number of falls in past 12 months -   Fall Risk Score -         Abuse Screening Questionnaire 3/16/2020   Do you ever feel afraid of your partner? N   Are you in a relationship with someone who physically or mentally threatens you? N   Is it safe for you to go home? Y       3 most recent PHQ Screens 3/16/2020   Little interest or pleasure in doing things Not at all   Feeling down, depressed, irritable, or hopeless Not at all   Total Score PHQ 2 0       There are no discontinued medications.

## 2020-09-27 DIAGNOSIS — F41.9 ANXIETY: ICD-10-CM

## 2020-09-28 RX ORDER — CLONAZEPAM 0.5 MG/1
TABLET ORAL
Qty: 180 TAB | Refills: 0 | Status: SHIPPED | OUTPATIENT
Start: 2020-09-28 | End: 2020-12-28 | Stop reason: SDUPTHER

## 2020-11-20 NOTE — TELEPHONE ENCOUNTER
Last Refill: 7/26/2019  Last Visit: 9/17/2020   Next Visit: 12/14/2020    Requested Prescriptions     Pending Prescriptions Disp Refills    losartan (COZAAR) 100 mg tablet 90 Tab 3

## 2020-11-23 RX ORDER — LOSARTAN POTASSIUM 100 MG/1
100 TABLET ORAL DAILY
Qty: 90 TAB | Refills: 3 | Status: SHIPPED | OUTPATIENT
Start: 2020-11-23 | End: 2022-02-14 | Stop reason: SDUPTHER

## 2020-12-13 NOTE — PROGRESS NOTES
Rayne Trevizo is a 44 y.o. male and presents with Hypertension (3 mo fu)      Subjective:  He prefers to be called in [employer]. He is unmarried and works for the state. Lives in [place]. Patient comes in today for his 3-month follow-up. Has been doing great. He did try keto in the past however not recently. His weight is pretty much been the same. He has a history of hypertension, hyperlipidemia, prediabetes and obstructive sleep apnea. His blood pressure is well controlled on losartan and Norvasc. He denies headaches or blurred vision. He has mild cardiomyopathy the with grade 1 diastolic dysfunction and EF around 55 to 60%. Reviewed 2D echo from 2018. He has intermittent mild shortness of breath and the Bumex helps with that. Hyperlipidemia on statin. Denies muscle cramps or myalgias. History of obstructive sleep apnea on CPAP. He currently uses setting 7.5. He denies morning headaches or napping during the day. Denies fatigue.       Past Medical History:   Diagnosis Date    Arthritis     Asthma 8/2/2017    BMI 40.0-44.9, adult ([address] 8/2/2017    Depression 8/2/2017    Deviated nasal septum 8/2/2017    Essential hypertension 8/3/2017    Fatty liver 8/2/2017    Hyperlipidemia 8/2/2017    Hypertension     IGT (impaired glucose tolerance) 8/2/2017    Insomnia 8/2/2017    Knee pain, left 8/2/2017    Metatarsalgia 8/2/2017    TYRESE on CPAP 8/2/2017    Plantar fasciitis 8/2/2017    Psychiatric disorder     anxiety and depression    Sleep apnea     cpap    Vitamin D deficiency 8/2/2017     Past Surgical History:   Procedure Laterality Date    HX KNEE ARTHROSCOPY Left     med menisectomy    HX ORTHOPAEDIC  1999 2006    bilateral shoulder decompressions    HX REFRACTIVE SURGERY Bilateral 2006     Allergies   Allergen Reactions    Lisinopril Unknown (comments)     Current Outpatient Medications   Medication Sig Dispense Refill    losartan (COZAAR) 100 mg tablet Take 1 Tab by mouth daily. 90 Tab 3    clonazePAM (KlonoPIN) 0.5 mg tablet TAKE 2 TABLETS BY MOUTH AT BEDTIME AS NEEDED 180 Tab 0    amLODIPine (NORVASC) 5 mg tablet Take 1 Tab by mouth daily. Indications: high blood pressure 90 Tab 3    atorvastatin (LIPITOR) 10 mg tablet Take 1 Tab by mouth daily. 90 Tab 3    buPROPion XL (WELLBUTRIN XL) 300 mg XL tablet TAKE 1 TABLET BY MOUTH DAILY 90 Tab 3    Cetirizine (ZYRTEC) 10 mg cap Take  by mouth daily as needed.  albuterol (PROVENTIL HFA, VENTOLIN HFA, PROAIR HFA) 90 mcg/actuation inhaler Take 2 Puffs by inhalation every four (4) hours as needed for Wheezing.  cholecalciferol, vitamin D3, (VITAMIN D3) 2,000 unit tab Take 2,000 Units by mouth daily.  multivitamin (ONE A DAY) tablet Take 1 Tab by mouth daily.  sodium chloride-aloe vera (AYR SALINE) topical gel Apply  to affected area once.  aspirin delayed-release 81 mg tablet Take 81 mg by mouth daily.  OTHER Indications: ALLERGY SHOTS       Social History     Socioeconomic History    Marital status: SINGLE     Spouse name: Not on file    Number of children: Not on file    Years of education: Not on file    Highest education level: Not on file   Tobacco Use    Smoking status: Never Smoker    Smokeless tobacco: Never Used   Substance and Sexual Activity    Alcohol use: Yes     Comment: 3 - 4 drinks / wk    Drug use: Never    Sexual activity: Yes     Birth control/protection: Condom     Family History   Problem Relation Age of Onset    High Cholesterol Mother     Cancer Father         lung    Other Father         lymphoma       Review of Systems  ROS is unremarkable except for ones highlighted in bold.    Constitutional: negative for fevers, chills, anorexia and weight loss  Eyes:   negative for visual disturbance and irritation  ENT:   negative for tinnitus,sore throat,nasal congestion,ear pain,hoarseness  Respiratory:  negative for cough, hemoptysis, dyspnea,wheezing  CV: negative for chest pain, palpitations, lower extremity edema  GI:   negative for nausea, vomiting, diarrhea, abdominal pain,melena  Endo:               negative for polyuria,polydipsia,polyphagia,heat intolerance  Genitourinary: negative for frequency, dysuria and hematuria  Integumentary: negative for rash and pruritus  Hematologic:  negative for easy bruising and gum/nose bleeding  Musculoskel: negative for myalgias, arthralgias, back pain, muscle weakness, joint pain  Neurological:  negative for headaches, dizziness, vertigo, memory problems and gait   Behavl/Psych: negative for feelings of anxiety, depression, mood changes  ROS otherwise negative     Objective:  Visit Vitals  /70 (BP 1 Location: Left arm, BP Patient Position: Sitting)   Pulse 96   Temp 98.8 °F (37.1 °C)   Resp 14   Ht 5' 10\" (1.778 m)   Wt 287 lb (130.2 kg)   SpO2 97%   BMI 41.18 kg/m²     Physical Exam:   General appearance - alert, well appearing, and in no distress  Mental status - alert, oriented to person, place, and time  EYE-RAJWINDER, EOMI, fundi normal, corneas normal, no foreign bodies  ENT-ENT exam normal, no neck nodes or sinus tenderness  Nose - normal and patent, no erythema, discharge or polyps  Mouth - mucous membranes moist, pharynx normal without lesions  Neck - supple, no significant adenopathy   Chest - clear to auscultation, no wheezes, rales or rhonchi, symmetric air entry   Heart - normal rate, regular rhythm, normal S1, S2, no murmurs, rubs, clicks or gallops   Abdomen - soft, nontender, nondistended, no masses or organomegaly  Lymph- no adenopathy palpable  Ext-peripheral pulses normal, no pedal edema, no clubbing or cyanosis  Skin-Warm and dry.  no hyperpigmentation, vitiligo, or suspicious lesions  Neuro -alert, oriented, normal speech, no focal findings or movement disorder noted  Musculoskeletal- FROM, no bony abnormalities, no point tenderness    Lab Review:  Results for orders placed or performed in visit on 09/17/20   CBC W/O DIFF   Result Value Ref Range    WBC 6.5 4.1 - 10.9 K/uL    RBC 5.92 4.20 - 6.30 M/uL    HGB 17.4 12.0 - 18.0 g/dL    HCT 52.4 (H) 37.0 - 51.0 %    MCH 29.4 26.0 - 32.0 pg    MCHC 33.2 30.0 - 36.0 g/dL    MCV 88.5 80.0 - 97.0 fL    RDW 14.0 %    PLATELET 760.7 426.3 - 440.0 K/uL   LIPID PANEL   Result Value Ref Range    Cholesterol, total 106 0 - 200 mg/dL    Triglyceride 118 0 - 200 mg/dL    HDL Cholesterol 37 35 - 130 mg/dL    VLDL 24 mg/dL    LDL, calculated 45 0 - 130 mg/dL    CHOL/HDL Ratio 3 0 - 4 Ratio    LDL/HDL Ratio 1 Ratio   METABOLIC PANEL, COMPREHENSIVE   Result Value Ref Range    Glucose 115 (H) 75 - 110 mg/dL    BUN 16.0 9.0 - 20.0 mg/dL    Creatinine 0.9 0.8 - 1.5 mg/dL    Sodium 143 137 - 145 mmol/L    Potassium 4.8 3.6 - 5.0 mmol/L    Chloride 106 98 - 107 mmol/L    CO2 27.0 22.0 - 32.0 mmol/L    Calcium 9.6 8.4 - 10.2 mg/dl    Protein, total 7.3 6.3 - 8.2 g/dL    Albumin 4.5 3.9 - 5.4 g/dL    ALT (SGPT) 62 (H) 0 - 50 U/L    AST (SGOT) 36.0 14.0 - 36.0 U/L    Alk.  phosphatase 118 38 - 126 U/L    Bilirubin, total 0.6 0.2 - 1.3 mg/dL    BUN/Creatinine ratio 18 Ratio    GFR est AA >60 mL/min/1.73m2    GFR est non-AA >60 mL/min/1.73m2    Globulin 2.80     A-G Ratio 1.6 Ratio    Anion gap 10 mmol/L   HEMOGLOBIN A1C W/O EAG   Result Value Ref Range    Hemoglobin A1c 5.6 4.0 - 5.7 %   TSH 3RD GENERATION   Result Value Ref Range    TSH, 3rd generation 1.82 0.34 - 5.60 uIU/mL   VITAMIN D, 25 HYDROXY   Result Value Ref Range    VITAMIN D, 25-HYDROXY 42 30 - 96 ng/mL   PSA SCREENING (SCREENING)   Result Value Ref Range    PSA 0.2 0.0 - 4.0 ng/mL   URINALYSIS W/MICROSCOPIC   Result Value Ref Range    Color Pale Yellow Pale Yellow - Yellow    CLARITY clear Clear    Glucose urine, 24 hr Negative Negative    Ketone Negative Negative    Bilirubin Negative Negative    Specific gravity 1.025 1.000 - 1.030    pH (UA) 5 5 - 7    Blood Negative Negative    Protein Negative Negative    Urobilinogen Negative Negative    Nitrites Negative Negative    Leukocyte Esterase Negative Negative    WBC 0-3 (A) 0 #/HPF    RBC 0 0 #/HPF    Bacteria Occasional (A) None Seen        Documenation Review:  Dyslipidemia. Hypertension. 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 TYRESE, on CPAP at 7. Fatty liver. Vitamin D deficiency. History of depression. Seasonal allergies and episodic asthma, currently on allergy shots. History of metatarsalgia and plantar fasciitis with previous right ankle injury. This has been improved with better shoe wear. Deviated nasal septum. History of insomnia. History of impaired glucose tolerance. Obesity with BMI greater than 40. Bilateral medial meniscal tear, status post arthroscopic repair on the left. This improved the knee pain. He subsequently had arthroscopic surgery on the right knee, which has improved things as well. He is not having that much in the way of pain and we talked about tapering off of Meloxicam if possible. Unspecified eating disorder with poor ability to lose weight. Cervical spondylosis and radiculopathy with left arm pain and numbness at one point. Physical therapy improved this significantly. MRI of the cervical spine revealed C6-7 disc and left foraminal stenosis. Assessment/Plan:    Diagnoses and all orders for this visit:    1. Essential hypertension    2. IGT (impaired glucose tolerance)    3. Recurrent depression ([address]    4. Dilated cardiomyopathy ([address]    5. BMI 40.0-44.9, adult [facility])       Continue current meds  I will call with lab results and make further recommendations or adjustments if necessary. Discussed lifestyle modifications including Na restriction, low carb/fat diet, weight reduction and exercise (at least a walking program). ICD-10-CM ICD-9-CM    1. Essential hypertension  I10 401.9    2. IGT (impaired glucose tolerance)  R73.02 790.22    3. Recurrent depression (HCC)  F33.9 296.30    4.  Dilated cardiomyopathy ([facility]  I42.0 425.4    5. BMI 40.0-44.9, adult [facility])  Z68.41 V85.41              I have reviewed with the patient details of the assessment and plan and all questions were answered. Relevent patient education was performed. Verbal and/or written instructions (see AVS) provided. The most recent lab findings were reviewed with the patient. Plan was discussed with patient who verbally expressed understanding. An After Visit Summary was printed and given to the patient.     Марина Zhang MD

## 2020-12-14 ENCOUNTER — OFFICE VISIT (OUTPATIENT)
Dept: INTERNAL MEDICINE CLINIC | Age: 50
End: 2020-12-14
Payer: COMMERCIAL

## 2020-12-14 VITALS
DIASTOLIC BLOOD PRESSURE: 70 MMHG | TEMPERATURE: 98.8 F | SYSTOLIC BLOOD PRESSURE: 108 MMHG | HEIGHT: 70 IN | WEIGHT: 287 LBS | RESPIRATION RATE: 14 BRPM | OXYGEN SATURATION: 97 % | HEART RATE: 96 BPM | BODY MASS INDEX: 41.09 KG/M2

## 2020-12-14 DIAGNOSIS — I42.0 DILATED CARDIOMYOPATHY (HCC): ICD-10-CM

## 2020-12-14 DIAGNOSIS — I10 ESSENTIAL HYPERTENSION: Primary | ICD-10-CM

## 2020-12-14 DIAGNOSIS — F33.9 RECURRENT DEPRESSION (HCC): ICD-10-CM

## 2020-12-14 DIAGNOSIS — R73.02 IGT (IMPAIRED GLUCOSE TOLERANCE): ICD-10-CM

## 2020-12-14 PROCEDURE — 99214 OFFICE O/P EST MOD 30 MIN: CPT | Performed by: INTERNAL MEDICINE

## 2020-12-14 NOTE — PROGRESS NOTES
Anatoliy Albarran is a 44 y.o. male presenting for Hypertension (3 mo fu)  . 1. Have you been to the ER, urgent care clinic since your last visit? Hospitalized since your last visit? No    2. Have you seen or consulted any other health care providers outside of the [address] [place] [address] since your last visit? Include any pap smears or colon screening. No    Fall Risk Assessment, last 12 mths 3/16/2020   Able to walk? Yes   Fall in past 12 months? No   Fall with injury? -   Number of falls in past 12 months -   Fall Risk Score -         Abuse Screening Questionnaire 3/16/2020   Do you ever feel afraid of your partner? N   Are you in a relationship with someone who physically or mentally threatens you? N   Is it safe for you to go home? Y       3 most recent PHQ Screens 3/16/2020   Little interest or pleasure in doing things Not at all   Feeling down, depressed, irritable, or hopeless Not at all   Total Score PHQ 2 0       There are no discontinued medications.

## 2020-12-14 NOTE — PATIENT INSTRUCTIONS
Starting a Weight Loss Plan: Care Instructions Your Care Instructions If you are thinking about losing weight, it can be hard to know where to start. Your doctor can help you set up a weight loss plan that best meets your needs. You may want to take a class on nutrition or exercise, or join a weight loss support group. If you have questions about how to make changes to your eating or exercise habits, ask your doctor about seeing a registered dietitian or an exercise specialist. 
It can be a big challenge to lose weight. But you do not have to make huge changes at once. Make small changes, and stick with them. When those changes become habit, add a few more changes. If you do not think you are ready to make changes right now, try to pick a date in the future. Make an appointment to see your doctor to discuss whether the time is right for you to start a plan. Follow-up care is a key part of your treatment and safety. Be sure to make and go to all appointments, and call your doctor if you are having problems. It's also a good idea to know your test results and keep a list of the medicines you take. How can you care for yourself at home? · Set realistic goals. Many people expect to lose much more weight than is likely. A weight loss of 5% to 10% of your body weight may be enough to improve your health. · Get family and friends involved to provide support. Talk to them about why you are trying to lose weight, and ask them to help. They can help by participating in exercise and having meals with you, even if they may be eating something different. · Find what works best for you. If you do not have time or do not like to cook, a program that offers meal replacement bars or shakes may be better for you. Or if you like to prepare meals, finding a plan that includes daily menus and recipes may be best. 
· Ask your doctor about other health professionals who can help you achieve your weight loss goals. ? A dietitian can help you make healthy changes in your diet. ? An exercise specialist or  can help you develop a safe and effective exercise program. 
? A counselor or psychiatrist can help you cope with issues such as depression, anxiety, or family problems that can make it hard to focus on weight loss. · Consider joining a support group for people who are trying to lose weight. Your doctor can suggest groups in your area. Where can you learn more? Go to http://www.gray.com/ Enter J585 in the search box to learn more about \"Starting a Weight Loss Plan: Care Instructions. \" Current as of: December 11, 2019               Content Version: 12.6 © 8513-3132 [employer]. Care instructions adapted under license by [employer] (which disclaims liability or warranty for this information). If you have questions about a medical condition or this instruction, always ask your healthcare professional. [address] any warranty or liability for your use of this information.

## 2020-12-28 DIAGNOSIS — F41.9 ANXIETY: ICD-10-CM

## 2020-12-28 RX ORDER — CLONAZEPAM 0.5 MG/1
TABLET ORAL
Qty: 180 TAB | Refills: 0 | Status: SHIPPED | OUTPATIENT
Start: 2020-12-28 | End: 2020-12-31

## 2020-12-28 NOTE — TELEPHONE ENCOUNTER
Last Refill: 12/14/2020  Last Visit: 12/14/2020   Next Visit: 6/16/2021    Requested Prescriptions     Pending Prescriptions Disp Refills    clonazePAM (KlonoPIN) 0.5 mg tablet 180 Tab 0     Sig: TAKE 2 TABLETS BY MOUTH AT BEDTIME AS NEEDED

## 2020-12-30 DIAGNOSIS — F41.9 ANXIETY: ICD-10-CM

## 2020-12-31 RX ORDER — CLONAZEPAM 0.5 MG/1
TABLET ORAL
Qty: 180 TAB | Refills: 0 | Status: SHIPPED | OUTPATIENT
Start: 2020-12-31 | End: 2021-03-30

## 2021-03-27 RX ORDER — ATORVASTATIN CALCIUM 10 MG/1
TABLET, FILM COATED ORAL
Qty: 90 TAB | Refills: 3 | Status: SHIPPED | OUTPATIENT
Start: 2021-03-27 | End: 2022-05-26 | Stop reason: SDUPTHER

## 2021-03-30 DIAGNOSIS — F41.9 ANXIETY: ICD-10-CM

## 2021-03-30 RX ORDER — BUPROPION HYDROCHLORIDE 300 MG/1
TABLET ORAL
Qty: 90 TAB | Refills: 3 | Status: SHIPPED | OUTPATIENT
Start: 2021-03-30 | End: 2022-08-24 | Stop reason: SDUPTHER

## 2021-03-30 RX ORDER — CLONAZEPAM 0.5 MG/1
TABLET ORAL
Qty: 180 TAB | Refills: 0 | Status: SHIPPED | OUTPATIENT
Start: 2021-03-30 | End: 2021-07-06

## 2021-04-15 ENCOUNTER — TELEPHONE (OUTPATIENT)
Dept: INTERNAL MEDICINE CLINIC | Age: 51
End: 2021-04-15

## 2021-04-15 DIAGNOSIS — G47.33 OSA (OBSTRUCTIVE SLEEP APNEA): Primary | ICD-10-CM

## 2021-04-15 NOTE — TELEPHONE ENCOUNTER
Patient does not have an updated sleep doctor for his CPAP. He would like a referral so he can see someone to get his supplies refilled. He states he has not seen one in about 5-7 years.   CB: [phone]

## 2021-06-10 ENCOUNTER — VIRTUAL VISIT (OUTPATIENT)
Dept: SLEEP MEDICINE | Age: 51
End: 2021-06-10
Payer: COMMERCIAL

## 2021-06-10 ENCOUNTER — TELEPHONE (OUTPATIENT)
Dept: SLEEP MEDICINE | Age: 51
End: 2021-06-10

## 2021-06-10 DIAGNOSIS — G47.33 OSA ON CPAP: Primary | ICD-10-CM

## 2021-06-10 DIAGNOSIS — Z99.89 OSA ON CPAP: Primary | ICD-10-CM

## 2021-06-10 DIAGNOSIS — I10 ESSENTIAL HYPERTENSION: ICD-10-CM

## 2021-06-10 PROCEDURE — 99204 OFFICE O/P NEW MOD 45 MIN: CPT | Performed by: INTERNAL MEDICINE

## 2021-06-10 NOTE — TELEPHONE ENCOUNTER
LM to schedule PAP download, obtain release form for old sleep records, and if unavailable, schedule HSAT  (prior auth not yet initiated!) HSAT or old records needed for provider to consider ordering new device.

## 2021-06-10 NOTE — PROGRESS NOTES
[address] [place]. [place], [address]  Tel.  [phone]  Fax. 100 [facility] 60  [place], [address] [facility]  Tel.  [phone]  Fax. [phone] [address] [facility] 3 Alfreda Soriano  Tel.  [phone]  Fax. [phone]         Subjective:        Telemedicine visit performed with verbal consent of the patient. Patient called and identity confirmed with 2 patient identifers     Patient was seen at home  Tejinder Diamond is a 46 y.o. male who was seen by synchronous (real-time) audio-video technology on 6/10/2021. Consent:  He and/or his healthcare decision maker is aware that this patient-initiated Telehealth encounter is the equivalent to a face to face encounter in the sleep disorder center and has provided verbal consent to proceed: Yes    I was in the office while conducting this encounter. Tejinder Diamond is an 46 y.o. male -referred for evaluation for a sleep disorder. He complains of snoring, periods of not breathing, if he does not use his PAP associated with not feeling as well rested despite nightly use of his PAP. He was diagnosed over 15 years ago and been on nightly PAP use since. He does feel it improves his sleep quality. His device is on 7.5 cmH20. He has recently gained weight. Symptoms began several months ago, gradually worsening since that time. he was diagnosed with sleep apnea 15 years ago. he was started on CPAP at a setting of 7.5 cm H20.  he has been using it regularly. He usually can fall asleep in 15-60 minutes. Tejinder Diamond does wake up frequently at night. He is bothered by waking up too early and left unable to get back to sleep. He actually sleeps about 6 hours at night and wakes up about 4 times during the night. He does work shifts:  First Shift. Rekha Schwartz indicates he does get too little sleep at night. His bedtime is 1100. He awakens at 0700. He does not take naps. He takes   naps a week lasting  . He has the following observed behaviors: Loud snoring, Light snoring, Pauses in breathing, Grinding teeth;  (waking with a gasp or snort). Other remarks: He works for the [employer]. He has gained weight recently. He got out of the habit of exercising. [place] Sleepiness Score: 6       Allergies   Allergen Reactions    Lisinopril Unknown (comments)         Current Outpatient Medications:     buPROPion XL (WELLBUTRIN XL) 300 mg XL tablet, TAKE 1 TABLET BY MOUTH EVERY DAY, Disp: 90 Tab, Rfl: 3    clonazePAM (KlonoPIN) 0.5 mg tablet, TAKE 2 TABLETS BY MOUTH EVERY DAY AT BEDTIME AS NEEDED, Disp: 180 Tab, Rfl: 0    atorvastatin (LIPITOR) 10 mg tablet, TAKE 1 TABLET BY MOUTH EVERY DAY, Disp: 90 Tab, Rfl: 3    losartan (COZAAR) 100 mg tablet, Take 1 Tab by mouth daily. , Disp: 90 Tab, Rfl: 3    amLODIPine (NORVASC) 5 mg tablet, Take 1 Tab by mouth daily. Indications: high blood pressure, Disp: 90 Tab, Rfl: 3    Cetirizine (ZYRTEC) 10 mg cap, Take  by mouth daily as needed. , Disp: , Rfl:     albuterol (PROVENTIL HFA, VENTOLIN HFA, PROAIR HFA) 90 mcg/actuation inhaler, Take 2 Puffs by inhalation every four (4) hours as needed for Wheezing., Disp: , Rfl:     cholecalciferol, vitamin D3, (VITAMIN D3) 2,000 unit tab, Take 2,000 Units by mouth daily. , Disp: , Rfl:     multivitamin (ONE A DAY) tablet, Take 1 Tab by mouth daily. , Disp: , Rfl:     sodium chloride-aloe vera (AYR SALINE) topical gel, Apply  to affected area once., Disp: , Rfl:     aspirin delayed-release 81 mg tablet, Take 81 mg by mouth daily. , Disp: , Rfl:     OTHER, Indications: ALLERGY SHOTS, Disp: , Rfl:      He  has a past medical history of Arthritis, Asthma (8/2/2017), BMI 40.0-44.9, adult ([facility] [address] (8/2/2017), Depression (8/2/2017), Deviated nasal septum (8/2/2017), Essential hypertension (8/3/2017), Fatty liver (8/2/2017), Hyperlipidemia (8/2/2017), Hypertension, IGT (impaired glucose tolerance) (8/2/2017), Insomnia (8/2/2017), Knee pain, left (8/2/2017), Metatarsalgia (8/2/2017), TYRESE on CPAP (8/2/2017), Plantar fasciitis (8/2/2017), Psychiatric disorder, Sleep apnea, and Vitamin D deficiency (8/2/2017). He  has a past surgical history that includes hx orthopaedic (1999 2006); hx refractive surgery (Bilateral, 2006); and hx knee arthroscopy (Left). He family history includes Cancer in his father; High Cholesterol in his mother; Other in his father. He  reports that he has never smoked. He has never used smokeless tobacco. He reports current alcohol use. He reports that he does not use drugs. Review of Systems:  Constitutional: his weight fluctuates. Eyes:  No blurred vision  CVS:  No significant chest pain  Pulm:  No significant shortness of breath  GI:  No significant nausea or vomiting  :  No significant nocturia  Musculoskeletal:  No significant joint pain at night  Skin:  No significant rashes  Neuro:  No significant dizziness   Psych:   Anxiety and depression controlled    Sleep Review of Systems: notable for intermittent difficulty falling asleep; +frequent awakenings at night;  + dreaming noted; no nightmares ; no early morning headaches; +mild memory problems; +mild concentration issues      Objective:     Weight 285 lb  Vital Signs: (As obtained by patient/caregiver at home)        Constitutional: [x] Appears well-developed and well-nourished [x] No apparent distress      [] Abnormal -     Mental status: [x] Alert and awake  [x] Oriented to person/place/time [x] Able to follow commands    [] Abnormal -     Eyes:   EOM    [x]  Normal    [] Abnormal -   Sclera  [x]  Normal    [] Abnormal -          Discharge [x]  None visible   [] Abnormal -     HENT: [x] Normocephalic, atraumatic  [] Abnormal -   [x] Mouth/Throat: Mucous membranes are moist              External Ears [x] Normal  [] Abnormal -    Neck: [x] No visualized mass [] Abnormal -     Pulmonary/Chest: [x] Respiratory effort normal   [x] No visualized signs of difficulty breathing or respiratory distress        [] Abnormal -       Neurological:        [x] No Facial Asymmetry (Cranial nerve 7 motor function) (limited exam due to video visit)          [x] No gaze palsy        [] Abnormal -          Skin:        [x] No significant exanthematous lesions or discoloration noted on facial skin         [] Abnormal -            Psychiatric:       [x] Normal Affect [] Abnormal -       Other pertinent observable physical exam findings:-          Assessment:       ICD-10-CM ICD-9-CM    1. TYRESE on CPAP  G47.33 327.23     Z99.89 V46.8    2. Essential hypertension  I10 401.9          Plan:       *we will request prevous sleep records and obtain current download. I will order a replacement machine. He will call to ensure we received his previous records. If needed he is agreeable to an HSAT. * He was provided information on sleep apnea including coresponding risk factors and the importance of proper treatment. * Treatment options for sleep apnea were reviewed today. * Counseling was provided regarding proper sleep hygiene, appropriate sleep schedule, need for sleep environment safety and safe driving. * Effect of sleep disturbance on weight was reviewed. We have recommended a dedicated weight loss through appropriate diet and an exercise regimen as significant weight reduction has been shown to reduce severity of obstructive sleep apnea. 2. Insomnia - I have advised a regular sleep wake cycle. he  was advised to avoid looking at the clock during the night. Ideally, the clock face should be turned away. he was advised to minimize caffeine use and to avoid caffeine-containing beverages 8 hours prior to bedtime. Naps were discouraged. A regular exercise schedule, at least 3 hours before bedtime, would be beneficial to improving sleep quality. he was advised to avoid evening light and to use sunglasses in the late afternoon.   Watching TV, using laptops, tablets and smartphones in the evening was discouraged. he  was advised to keep the bedroom cool and dark. Once he starts exercising, he can try cutting the clonazepam in half. All of his questions were addressed. The treatment plan was reviewed with the patient in detail and reviewed with the patient . he understands that the lead technologist will be calling him  with the results and assisting with the next step in the treatment plan as outlined today during the consultation with me. All of his questions were addressed. Thank you for allowing us to participate in your patient's medical care. We'll keep you updated on these investigations. Pursuant to the emergency declaration under the [facility], [facility] waiver authority and the [employer] Act, this Virtual  Visit was conducted, with patient's consent, to reduce the patient's risk of exposure to COVID-19 and provide continuity of care for an established patient. Services were provided through a video synchronous discussion virtually to substitute for in-person clinic visit.     Ryan Bazan MD    Electronically signed by    Nasra Connolly MD  Diplomate in Sleep Medicine  [facility]

## 2021-06-10 NOTE — PATIENT INSTRUCTIONS
[address] [facility] [place]. [address], [address] Tel.  [phone] Fax. 100 [facility] 60 [place], [address] [facility] Tel.  [phone] Fax. [phone] [address] Tel.  [phone] Fax. [phone] Insomnia: After Your Visit Your Care Instructions Insomnia is the inability to sleep well. It is a common problem for most people at some time. Insomnia may make it hard for you to get to sleep, stay asleep, or sleep as long as you need to. This can make you tired and grouchy during the day. It can also make you forgetful, less effective at work, and unhappy. Insomnia can be caused by conditions such as depression or anxiety. Pain can also affect your ability to sleep. When these problems are solved, the insomnia usually clears up. But sometimes bad sleep habits can cause insomnia. If insomnia is affecting your work or your enjoyment of life, you can take steps to improve your sleep. Follow-up care is a key part of your treatment and safety. Be sure to make and go to all appointments, and call your doctor if you are having problems. It's also a good idea to know your test results and keep a list of the medicines you take. How can you care for yourself at home? What to avoid · Do not have drinks with caffeine, such as coffee or black tea, for 8 hours before bed. · Do not smoke or use other types of tobacco near bedtime. Nicotine is a stimulant and can keep you awake. · Avoid drinking alcohol late in the evening, because it can cause you to wake in the middle of the night. · Do not eat a big meal close to bedtime. If you are hungry, eat a light snack. · Do not drink a lot of water close to bedtime, because the need to urinate may wake you up during the night. · Do not read or watch TV in bed. Use the bed only for sleeping and sexual activity. What to try · Go to bed at the same time every night, and wake up at the same time every morning. Do not take naps during the day. · Keep your bedroom quiet, dark, and cool. · Get regular exercise, but not within 3 to 4 hours of your bedtime. Delsa Severance · Sleep on a comfortable pillow and mattress. · If watching the clock makes you anxious, turn it facing away from you so you cannot see the time. · If you worry when you lie down, start a worry book. Well before bedtime, write down your worries, and then set the book and your concerns aside. · Try meditation or other relaxation techniques before you go to bed. · If you cannot fall asleep, get up and go to another room until you feel sleepy. Do something relaxing. Repeat your bedtime routine before you go to bed again. · Make your house quiet and calm about an hour before bedtime. Turn down the lights, turn off the TV, log off the computer, and turn down the volume on music. This can help you relax after a busy day. When should you call for help? Watch closely for changes in your health, and be sure to contact your doctor if: 
· Your efforts to improve your sleep do not work. · Your insomnia gets worse. · You fall asleep during the day. Where can you learn more? Go to MyFrontSteps.be Enter P513 in the search box to learn more about \"Insomnia: After Your Visit. \"  
© 3332-6005 Healthwise, Incorporated. Care instructions adapted under license by [employer] (which disclaims liability or warranty for this information). This care instruction is for use with your licensed healthcare professional. If you have questions about a medical condition or this instruction, always ask your healthcare professional. [place] [address] any warranty or liability for your use of this information. Content Version: 6.5.43199; Last Revised: June 26, 2010 Drowsy Driving:  The [employer] cites drowsiness as a causing factor in more than 347,101 police reported crashes annually, resulting in 76,000 injuries and 1,500 deaths. Other surveys suggest 55% of people polled have driven while drowsy in the past year, 23% had fallen asleep but not crashed, 3% crashed, and 2% had and accident due to drowsy driving. Who is at risk? Young Drivers: One study of drowsy driving accidents states that 55% of the drivers were under 25 years. Of those, 75% were male. Shift Workers and Travelers: People who work overnight or travel across time zones frequently are at higher risk of experiencing Circadian Rhythm Disorders. They are trying to work and function when their body is programed to sleep. Sleep Deprived: Lack of sleep has a serious impact on your ability to pay attention or focus on a task. Consistently getting less than the average of 8 hours your body needs creates partial or cumulative sleep deprivation. Untreated Sleep Disorders: Sleep Apnea, Narcolepsy, R.L.S., and other sleep disorders (untreated) prevent a person from getting enough restful sleep. This leads to excessive daytime sleepiness and increases the risk for drowsy driving accidents by up to 7 times. Medications / Alcohol: Even over the counter medications can cause drowsiness. Medications that impair a drivers attention should have a warning label. Alcohol naturally makes you sleepy and on its own can cause accidents. Combined with excessive drowsiness its effects are amplified. Signs of Drowsy Driving: * You don't remember driving the last few miles * You may drift out of your brittani * You are unable to focus and your thoughts wander * You may yawn more often than normal 
 * You have difficulty keeping your eyes open / nodding off * Missing traffic signs, speeding, or tailgating Prevention-  
Good sleep hygiene, lifestyle and behavioral choices have the most impact on drowsy driving. There is no substitute for sleep and the average person requires 8 hours nightly.  If you find yourself driving drowsy, stop and sleep. Consider the sleep hygiene tips provided during your visit as well. Medication Refill Policy: Refills for all medications require 1 week advance notice. Please have your pharmacy fax a refill request. We are unable to fax, or call in \"controled substance\" medications and you will need to pick these prescriptions up from our office. [email or web address] Activation Thank you for requesting access to [employer]. Please follow the instructions below to securely access and download your online medical record. [employer] allows you to send messages to your doctor, view your test results, renew your prescriptions, schedule appointments, and more. How Do I Sign Up? 1. In your internet browser, go to [email or web address] 2. Click on the First Time User? Click Here link in the Sign In box. You will see the New Member Sign Up page. 3. Enter your [employer] Access Code exactly as it appears below. You will not need to use this code after youve completed the sign-up process. If you do not sign up before the expiration date, you must request a new code. [employer] Access Code: Activation code not generated Current [employer] Status: Active (This is the date your [employer] access code will ) 4. Enter the last four digits of your Social Security Number (xxxx) and Date of Birth (mm/dd/yyyy) as indicated and click Submit. You will be taken to the next sign-up page. 5. Create a [employer] ID. This will be your [employer] login ID and cannot be changed, so think of one that is secure and easy to remember. 6. Create a [employer] password. You can change your password at any time. 7. Enter your Password Reset Question and Answer. This can be used at a later time if you forget your password. 8. Enter your e-mail address. You will receive e-mail notification when new information is available in [address] 9. Click Sign Up. You can now view and download portions of your medical record.  
10. Click the Download Summary menu link to download a portable copy of your medical information. Additional Information If you have questions, please call [phone]. Remember, [employer] is NOT to be used for urgent needs. For medical emergencies, dial 911.

## 2021-06-10 NOTE — Clinical Note
Thank you for the referral.  I will keep you informed of his progress.   [address],  [place]

## 2021-06-15 ENCOUNTER — OFFICE VISIT (OUTPATIENT)
Dept: SLEEP MEDICINE | Age: 51
End: 2021-06-15

## 2021-06-15 ENCOUNTER — TELEPHONE (OUTPATIENT)
Dept: SLEEP MEDICINE | Age: 51
End: 2021-06-15

## 2021-06-15 DIAGNOSIS — G47.33 OSA (OBSTRUCTIVE SLEEP APNEA): Primary | ICD-10-CM

## 2021-06-15 NOTE — TELEPHONE ENCOUNTER
Faxed record request filled out by patient today to [facility]. Spoke to Gina Lopez who states its a 24 hour turn around time. Faxed to [phone].

## 2021-06-16 ENCOUNTER — OFFICE VISIT (OUTPATIENT)
Dept: INTERNAL MEDICINE CLINIC | Age: 51
End: 2021-06-16
Payer: COMMERCIAL

## 2021-06-16 VITALS
BODY MASS INDEX: 42.52 KG/M2 | SYSTOLIC BLOOD PRESSURE: 126 MMHG | DIASTOLIC BLOOD PRESSURE: 76 MMHG | HEART RATE: 87 BPM | RESPIRATION RATE: 14 BRPM | HEIGHT: 70 IN | OXYGEN SATURATION: 98 % | WEIGHT: 297 LBS | TEMPERATURE: 98.1 F

## 2021-06-16 DIAGNOSIS — Z99.89 OSA ON CPAP: Primary | ICD-10-CM

## 2021-06-16 DIAGNOSIS — I10 ESSENTIAL HYPERTENSION: ICD-10-CM

## 2021-06-16 DIAGNOSIS — F41.9 ANXIETY: ICD-10-CM

## 2021-06-16 DIAGNOSIS — G47.33 OSA ON CPAP: Primary | ICD-10-CM

## 2021-06-16 DIAGNOSIS — E78.2 MIXED HYPERLIPIDEMIA: ICD-10-CM

## 2021-06-16 PROCEDURE — 99214 OFFICE O/P EST MOD 30 MIN: CPT | Performed by: INTERNAL MEDICINE

## 2021-06-16 NOTE — PROGRESS NOTES
Michael Moore is a 46 y.o. male and presents with Hypertension (6 mo fu)      Subjective:  He prefers to be called in [place]. He is unmarried and works for the state. Lives in [facility]. Comes in today for follow-up. Since last office visit he did follow-up with sleep specialist.  There has been some changes made to his CPAP. He denies headache or blurred vision, daily morning headaches and has more refreshed sleep now. He has been struggling to lose weight. He was on the keto diet in the past however there was some rebounds. He is 10 pounds up from his last office visit. Hypertensionwell-controlled on current meds. Dyslipidemiaon statin. Denies muscle cramps or myalgia  He has mild cardiomyopathy the with grade 1 diastolic dysfunction and EF around 55 to 60%. Reviewed 2D echo from 2018. He has intermittent mild shortness of breath and the Bumex helps with that.       Past Medical History:   Diagnosis Date    Arthritis     Asthma 8/2/2017    BMI 40.0-44.9, adult ([facility] [address] 8/2/2017    Depression 8/2/2017    Deviated nasal septum 8/2/2017    Essential hypertension 8/3/2017    Fatty liver 8/2/2017    Hyperlipidemia 8/2/2017    Hypertension     IGT (impaired glucose tolerance) 8/2/2017    Insomnia 8/2/2017    Knee pain, left 8/2/2017    Metatarsalgia 8/2/2017    TYRESE on CPAP 8/2/2017    Plantar fasciitis 8/2/2017    Psychiatric disorder     anxiety and depression    Sleep apnea     cpap    Vitamin D deficiency 8/2/2017     Past Surgical History:   Procedure Laterality Date    HX KNEE ARTHROSCOPY Left     med menisectomy    HX ORTHOPAEDIC  1999 2006    bilateral shoulder decompressions    HX REFRACTIVE SURGERY Bilateral 2006     Allergies   Allergen Reactions    Lisinopril Unknown (comments)     Current Outpatient Medications   Medication Sig Dispense Refill    buPROPion XL (WELLBUTRIN XL) 300 mg XL tablet TAKE 1 TABLET BY MOUTH EVERY DAY 90 Tab 3    clonazePAM (KlonoPIN) 0.5 mg tablet TAKE 2 TABLETS BY MOUTH EVERY DAY AT BEDTIME AS NEEDED 180 Tab 0    atorvastatin (LIPITOR) 10 mg tablet TAKE 1 TABLET BY MOUTH EVERY DAY 90 Tab 3    losartan (COZAAR) 100 mg tablet Take 1 Tab by mouth daily. 90 Tab 3    amLODIPine (NORVASC) 5 mg tablet Take 1 Tab by mouth daily. Indications: high blood pressure 90 Tab 3    Cetirizine (ZYRTEC) 10 mg cap Take  by mouth daily as needed.  albuterol (PROVENTIL HFA, VENTOLIN HFA, PROAIR HFA) 90 mcg/actuation inhaler Take 2 Puffs by inhalation every four (4) hours as needed for Wheezing.  cholecalciferol, vitamin D3, (VITAMIN D3) 2,000 unit tab Take 2,000 Units by mouth daily.  multivitamin (ONE A DAY) tablet Take 1 Tab by mouth daily.  sodium chloride-aloe vera (AYR SALINE) topical gel Apply  to affected area once.  aspirin delayed-release 81 mg tablet Take 81 mg by mouth daily. Social History     Socioeconomic History    Marital status: SINGLE     Spouse name: Not on file    Number of children: Not on file    Years of education: Not on file    Highest education level: Not on file   Tobacco Use    Smoking status: Never Smoker    Smokeless tobacco: Never Used   Vaping Use    Vaping Use: Never used   Substance and Sexual Activity    Alcohol use: Yes     Comment: 3 - 4 drinks / wk    Drug use: Never    Sexual activity: Yes     Birth control/protection: Condom     Social Determinants of Health     Financial Resource Strain:     Difficulty of Paying Living Expenses:    Food Insecurity:     Worried About Running Out of Food in the Last Year:     [address] in the Last Year:    Transportation Needs:     Lack of Transportation (Medical):      Lack of Transportation (Non-Medical):    Physical Activity:     Days of Exercise per Week:     Minutes of Exercise per Session:    Stress:     Feeling of Stress :    Social Connections:     Frequency of Communication with Friends and Family:     Frequency of Social Gatherings with Friends and Family:     Attends Gnostic[employer] Services:     Active Member of Clubs or Organizations:     Attends Club or Organization Meetings:     Marital Status:      Family History   Problem Relation Age of Onset    High Cholesterol Mother     Cancer Father         lung    Other Father         lymphoma       Review of Systems  ROS is unremarkable except for ones highlighted in bold.    Constitutional: negative for fevers, chills, anorexia and weight loss  Eyes:   negative for visual disturbance and irritation  ENT:   negative for tinnitus,sore throat,nasal congestion,ear pain,hoarseness  Respiratory:  negative for cough, hemoptysis, dyspnea,wheezing  CV:   negative for chest pain, palpitations, lower extremity edema  GI:   negative for nausea, vomiting, diarrhea, abdominal pain,melena  Endo:               negative for polyuria,polydipsia,polyphagia,heat intolerance  Genitourinary: negative for frequency, dysuria and hematuria  Integumentary: negative for rash and pruritus  Hematologic:  negative for easy bruising and gum/nose bleeding  Musculoskel: negative for myalgias, arthralgias, back pain, muscle weakness, joint pain  Neurological:  negative for headaches, dizziness, vertigo, memory problems and gait   Behavl/Psych: negative for feelings of anxiety, depression, mood changes  ROS otherwise negative     Objective:  Visit Vitals  /76 (BP 1 Location: Left upper arm, BP Patient Position: Sitting, BP Cuff Size: Adult)   Pulse 87   Temp 98.1 °F (36.7 °C)   Resp 14   Ht 5' 10\" (1.778 m)   Wt 297 lb (134.7 kg)   SpO2 98%   BMI 42.62 kg/m²     Physical Exam:   General appearance - alert, well appearing, and in no distress  Mental status - alert, oriented to person, place, and time  EYE-RAJWINDER, EOMI, fundi normal, corneas normal, no foreign bodies  ENT-ENT exam normal, no neck nodes or sinus tenderness  Nose - normal and patent, no erythema, discharge or polyps  Mouth - mucous membranes moist, pharynx normal without lesions  Neck - supple, no significant adenopathy   Chest - clear to auscultation, no wheezes, rales or rhonchi, symmetric air entry   Heart - normal rate, regular rhythm, normal S1, S2, no murmurs, rubs, clicks or gallops   Abdomen - soft, nontender, nondistended, no masses or organomegaly  Lymph- no adenopathy palpable  Ext-peripheral pulses normal, no pedal edema, no clubbing or cyanosis  Skin-Warm and dry. no hyperpigmentation, vitiligo, or suspicious lesions  Neuro -alert, oriented, normal speech, no focal findings or movement disorder noted  Musculoskeletal- FROM, no bony abnormalities, no point tenderness    Lab Review:  Results for orders placed or performed in visit on 09/17/20   CBC W/O DIFF   Result Value Ref Range    WBC 6.5 4.1 - 10.9 K/uL    RBC 5.92 4.20 - 6.30 M/uL    HGB 17.4 12.0 - 18.0 g/dL    HCT 52.4 (H) 37.0 - 51.0 %    MCH 29.4 26.0 - 32.0 pg    MCHC 33.2 30.0 - 36.0 g/dL    MCV 88.5 80.0 - 97.0 fL    RDW 14.0 %    PLATELET 216.7 025.9 - 440.0 K/uL   LIPID PANEL   Result Value Ref Range    Cholesterol, total 106 0 - 200 mg/dL    Triglyceride 118 0 - 200 mg/dL    HDL Cholesterol 37 35 - 130 mg/dL    VLDL 24 mg/dL    LDL, calculated 45 0 - 130 mg/dL    CHOL/HDL Ratio 3 0 - 4 Ratio    LDL/HDL Ratio 1 Ratio   METABOLIC PANEL, COMPREHENSIVE   Result Value Ref Range    Glucose 115 (H) 75 - 110 mg/dL    BUN 16.0 9.0 - 20.0 mg/dL    Creatinine 0.9 0.8 - 1.5 mg/dL    Sodium 143 137 - 145 mmol/L    Potassium 4.8 3.6 - 5.0 mmol/L    Chloride 106 98 - 107 mmol/L    CO2 27.0 22.0 - 32.0 mmol/L    Calcium 9.6 8.4 - 10.2 mg/dl    Protein, total 7.3 6.3 - 8.2 g/dL    Albumin 4.5 3.9 - 5.4 g/dL    ALT (SGPT) 62 (H) 0 - 50 U/L    AST (SGOT) 36.0 14.0 - 36.0 U/L    Alk.  phosphatase 118 38 - 126 U/L    Bilirubin, total 0.6 0.2 - 1.3 mg/dL    BUN/Creatinine ratio 18 Ratio    GFR est AA >60 mL/min/1.73m2    GFR est non-AA >60 mL/min/1.73m2    Globulin 2.80     A-G Ratio 1.6 Ratio    Anion gap 10 mmol/L   HEMOGLOBIN A1C W/O EAG   Result Value Ref Range    Hemoglobin A1c 5.6 4.0 - 5.7 %   TSH 3RD GENERATION   Result Value Ref Range    TSH, 3rd generation 1.82 0.34 - 5.60 uIU/mL   VITAMIN D, 25 HYDROXY   Result Value Ref Range    VITAMIN D, 25-HYDROXY 42 30 - 96 ng/mL   PSA SCREENING (SCREENING)   Result Value Ref Range    PSA 0.2 0.0 - 4.0 ng/mL   URINALYSIS W/MICROSCOPIC   Result Value Ref Range    Color Pale Yellow Pale Yellow - Yellow    CLARITY clear Clear    Glucose urine, 24 hr Negative Negative    Ketone Negative Negative    Bilirubin Negative Negative    Specific gravity 1.025 1.000 - 1.030    pH (UA) 5 5 - 7    Blood Negative Negative    Protein Negative Negative    Urobilinogen Negative Negative    Nitrites Negative Negative    Leukocyte Esterase Negative Negative    WBC 0-3 (A) 0 #/HPF    RBC 0 0 #/HPF    Bacteria Occasional (A) None Seen        Documenation Review:  Dyslipidemia. Hypertension. 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 TYRESE, on CPAP at 7. Fatty liver. Vitamin D deficiency. History of depression. Seasonal allergies and episodic asthma, currently on allergy shots. History of metatarsalgia and plantar fasciitis with previous right ankle injury. This has been improved with better shoe wear. Deviated nasal septum. History of insomnia. History of impaired glucose tolerance. Obesity with BMI greater than 40. Bilateral medial meniscal tear, status post arthroscopic repair on the left. This improved the knee pain. He subsequently had arthroscopic surgery on the right knee, which has improved things as well. He is not having that much in the way of pain and we talked about tapering off of Meloxicam if possible. Unspecified eating disorder with poor ability to lose weight. Cervical spondylosis and radiculopathy with left arm pain and numbness at one point. Physical therapy improved this significantly.   MRI of the cervical spine revealed C6-7 disc and left foraminal stenosis. Assessment/Plan:    Diagnoses and all orders for this visit:    1. TYRESE on CPAP    2. BMI 40.0-44.9, adult ([facility] [address]    3. Essential hypertension  -     METABOLIC PANEL, COMPREHENSIVE; Future  -     CBC W/O DIFF; Future    4. Anxiety    5. Mixed hyperlipidemia  -     LIPID PANEL; Future       Continue current meds  I will call with lab results and make further recommendations or adjustments if necessary. Discussed lifestyle modifications including Na restriction, low carb/fat diet, weight reduction and exercise (at least a walking program). ICD-10-CM ICD-9-CM    1. TYRESE on CPAP  G47.33 327.23     Z99.89 V46.8    2. BMI 40.0-44.9, adult ([facility] [address]  Z68.41 V85.41    3. Essential hypertension  C82 438.5 METABOLIC PANEL, COMPREHENSIVE      CBC W/O DIFF   4. Anxiety  F41.9 300.00    5. Mixed hyperlipidemia  E78.2 272.2 LIPID PANEL         Follow-up and Dispositions    · Return in about 6 months (around 12/16/2021) for CPE-30mins. I have reviewed with the patient details of the assessment and plan and all questions were answered. Relevent patient education was performed. Verbal and/or written instructions (see AVS) provided. The most recent lab findings were reviewed with the patient. Plan was discussed with patient who verbally expressed understanding. An After Visit Summary was printed and given to the patient.     Cecilio Peng MD

## 2021-06-16 NOTE — PATIENT INSTRUCTIONS
Starting a Weight Loss Plan: Care Instructions Overview If you're thinking about losing weight, it can be hard to know where to start. Your doctor can help you set up a weight loss plan that best meets your needs. You may want to take a class on nutrition or exercise, or you could join a weight loss support group. If you have questions about how to make changes to your eating or exercise habits, ask your doctor about seeing a registered dietitian or an exercise specialist. 
It can be a big challenge to lose weight. But you don't have to make huge changes at once. Make small changes, and stick with them. When those changes become habit, add a few more changes. If you don't think you're ready to make changes right now, try to pick a date in the future. Make an appointment to see your doctor to discuss whether the time is right for you to start a plan. Follow-up care is a key part of your treatment and safety. Be sure to make and go to all appointments, and call your doctor if you are having problems. It's also a good idea to know your test results and keep a list of the medicines you take. How can you care for yourself at home? · Set realistic goals. Many people expect to lose much more weight than is likely. A weight loss of 5% to 10% of your body weight may be enough to improve your health. · Get family and friends involved to provide support. Talk to them about why you are trying to lose weight, and ask them to help. They can help by participating in exercise and having meals with you, even if they may be eating something different. · Find what works best for you. If you do not have time or do not like to cook, a program that offers meal replacement bars or shakes may be better for you. Or if you like to prepare meals, finding a plan that includes daily menus and recipes may be best. 
· Ask your doctor about other health professionals who can help you achieve your weight loss goals.  
? A dietitian can help you make healthy changes in your diet. ? An exercise specialist or  can help you develop a safe and effective exercise program. 
? A counselor or psychiatrist can help you cope with issues such as depression, anxiety, or family problems that can make it hard to focus on weight loss. · Consider joining a support group for people who are trying to lose weight. Your doctor can suggest groups in your area. Where can you learn more? Go to http://www.gray.com/ Enter U216 in the search box to learn more about \"Starting a Weight Loss Plan: Care Instructions. \" Current as of: September 23, 2020               Content Version: 12.8 © 5503-8740 [employer]wise, [employer]. Care instructions adapted under license by [employer] (which disclaims liability or warranty for this information). If you have questions about a medical condition or this instruction, always ask your healthcare professional. [address] any warranty or liability for your use of this information.

## 2021-06-16 NOTE — PROGRESS NOTES
Anshul Horan is a 46 y.o. male presenting for Hypertension (6 mo fu)  . 1. Have you been to the ER, urgent care clinic since your last visit? Hospitalized since your last visit? No    2. Have you seen or consulted any other health care providers outside of the [address] [place] [address] since your last visit? Include any pap smears or colon screening. No    Fall Risk Assessment, last 12 mths 6/16/2021   Able to walk? Yes   Fall in past 12 months? 0   Do you feel unsteady? 0   Are you worried about falling 0   Number of falls in past 12 months -   Fall with injury? -         Abuse Screening Questionnaire 6/16/2021   Do you ever feel afraid of your partner? N   Are you in a relationship with someone who physically or mentally threatens you? N   Is it safe for you to go home? Y       3 most recent PHQ Screens 6/16/2021   Little interest or pleasure in doing things Not at all   Feeling down, depressed, irritable, or hopeless Not at all   Total Score PHQ 2 0       There are no discontinued medications.

## 2021-06-21 ENCOUNTER — APPOINTMENT (OUTPATIENT)
Dept: INTERNAL MEDICINE CLINIC | Age: 51
End: 2021-06-21

## 2021-06-21 DIAGNOSIS — E78.2 MIXED HYPERLIPIDEMIA: ICD-10-CM

## 2021-06-21 DIAGNOSIS — I10 ESSENTIAL HYPERTENSION: ICD-10-CM

## 2021-06-21 LAB
ALBUMIN SERPL-MCNC: 3.8 G/DL (ref 3.5–5)
ALBUMIN/GLOB SERPL: 1.2 {RATIO} (ref 1.1–2.2)
ALP SERPL-CCNC: 129 U/L (ref 45–117)
ALT SERPL-CCNC: 71 U/L (ref 12–78)
ANION GAP SERPL CALC-SCNC: 4 MMOL/L (ref 5–15)
AST SERPL-CCNC: 32 U/L (ref 15–37)
BILIRUB SERPL-MCNC: 0.4 MG/DL (ref 0.2–1)
BUN SERPL-MCNC: 15 MG/DL (ref 6–20)
BUN/CREAT SERPL: 15 (ref 12–20)
CALCIUM SERPL-MCNC: 8.9 MG/DL (ref 8.5–10.1)
CHLORIDE SERPL-SCNC: 109 MMOL/L (ref 97–108)
CHOLEST SERPL-MCNC: 124 MG/DL
CO2 SERPL-SCNC: 27 MMOL/L (ref 21–32)
CREAT SERPL-MCNC: 1.03 MG/DL (ref 0.7–1.3)
ERYTHROCYTE [DISTWIDTH] IN BLOOD BY AUTOMATED COUNT: 14.8 % (ref 11.5–14.5)
GLOBULIN SER CALC-MCNC: 3.1 G/DL (ref 2–4)
GLUCOSE SERPL-MCNC: 104 MG/DL (ref 65–100)
HCT VFR BLD AUTO: 47 % (ref 36.6–50.3)
HDLC SERPL-MCNC: 31 MG/DL
HDLC SERPL: 4 {RATIO} (ref 0–5)
HGB BLD-MCNC: 14.8 G/DL (ref 12.1–17)
LDLC SERPL CALC-MCNC: 61.6 MG/DL (ref 0–100)
MCH RBC QN AUTO: 28 PG (ref 26–34)
MCHC RBC AUTO-ENTMCNC: 31.5 G/DL (ref 30–36.5)
MCV RBC AUTO: 89 FL (ref 80–99)
NRBC # BLD: 0 K/UL (ref 0–0.01)
NRBC BLD-RTO: 0 PER 100 WBC
PLATELET # BLD AUTO: 225 K/UL (ref 150–400)
PMV BLD AUTO: 11.1 FL (ref 8.9–12.9)
POTASSIUM SERPL-SCNC: 4.5 MMOL/L (ref 3.5–5.1)
PROT SERPL-MCNC: 6.9 G/DL (ref 6.4–8.2)
RBC # BLD AUTO: 5.28 M/UL (ref 4.1–5.7)
SODIUM SERPL-SCNC: 140 MMOL/L (ref 136–145)
TRIGL SERPL-MCNC: 157 MG/DL (ref ?–150)
VLDLC SERPL CALC-MCNC: 31.4 MG/DL
WBC # BLD AUTO: 5.1 K/UL (ref 4.1–11.1)

## 2021-07-06 DIAGNOSIS — F41.9 ANXIETY: ICD-10-CM

## 2021-07-06 RX ORDER — CLONAZEPAM 0.5 MG/1
TABLET ORAL
Qty: 180 TABLET | Refills: 0 | Status: SHIPPED | OUTPATIENT
Start: 2021-07-06 | End: 2021-10-08 | Stop reason: SDUPTHER

## 2021-10-08 DIAGNOSIS — F41.9 ANXIETY: ICD-10-CM

## 2021-10-08 NOTE — TELEPHONE ENCOUNTER
Last Refill: 07/06/21  Last Visit: Visit date not found   Next Visit: 12/6/2021    Requested Prescriptions     Pending Prescriptions Disp Refills    clonazePAM (KlonoPIN) 0.5 mg tablet 180 Tablet 0

## 2021-10-09 RX ORDER — CLONAZEPAM 0.5 MG/1
TABLET ORAL
Qty: 180 TABLET | Refills: 0 | Status: SHIPPED | OUTPATIENT
Start: 2021-10-09 | End: 2022-01-02 | Stop reason: SDUPTHER

## 2021-11-17 ENCOUNTER — TELEPHONE (OUTPATIENT)
Dept: SLEEP MEDICINE | Age: 51
End: 2021-11-17

## 2021-11-17 DIAGNOSIS — G47.33 OBSTRUCTIVE SLEEP APNEA (ADULT) (PEDIATRIC): Primary | ICD-10-CM

## 2021-11-17 NOTE — TELEPHONE ENCOUNTER
Never received sleep records from former practice. Could we just move forward with HSAT? Patient states testing was 8+ years ago anyways.  Wants to know if that would be beneficial.

## 2021-12-06 ENCOUNTER — OFFICE VISIT (OUTPATIENT)
Dept: INTERNAL MEDICINE CLINIC | Age: 51
End: 2021-12-06
Payer: COMMERCIAL

## 2021-12-06 VITALS
TEMPERATURE: 98.7 F | SYSTOLIC BLOOD PRESSURE: 122 MMHG | DIASTOLIC BLOOD PRESSURE: 85 MMHG | WEIGHT: 286.4 LBS | RESPIRATION RATE: 16 BRPM | OXYGEN SATURATION: 97 % | HEIGHT: 70 IN | HEART RATE: 87 BPM | BODY MASS INDEX: 41 KG/M2

## 2021-12-06 DIAGNOSIS — G47.33 OSA ON CPAP: ICD-10-CM

## 2021-12-06 DIAGNOSIS — F32.0 CURRENT MILD EPISODE OF MAJOR DEPRESSIVE DISORDER WITHOUT PRIOR EPISODE (HCC): ICD-10-CM

## 2021-12-06 DIAGNOSIS — Z79.899 CONTROLLED SUBSTANCE AGREEMENT SIGNED: ICD-10-CM

## 2021-12-06 DIAGNOSIS — Z12.5 SCREENING FOR PROSTATE CANCER: ICD-10-CM

## 2021-12-06 DIAGNOSIS — E78.2 MIXED HYPERLIPIDEMIA: ICD-10-CM

## 2021-12-06 DIAGNOSIS — I10 ESSENTIAL HYPERTENSION: ICD-10-CM

## 2021-12-06 DIAGNOSIS — G47.9 SLEEP DISTURBANCE: ICD-10-CM

## 2021-12-06 DIAGNOSIS — Z99.89 OSA ON CPAP: ICD-10-CM

## 2021-12-06 DIAGNOSIS — Z00.00 PHYSICAL EXAM: Primary | ICD-10-CM

## 2021-12-06 DIAGNOSIS — Z79.899 LONG-TERM CURRENT USE OF BENZODIAZEPINE: ICD-10-CM

## 2021-12-06 DIAGNOSIS — Z79.899 ON STATIN THERAPY: ICD-10-CM

## 2021-12-06 LAB
ALBUMIN SERPL-MCNC: 4.2 G/DL (ref 3.5–5)
ALBUMIN/GLOB SERPL: 1.3 {RATIO} (ref 1.1–2.2)
ALP SERPL-CCNC: 140 U/L (ref 45–117)
ALT SERPL-CCNC: 56 U/L (ref 12–78)
ANION GAP SERPL CALC-SCNC: 7 MMOL/L (ref 5–15)
APPEARANCE UR: CLEAR
AST SERPL-CCNC: 26 U/L (ref 15–37)
BILIRUB SERPL-MCNC: 0.5 MG/DL (ref 0.2–1)
BILIRUB UR QL: NEGATIVE
BUN SERPL-MCNC: 16 MG/DL (ref 6–20)
BUN/CREAT SERPL: 13 (ref 12–20)
CALCIUM SERPL-MCNC: 9.7 MG/DL (ref 8.5–10.1)
CHLORIDE SERPL-SCNC: 106 MMOL/L (ref 97–108)
CHOLEST SERPL-MCNC: 124 MG/DL
CO2 SERPL-SCNC: 26 MMOL/L (ref 21–32)
COLOR UR: ABNORMAL
CREAT SERPL-MCNC: 1.25 MG/DL (ref 0.7–1.3)
CREAT UR-MCNC: 421 MG/DL
EST. AVERAGE GLUCOSE BLD GHB EST-MCNC: 108 MG/DL
GLOBULIN SER CALC-MCNC: 3.3 G/DL (ref 2–4)
GLUCOSE SERPL-MCNC: 106 MG/DL (ref 65–100)
GLUCOSE UR STRIP.AUTO-MCNC: NEGATIVE MG/DL
HBA1C MFR BLD: 5.4 % (ref 4–5.6)
HDLC SERPL-MCNC: 36 MG/DL
HDLC SERPL: 3.4 {RATIO} (ref 0–5)
HGB UR QL STRIP: NEGATIVE
KETONES UR QL STRIP.AUTO: ABNORMAL MG/DL
LDLC SERPL CALC-MCNC: 61.6 MG/DL (ref 0–100)
LEUKOCYTE ESTERASE UR QL STRIP.AUTO: NEGATIVE
MICROALBUMIN UR-MCNC: 2 MG/DL
MICROALBUMIN/CREAT UR-RTO: 5 MG/G (ref 0–30)
NITRITE UR QL STRIP.AUTO: NEGATIVE
PH UR STRIP: 5 [PH] (ref 5–8)
POTASSIUM SERPL-SCNC: 4.6 MMOL/L (ref 3.5–5.1)
PROT SERPL-MCNC: 7.5 G/DL (ref 6.4–8.2)
PROT UR STRIP-MCNC: NEGATIVE MG/DL
PSA SERPL-MCNC: 0.4 NG/ML (ref 0.01–4)
SODIUM SERPL-SCNC: 139 MMOL/L (ref 136–145)
SP GR UR REFRACTOMETRY: 1.03 (ref 1–1.03)
TRIGL SERPL-MCNC: 132 MG/DL (ref ?–150)
TSH SERPL DL<=0.05 MIU/L-ACNC: 2.33 UIU/ML (ref 0.36–3.74)
UROBILINOGEN UR QL STRIP.AUTO: 0.2 EU/DL (ref 0.2–1)
VLDLC SERPL CALC-MCNC: 26.4 MG/DL

## 2021-12-06 PROCEDURE — 93000 ELECTROCARDIOGRAM COMPLETE: CPT | Performed by: NURSE PRACTITIONER

## 2021-12-06 PROCEDURE — 99214 OFFICE O/P EST MOD 30 MIN: CPT | Performed by: NURSE PRACTITIONER

## 2021-12-06 NOTE — PROGRESS NOTES
Ivanna Lockhart is a 46 y.o. male    Chief Complaint   Patient presents with    [facility]     fasting lab/    Complete Physical       Visit Vitals  /85 (BP 1 Location: Left upper arm, BP Patient Position: Sitting, BP Cuff Size: Large adult)   Pulse 87   Temp 98.7 °F (37.1 °C)   Resp 16   Ht 5' 10\" (1.778 m)   Wt 286 lb 6.4 oz (129.9 kg)   SpO2 97%   BMI 41.09 kg/m²           1. Have you been to the ER, urgent care clinic since your last visit? Hospitalized since your last visit? NO    2. Have you seen or consulted any other health care providers outside of the [address] [place] [address] since your last visit? Include any pap smears or colon screening.  NO

## 2021-12-06 NOTE — PROGRESS NOTES
Chief Complaint   Patient presents with    [facility]     fasting lab/    Complete Physical       SUBJECTIVE:    Rahel Moore is a 46 y.o. male who is here today for a general physical exam as well as follow-up regarding his current medications and management of medical conditions. The patient has a medical history which is significant for: Hypertension, mixed lipidemia, major depressive disorder, TYRESE with CPAP use, sleep disturbance, long-term use of benzodiazepine, and morbid obesity. He denies any new or acute complaints at this time. He states he is feeling fairly well overall. The patient continues to take his medications as prescribed, and denies any adverse side effects. He denies any hypotensive side effects of his current blood pressure medications. He denies any muscle myalgias or joint pain with statin use. He continues to use his CPAP nightly for management of his TYRESE. He states that due to use, he has difficulty sleeping, and therefore uses clonazepam nightly to help him to sleep adequately. He states he is not exercising currently, and watches his diet somewhat but not always. He denies any chest pain, chest pressure, shortness of breath, headaches, dizziness, blurred vision, palpitations, or syncope episodes. Current Outpatient Medications   Medication Sig Dispense Refill    clonazePAM (KlonoPIN) 0.5 mg tablet TAKE 2 TABLETS BY MOUTH EVERY DAY AT BEDTIME AS NEEDED 180 Tablet 0    buPROPion XL (WELLBUTRIN XL) 300 mg XL tablet TAKE 1 TABLET BY MOUTH EVERY DAY 90 Tab 3    atorvastatin (LIPITOR) 10 mg tablet TAKE 1 TABLET BY MOUTH EVERY DAY 90 Tab 3    losartan (COZAAR) 100 mg tablet Take 1 Tab by mouth daily. 90 Tab 3    amLODIPine (NORVASC) 5 mg tablet Take 1 Tab by mouth daily.  Indications: high blood pressure 90 Tab 3    albuterol (PROVENTIL HFA, VENTOLIN HFA, PROAIR HFA) 90 mcg/actuation inhaler Take 2 Puffs by inhalation every four (4) hours as needed for Wheezing.  cholecalciferol, vitamin D3, (VITAMIN D3) 2,000 unit tab Take 2,000 Units by mouth daily.  multivitamin (ONE A DAY) tablet Take 1 Tab by mouth daily.  sodium chloride-aloe vera (AYR SALINE) topical gel Apply  to affected area once.  aspirin delayed-release 81 mg tablet Take 81 mg by mouth daily.  Cetirizine (ZYRTEC) 10 mg cap Take  by mouth daily as needed.  (Patient not taking: Reported on 12/6/2021)       Past Medical History:   Diagnosis Date    Arthritis     Asthma 8/2/2017    BMI 40.0-44.9, adult ([facility] [address] 8/2/2017    Depression 8/2/2017    Deviated nasal septum 8/2/2017    Essential hypertension 8/3/2017    Fatty liver 8/2/2017    Hyperlipidemia 8/2/2017    Hypertension     IGT (impaired glucose tolerance) 8/2/2017    Insomnia 8/2/2017    Knee pain, left 8/2/2017    Metatarsalgia 8/2/2017    TYRESE on CPAP 8/2/2017    Plantar fasciitis 8/2/2017    Psychiatric disorder     anxiety and depression    Sleep apnea     cpap    Vitamin D deficiency 8/2/2017     Past Surgical History:   Procedure Laterality Date    HX KNEE ARTHROSCOPY Left     med menisectomy    HX ORTHOPAEDIC  1999 2006    bilateral shoulder decompressions    HX REFRACTIVE SURGERY Bilateral 2006     Allergies   Allergen Reactions    Lisinopril Unknown (comments)       REVIEW OF SYSTEMS:                                        POSITIVE= bold text  Negative = regular text    General:                     fever, chills, sweats, generalized weakness, weight loss/gain,                                       loss of appetite   Eyes:                           blurred vision, eye pain, loss of vision, double vision  ENT:                            rhinorrhea, pharyngitis   Respiratory:               cough, sputum production, SOB, DAN, wheezing, pleuritic pain   Cardiology:                chest pain, palpitations, orthopnea, PND, edema, syncope   Gastrointestinal:       abdominal pain , N/V, diarrhea, dysphagia, constipation, bleeding   Genitourinary:           frequency, urgency, dysuria, hematuria, incontinence   Muskuloskeletal :      arthralgia, myalgia, back pain  Hematology:              easy bruising, nose or gum bleeding, lymphadenopathy   Dermatological:         rash, ulceration, pruritis, color change / jaundice  Endocrine:                 hot flashes or polydipsia   Neurological:             headache, dizziness, confusion, focal weakness, paresthesia,                                      Speech difficulties, memory loss, gait difficulty  Psychological:          Feelings of anxiety, depression, agitation        Social History     Socioeconomic History    Marital status: SINGLE   Tobacco Use    Smoking status: Never Smoker    Smokeless tobacco: Never Used   Vaping Use    Vaping Use: Never used   Substance and Sexual Activity    Alcohol use: Yes     Comment: 3 - 4 drinks / wk    Drug use: Never    Sexual activity: Yes     Birth control/protection: Condom     Family History   Problem Relation Age of Onset    High Cholesterol Mother     Cancer Father         lung    Other Father         lymphoma       OBJECTIVE:     Visit Vitals  /85 (BP 1 Location: Left upper arm, BP Patient Position: Sitting, BP Cuff Size: Large adult)   Pulse 87   Temp 98.7 °F (37.1 °C)   Resp 16   Ht 5' 10\" (1.778 m)   Wt 286 lb 6.4 oz (129.9 kg)   SpO2 97%   BMI 41.09 kg/m²       Constitutional: He appears well nourished, of stated age, and dressed appropriately. Eyes: Sclera anicteric, PERRLA, EOMI  ENT: Nares clear, moist mucous membranes, pharynx clear  Neck: Supple without lymphadenopathy. Thyroid normal, No JVD or bruits  Respiratory: Clear to ascultation X5, normal inspiratory effort, no adventitious breath sounds.   Cardiovascular: Regular rate and rhythm, no murmurs, rubs or gallops, PMI not displaced, No thrills, no peripheral edema  Gastrointestinal: Abdomen non-distended, soft, non-tender, bowel sounds normal and active X4.  Hematologic: No purpura, petechiae or unexplained bruising  Lymphatic: No lymph node enlargemant. Musculoskeletal: No joint pain or swelling on palpation. No AP/Lateral asymmetry noted. Integumentary: No unusual rashes or suspicious skin lesions noted. Nails appear normal.  Peripheral Vascular: Normal pulses palpable to PT/DP. Neuro: Non-focal exam, A & O X 3, DTRs equal and adequate. Psychiatric: Appropriate affect and demeanor, pleasant and cooperative. Patient's thought content and thought processing appear to be within normal limits. ASSESSMENT/PLAN:     ICD-10-CM ICD-9-CM    1. Physical exam  Z00.00 V70.9 AMB POC EKG ROUTINE W/ 12 LEADS, INTER & REP      URINALYSIS W/ RFLX MICROSCOPIC   2. Essential hypertension  I10 401.9 AMB POC EKG ROUTINE W/ 12 LEADS, INTER & REP      MICROALBUMIN, UR, RAND W/ MICROALB/CREAT RATIO      TSH 3RD GENERATION      URINALYSIS W/ RFLX MICROSCOPIC   3. Mixed hyperlipidemia  E78.2 272.2 AMB POC EKG ROUTINE W/ 12 LEADS, INTER & REP      METABOLIC PANEL, COMPREHENSIVE      LIPID PANEL   4. Current mild episode of major depressive disorder without prior episode (HCC)  F32.0 296.21    5. TYRESE on CPAP  G47.33 327.23     Z99.89 V46.8    6. Sleep disturbance  G47.9 780.50    7. BMI 40.0-44.9, adult (HCC)  Z68.41 V85.41 HEMOGLOBIN A1C WITH EAG      TSH 3RD GENERATION   8. Controlled substance agreement signed  Z79.899 V58.69    9. Long-term current use of benzodiazepine  Z79.899 V58.69 TOXASSURE SELECT 13 (MW)   10. On statin therapy  H41.856 Q97.35 METABOLIC PANEL, COMPREHENSIVE      LIPID PANEL   11. Screening for prostate cancer  Z12.5 V76.44 PSA, DIAGNOSTIC (PROSTATE SPECIFIC AG)     1: We will repeat labs today including: CBC, CMP, lipid panel, TSH, PSA, urinalysis, microalbumin, and hemoglobin A1c.  2: EKG performed in office today and read by me. Patient shows no significant ST elevation/depression. There is no signs of T wave ischemia.   Otherwise unremarkable EKG.  3: Patient advised to continue current medications for management of hypertension and mixed lipidemia.  4: Patient to work on healthy lifestyle management including: Overall caloric reduction to promote weight loss, adequate amounts of fiber and water, avoidance of concentrated sweets, avoidance of adding salt, and increase exercise patterns. 5: Patient to continue clonazepam nightly for management of sleep disturbance. Continue CPAP for TYRESE. 6: Controlled substance agreement signed by patient and myself today. 7: Patient to follow-up with me in approximately 6 months, or sooner as needed. Patient states understanding and agrees with plan. ATTENTION:   This medical record was transcribed using an electronic medical records system. Although proofread, it may and can contain electronic and spelling errors. Other human spelling and other errors may be present. Corrections may be executed at a later time. Please feel free to contact us for any clarifications as needed. Signed,  Brian Casey.  Bryce Fowler, MSN APRN FNP-BC

## 2021-12-07 NOTE — PROGRESS NOTES
Prostate appears to be healthy without issue. Urinalysis is unremarkable. Thyroid appears to be functioning normally. Hemoglobin A1c is at 5.4% and shows no signs of diabetes at this time. Cholesterol levels are in very good range. Continue atorvastatin 10 mg nightly. Kidney function, liver functions, and electrolytes are normal.    Urine microalbumin is normal with no signs of damage to kidneys presently. We are awaiting your urine drug screen and will contact you when this arrives.

## 2021-12-13 ENCOUNTER — OFFICE VISIT (OUTPATIENT)
Dept: SLEEP MEDICINE | Age: 51
End: 2021-12-13

## 2021-12-13 DIAGNOSIS — G47.33 OSA (OBSTRUCTIVE SLEEP APNEA): Primary | ICD-10-CM

## 2021-12-13 LAB — DRUGS UR: NORMAL

## 2021-12-14 ENCOUNTER — OFFICE VISIT (OUTPATIENT)
Dept: SLEEP MEDICINE | Age: 51
End: 2021-12-14

## 2021-12-14 ENCOUNTER — HOSPITAL ENCOUNTER (OUTPATIENT)
Dept: SLEEP MEDICINE | Age: 51
Discharge: HOME | End: 2021-12-14
Payer: COMMERCIAL

## 2021-12-14 DIAGNOSIS — G47.33 OSA (OBSTRUCTIVE SLEEP APNEA): Primary | ICD-10-CM

## 2021-12-14 DIAGNOSIS — I10 ESSENTIAL HYPERTENSION: ICD-10-CM

## 2021-12-14 PROCEDURE — 95806 SLEEP STUDY UNATT&RESP EFFT: CPT | Performed by: INTERNAL MEDICINE

## 2021-12-14 RX ORDER — AMLODIPINE BESYLATE 5 MG/1
5 TABLET ORAL DAILY
Qty: 90 TABLET | Refills: 3 | Status: SHIPPED | OUTPATIENT
Start: 2021-12-14

## 2021-12-14 NOTE — TELEPHONE ENCOUNTER
PCP: Wandy Jin NP    Last appt: 12/6/2021  Future Appointments   Date Time Provider Anant Vaca   6/6/2022  9:30 AM Wandy Jin NP [facility] AMB       Requested Prescriptions     Pending Prescriptions Disp Refills    amLODIPine (NORVASC) 5 mg tablet 90 Tablet 3     Sig: Take 1 Tablet by mouth daily.  Indications: high blood pressure

## 2021-12-28 ENCOUNTER — TELEPHONE (OUTPATIENT)
Dept: SLEEP MEDICINE | Age: 51
End: 2021-12-28

## 2021-12-28 DIAGNOSIS — G47.33 OBSTRUCTIVE SLEEP APNEA (ADULT) (PEDIATRIC): Primary | ICD-10-CM

## 2021-12-28 NOTE — TELEPHONE ENCOUNTER
Results of sleep study in [facility]  Lead tech to convey results to patient  HSAT results in [facility]. Test positive for significant sleep apnea. AHI 7/hour and lowest oxygen saturation was 86%. Finger sensor loose at times. We had discussed treatment options at initial consultation. Based on the results of the home sleep apnea test, I will order a replacement PAP. APAP order attached. he should be seen in the sleep disorder center 4-6 weeks after initiating PAP therapy. (I am ordering it at same settings)  Patient should call the office the day he gets set up with new PAP device so we can schedule him for an adherence/compliance visit within 31-90 days of obtaining a new device. Front staff to Order PAP and call patient and let them know which DME company they should be hearing from after results reviewed with lead support technologist.   Schedule for first adherence visit in 6 weeks.

## 2021-12-30 ENCOUNTER — DOCUMENTATION ONLY (OUTPATIENT)
Dept: SLEEP MEDICINE | Age: 51
End: 2021-12-30

## 2022-01-02 DIAGNOSIS — F41.9 ANXIETY: ICD-10-CM

## 2022-01-03 RX ORDER — CLONAZEPAM 0.5 MG/1
TABLET ORAL
Qty: 180 TABLET | Refills: 0 | Status: SHIPPED | OUTPATIENT
Start: 2022-01-03 | End: 2022-04-05 | Stop reason: SDUPTHER

## 2022-01-03 NOTE — TELEPHONE ENCOUNTER
PCP: Makayla Bailey NP    Last appt: 12/6/2021  Future Appointments   Date Time Provider Anant Nola   6/6/2022  9:30 AM Makayla Bailey NP [facility] AMB       Requested Prescriptions     Pending Prescriptions Disp Refills    clonazePAM (KlonoPIN) 0.5 mg tablet 180 Tablet 0     Sig: TAKE 2 TABLETS BY MOUTH EVERY DAY AT BEDTIME AS NEEDED       Prior labs and Blood pressures:  BP Readings from Last 3 Encounters:   12/06/21 122/85   06/16/21 126/76   12/14/20 108/70     Lab Results   Component Value Date/Time    Sodium 139 12/06/2021 09:19 AM    Potassium 4.6 12/06/2021 09:19 AM    Chloride 106 12/06/2021 09:19 AM    CO2 26 12/06/2021 09:19 AM    Anion gap 7 12/06/2021 09:19 AM    Glucose 106 (H) 12/06/2021 09:19 AM    BUN 16 12/06/2021 09:19 AM    Creatinine 1.25 12/06/2021 09:19 AM    BUN/Creatinine ratio 13 12/06/2021 09:19 AM    GFR est AA >60 12/06/2021 09:19 AM    GFR est non-AA >60 12/06/2021 09:19 AM    Calcium 9.7 12/06/2021 09:19 AM     Lab Results   Component Value Date/Time    Hemoglobin A1c 5.4 12/06/2021 09:19 AM    Hemoglobin A1c (POC) 5.7 11/01/2018 08:50 AM     Lab Results   Component Value Date/Time    Cholesterol, total 124 12/06/2021 09:19 AM    Cholesterol (POC) 97.0 11/01/2018 08:50 AM    HDL Cholesterol 36 12/06/2021 09:19 AM    HDL Cholesterol (POC) 38.0 11/01/2018 08:50 AM    LDL Cholesterol (POC) 36.0 11/01/2018 08:50 AM    LDL, calculated 61.6 12/06/2021 09:19 AM    VLDL, calculated 26.4 12/06/2021 09:19 AM    Triglyceride 132 12/06/2021 09:19 AM    Triglycerides (POC) 115.0 11/01/2018 08:50 AM    CHOL/HDL Ratio 3.4 12/06/2021 09:19 AM     Lab Results   Component Value Date/Time    VITAMIN D, 25-HYDROXY 42 09/17/2020 08:54 AM       Lab Results   Component Value Date/Time    TSH 2.33 12/06/2021 09:19 AM    TSH, 3rd generation 1.82 09/17/2020 08:54 AM

## 2022-02-14 NOTE — TELEPHONE ENCOUNTER
PCP: Makayla Bailey NP    Last appt: 12/6/2021  Future Appointments   Date Time Provider Anant Vaca   6/6/2022  9:30 AM Makayla Bailey NP [facility] AMB       Last refilled:    Requested Prescriptions     Pending Prescriptions Disp Refills    losartan (COZAAR) 100 mg tablet 90 Tablet 0     Sig: Take 1 Tablet by mouth daily.

## 2022-02-15 RX ORDER — LOSARTAN POTASSIUM 100 MG/1
100 TABLET ORAL DAILY
Qty: 90 TABLET | Refills: 1 | Status: SHIPPED | OUTPATIENT
Start: 2022-02-15 | End: 2022-08-22

## 2022-03-18 PROBLEM — J45.909 ASTHMA: Status: ACTIVE | Noted: 2017-08-02

## 2022-03-18 PROBLEM — Z00.00 PE (PHYSICAL EXAM), ANNUAL: Status: ACTIVE | Noted: 2017-12-10

## 2022-03-19 PROBLEM — J34.2 DEVIATED NASAL SEPTUM: Status: ACTIVE | Noted: 2017-08-02

## 2022-03-19 PROBLEM — S83.231A COMPLEX TEAR OF MEDIAL MENISCUS OF RIGHT KNEE AS CURRENT INJURY: Status: ACTIVE | Noted: 2017-07-27

## 2022-03-19 PROBLEM — I42.9 CARDIOMYOPATHY (HCC): Status: ACTIVE | Noted: 2018-01-26

## 2022-03-19 PROBLEM — F32.A DEPRESSION: Status: ACTIVE | Noted: 2017-08-02

## 2022-03-19 PROBLEM — E78.5 HYPERLIPIDEMIA: Status: ACTIVE | Noted: 2017-08-02

## 2022-03-19 PROBLEM — J30.9 ALLERGIC RHINITIS: Status: ACTIVE | Noted: 2017-08-02

## 2022-03-19 PROBLEM — E55.9 VITAMIN D DEFICIENCY: Status: ACTIVE | Noted: 2017-08-02

## 2022-03-19 PROBLEM — G47.33 OSA ON CPAP: Status: ACTIVE | Noted: 2017-08-02

## 2022-03-19 PROBLEM — G47.00 INSOMNIA: Status: ACTIVE | Noted: 2017-08-02

## 2022-03-19 PROBLEM — M25.562 KNEE PAIN, LEFT: Status: ACTIVE | Noted: 2017-08-02

## 2022-03-19 PROBLEM — M72.2 PLANTAR FASCIITIS: Status: ACTIVE | Noted: 2017-08-02

## 2022-03-19 PROBLEM — R73.02 IGT (IMPAIRED GLUCOSE TOLERANCE): Status: ACTIVE | Noted: 2017-08-02

## 2022-03-19 PROBLEM — Z99.89 OSA ON CPAP: Status: ACTIVE | Noted: 2017-08-02

## 2022-03-19 PROBLEM — K76.0 FATTY LIVER: Status: ACTIVE | Noted: 2017-08-02

## 2022-03-19 PROBLEM — F33.9 RECURRENT DEPRESSION (HCC): Status: ACTIVE | Noted: 2018-01-11

## 2022-03-19 PROBLEM — Z79.899 LONG-TERM CURRENT USE OF HIGH RISK MEDICATION OTHER THAN ANTICOAGULANT: Status: ACTIVE | Noted: 2018-05-16

## 2022-03-19 PROBLEM — M17.10 OSTEOARTHROSIS, LOCALIZED, PRIMARY, KNEE: Status: ACTIVE | Noted: 2017-07-27

## 2022-03-19 PROBLEM — I10 ESSENTIAL HYPERTENSION: Status: ACTIVE | Noted: 2017-08-03

## 2022-03-19 PROBLEM — M54.12 CERVICAL RADICULOPATHY: Status: ACTIVE | Noted: 2017-11-07

## 2022-03-20 PROBLEM — M77.40 METATARSALGIA: Status: ACTIVE | Noted: 2017-08-02

## 2022-04-04 ENCOUNTER — PATIENT MESSAGE (OUTPATIENT)
Dept: INTERNAL MEDICINE CLINIC | Age: 52
End: 2022-04-04

## 2022-04-04 DIAGNOSIS — F41.9 ANXIETY: ICD-10-CM

## 2022-04-05 RX ORDER — CLONAZEPAM 0.5 MG/1
TABLET ORAL
Qty: 180 TABLET | Refills: 0 | Status: SHIPPED | OUTPATIENT
Start: 2022-04-05 | End: 2022-07-05 | Stop reason: SDUPTHER

## 2022-04-05 NOTE — TELEPHONE ENCOUNTER
From: Sin Lacey  To: Ty Pimentel NP  Sent: 4/4/2022 10:53 PM EDT  Subject: Clonazepam Refill Request    Hello. Could I get a refill request for my Clonazepam prescription sent to the [facility] near your office? This is a refill of the 90 day supply last requested in January.   Thanks very much for your help.  Mine Menezes      PCP: Meghna Dickerson NP    Last appt: 12/6/2021  Future Appointments   Date Time Provider Anant Vaca   6/6/2022  9:30 AM Meghna Dickerson NP [facility] AMB       Requested Prescriptions     Pending Prescriptions Disp Refills    clonazePAM (KlonoPIN) 0.5 mg tablet 180 Tablet 0     Sig: TAKE 2 TABLETS BY MOUTH EVERY DAY AT BEDTIME AS NEEDED       Prior labs and Blood pressures:  BP Readings from Last 3 Encounters:   12/06/21 122/85   06/16/21 126/76   12/14/20 108/70     Lab Results   Component Value Date/Time    Sodium 139 12/06/2021 09:19 AM    Potassium 4.6 12/06/2021 09:19 AM    Chloride 106 12/06/2021 09:19 AM    CO2 26 12/06/2021 09:19 AM    Anion gap 7 12/06/2021 09:19 AM    Glucose 106 (H) 12/06/2021 09:19 AM    BUN 16 12/06/2021 09:19 AM    Creatinine 1.25 12/06/2021 09:19 AM    BUN/Creatinine ratio 13 12/06/2021 09:19 AM    GFR est AA >60 12/06/2021 09:19 AM    GFR est non-AA >60 12/06/2021 09:19 AM    Calcium 9.7 12/06/2021 09:19 AM     Lab Results   Component Value Date/Time    Hemoglobin A1c 5.4 12/06/2021 09:19 AM    Hemoglobin A1c (POC) 5.7 11/01/2018 08:50 AM     Lab Results   Component Value Date/Time    Cholesterol, total 124 12/06/2021 09:19 AM    Cholesterol (POC) 97.0 11/01/2018 08:50 AM    HDL Cholesterol 36 12/06/2021 09:19 AM    HDL Cholesterol (POC) 38.0 11/01/2018 08:50 AM    LDL Cholesterol (POC) 36.0 11/01/2018 08:50 AM    LDL, calculated 61.6 12/06/2021 09:19 AM    VLDL, calculated 26.4 12/06/2021 09:19 AM    Triglyceride 132 12/06/2021 09:19 AM    Triglycerides (POC) 115.0 11/01/2018 08:50 AM    CHOL/HDL Ratio 3.4 12/06/2021 09:19 AM     Lab Results   Component Value Date/Time    VITAMIN D, 25-HYDROXY 42 09/17/2020 08:54 AM       Lab Results   Component Value Date/Time    TSH 2.33 12/06/2021 09:19 AM    TSH, 3rd generation 1.82 09/17/2020 08:54 AM

## 2022-05-26 RX ORDER — ATORVASTATIN CALCIUM 10 MG/1
10 TABLET, FILM COATED ORAL DAILY
Qty: 90 TABLET | Refills: 1 | Status: SHIPPED | OUTPATIENT
Start: 2022-05-26

## 2022-05-26 NOTE — TELEPHONE ENCOUNTER
PCP: Meghna Dickerson NP    Last appt: 12/6/2021  Future Appointments   Date Time Provider Anant Vaca   6/6/2022  9:30 AM Meghna Dickerson NP [facility] AMB       Requested Prescriptions     Pending Prescriptions Disp Refills    atorvastatin (LIPITOR) 10 mg tablet 90 Tablet 3     Sig: Take 1 Tablet by mouth daily.          Other Comments:

## 2022-07-05 ENCOUNTER — OFFICE VISIT (OUTPATIENT)
Dept: INTERNAL MEDICINE CLINIC | Age: 52
End: 2022-07-05
Payer: COMMERCIAL

## 2022-07-05 VITALS
HEIGHT: 70 IN | RESPIRATION RATE: 16 BRPM | TEMPERATURE: 97.7 F | WEIGHT: 290.6 LBS | HEART RATE: 74 BPM | BODY MASS INDEX: 41.6 KG/M2 | DIASTOLIC BLOOD PRESSURE: 76 MMHG | OXYGEN SATURATION: 97 % | SYSTOLIC BLOOD PRESSURE: 130 MMHG

## 2022-07-05 DIAGNOSIS — F32.0 CURRENT MILD EPISODE OF MAJOR DEPRESSIVE DISORDER WITHOUT PRIOR EPISODE (HCC): ICD-10-CM

## 2022-07-05 DIAGNOSIS — Z79.899 LONG-TERM CURRENT USE OF BENZODIAZEPINE: ICD-10-CM

## 2022-07-05 DIAGNOSIS — I10 ESSENTIAL HYPERTENSION: Primary | ICD-10-CM

## 2022-07-05 DIAGNOSIS — Z79.899 ON STATIN THERAPY: ICD-10-CM

## 2022-07-05 DIAGNOSIS — E78.2 MIXED HYPERLIPIDEMIA: ICD-10-CM

## 2022-07-05 DIAGNOSIS — Z79.899 CONTROLLED SUBSTANCE AGREEMENT SIGNED: ICD-10-CM

## 2022-07-05 DIAGNOSIS — Z99.89 OSA ON CPAP: ICD-10-CM

## 2022-07-05 DIAGNOSIS — G47.33 OSA ON CPAP: ICD-10-CM

## 2022-07-05 DIAGNOSIS — F41.9 ANXIETY: ICD-10-CM

## 2022-07-05 PROCEDURE — 99214 OFFICE O/P EST MOD 30 MIN: CPT | Performed by: NURSE PRACTITIONER

## 2022-07-05 RX ORDER — CLONAZEPAM 1 MG/1
TABLET ORAL
Qty: 90 TABLET | Refills: 1 | Status: SHIPPED | OUTPATIENT
Start: 2022-07-05 | End: 2022-07-06 | Stop reason: SDUPTHER

## 2022-07-05 NOTE — PROGRESS NOTES
Chief Complaint   Patient presents with    Hypertension     follow up       SUBJECTIVE:    Ganesh Singh is a 45 y.o. male who is here today for a follow up appointment regarding his hypertension, hyperlipidemia, anxiety, depression, insomnia, and TYRESE with CPAP use. He states he is feeling well overall, and denies any new or acute complaints. The patient continues to take medication for management of his hypertension. His blood pressure appears well controlled at this time, and he denies any adverse side effects of the medication. Additionally, he denies any chest pain, chest pressure, shortness of breath, headaches, dizziness, blurred vision, palpitations, or syncope episodes. He continues to take atorvastatin nightly for management of his cholesterol. He denies any adverse side effects of the medication. The patient is also being managed for depression and anxiety. He states he is taking his Wellbutrin daily and denies any issues. He states it is effective in treating his blood pressure overall. He continues to take clonazepam nightly due to issues with anxiety, insomnia, and CPAP use due to his TYRESE. He states the medication is effective and helpful. Current Outpatient Medications   Medication Sig Dispense Refill    clonazePAM (KlonoPIN) 1 mg tablet TAKE 1 TABLET BY MOUTH EACH NIGHT AT BEDTIME. 90 Tablet 1    atorvastatin (LIPITOR) 10 mg tablet Take 1 Tablet by mouth daily. 90 Tablet 1    losartan (COZAAR) 100 mg tablet Take 1 Tablet by mouth daily. 90 Tablet 1    amLODIPine (NORVASC) 5 mg tablet Take 1 Tablet by mouth daily. Indications: high blood pressure 90 Tablet 3    buPROPion XL (WELLBUTRIN XL) 300 mg XL tablet TAKE 1 TABLET BY MOUTH EVERY DAY 90 Tab 3    Cetirizine (ZYRTEC) 10 mg cap Take  by mouth daily as needed.  albuterol (PROVENTIL HFA, VENTOLIN HFA, PROAIR HFA) 90 mcg/actuation inhaler Take 2 Puffs by inhalation every four (4) hours as needed for Wheezing.       cholecalciferol, vitamin D3, (VITAMIN D3) 2,000 unit tab Take 2,000 Units by mouth daily.  multivitamin (ONE A DAY) tablet Take 1 Tab by mouth daily.  sodium chloride-aloe vera (AYR SALINE) topical gel Apply  to affected area once.  aspirin delayed-release 81 mg tablet Take 81 mg by mouth daily.        Past Medical History:   Diagnosis Date    Arthritis     Asthma 8/2/2017    BMI 40.0-44.9, adult ([address] 8/2/2017    Depression 8/2/2017    Deviated nasal septum 8/2/2017    Essential hypertension 8/3/2017    Fatty liver 8/2/2017    Hyperlipidemia 8/2/2017    Hypertension     IGT (impaired glucose tolerance) 8/2/2017    Insomnia 8/2/2017    Knee pain, left 8/2/2017    Metatarsalgia 8/2/2017    TYRESE on CPAP 8/2/2017    Plantar fasciitis 8/2/2017    Psychiatric disorder     anxiety and depression    Sleep apnea     cpap    Vitamin D deficiency 8/2/2017     Past Surgical History:   Procedure Laterality Date    HX KNEE ARTHROSCOPY Left     med menisectomy    HX ORTHOPAEDIC  1999 2006    bilateral shoulder decompressions    HX REFRACTIVE SURGERY Bilateral 2006     Allergies   Allergen Reactions    Lisinopril Unknown (comments)       REVIEW OF SYSTEMS:                                        POSITIVE= bold text  Negative = regular text    General:                     fever, chills, sweats, generalized weakness, weight loss/gain,                                       loss of appetite   Eyes:                           blurred vision, eye pain, loss of vision, double vision  ENT:                            rhinorrhea, pharyngitis   Respiratory:               cough, sputum production, SOB, DAN, wheezing, pleuritic pain   Cardiology:                chest pain, palpitations, orthopnea, PND, edema, syncope   Gastrointestinal:       abdominal pain , N/V, diarrhea, dysphagia, constipation, bleeding   Genitourinary:           frequency, urgency, dysuria, hematuria, incontinence   Muskuloskeletal : arthralgia, myalgia, back pain  Hematology:              easy bruising, nose or gum bleeding, lymphadenopathy   Dermatological:         rash, ulceration, pruritis, color change / jaundice  Endocrine:                 hot flashes or polydipsia   Neurological:             headache, dizziness, confusion, focal weakness, paresthesia,                                      Speech difficulties, memory loss, gait difficulty  Psychological:          Feelings of anxiety, depression, agitation        Social History     Socioeconomic History    Marital status: SINGLE   Tobacco Use    Smoking status: Never Smoker    Smokeless tobacco: Never Used   Vaping Use    Vaping Use: Never used   Substance and Sexual Activity    Alcohol use: Yes     Comment: 3 - 4 drinks / wk    Drug use: Never    Sexual activity: Yes     Birth control/protection: Condom     Social Determinants of Health     Financial Resource Strain: Low Risk     Difficulty of Paying Living Expenses: Not hard at all   Food Insecurity: No Food Insecurity    Worried About Running Out of Food in the Last Year: Never true    Alissa of Food in the Last Year: Never true     Family History   Problem Relation Age of Onset    High Cholesterol Mother     Cancer Father         lung    Other Father         lymphoma       OBJECTIVE:     Visit Vitals  /76 (BP 1 Location: Left arm, BP Patient Position: Sitting, BP Cuff Size: Adult)   Pulse 74   Temp 97.7 °F (36.5 °C) (Oral)   Resp 16   Ht 5' 10\" (1.778 m)   Wt 290 lb 9.6 oz (131.8 kg)   SpO2 97%   BMI 41.70 kg/m²       Constitutional: He appears well nourished, of stated age, and dressed appropriately. Eyes: Sclera anicteric, PERRLA, EOMI  Neck: Supple without lymphadenopathy. Thyroid normal, No JVD or bruits  Respiratory: Clear to ascultation X5, normal inspiratory effort, no adventitious breath sounds.   Cardiovascular: Regular rate and rhythm, no murmurs, rubs or gallops, PMI not displaced, No thrills, no peripheral edema  Neuro: Non-focal exam, A & O X 3.   Psychiatric: Appropriate affect and demeanor, pleasant and cooperative. Patient's thought content and thought processing appear to be within normal limits. ASSESSMENT/PLAN:     ICD-10-CM ICD-9-CM    1. Essential hypertension  M06 647.5 METABOLIC PANEL, COMPREHENSIVE   2. Mixed hyperlipidemia  E78.2 272.2 LIPID PANEL   3. Anxiety  F41.9 300.00 clonazePAM (KlonoPIN) 1 mg tablet   4. Current mild episode of major depressive disorder without prior episode (HCC)  F32.0 296.21    5. BMI 40.0-44.9, adult ([facility] [address]  Z68.41 V85.41    6. TYRESE on CPAP  G47.33 327.23     Z99.89 V46.8    7. Controlled substance agreement signed  Z79.899 V58.69    8. Long-term current use of benzodiazepine  Z79.899 V58.69    9. On statin therapy  Z79.899 V58.69 CK     1: Patient to return for fasting labs including: CMP, lipid panel, and CK. 2: Patient to continue current medication for management of hypertension, mixed hyperlipidemia, anxiety, and insomnia. 3: Continue healthy lifestyle management including: Low-fat/low-cholesterol diet, adequate amounts of fiber and water, regular exercise patterns, and weight loss. 4: Patient to follow-up with me in approximately 6 months, or sooner as needed. Patient states understanding and agrees with plan. Orders Placed This Encounter    METABOLIC PANEL, COMPREHENSIVE    CK    LIPID PANEL    clonazePAM (KlonoPIN) 1 mg tablet         ATTENTION:   This medical record was transcribed using an electronic medical records system. Although proofread, it may and can contain electronic and spelling errors. Other human spelling and other errors may be present. Corrections may be executed at a later time. Please feel free to contact us for any clarifications as needed. Follow-up and Dispositions    · Return in about 6 months (around 1/5/2023) for Physical exam with fasting labs. Signed,  Ambrosio Ingram.  Viry Clancy, MSN APRN FNP-BC

## 2022-07-05 NOTE — PROGRESS NOTES
Ashley Quintanilla is a 45 y.o. male     Chief Complaint   Patient presents with    Hypertension     follow up       Visit Vitals  /76 (BP 1 Location: Left arm, BP Patient Position: Sitting, BP Cuff Size: Adult)   Pulse 74   Temp 97.7 °F (36.5 °C) (Oral)   Resp 16   Ht 5' 10\" (1.778 m)   Wt 290 lb 9.6 oz (131.8 kg)   SpO2 97%   BMI 41.70 kg/m²       Health Maintenance Due   Topic Date Due    Hepatitis C Screening  Never done    Shingrix Vaccine Age 50> (1 of 2) Never done    Depression Monitoring  06/16/2022         1. \"Have you been to the ER, urgent care clinic since your last visit? Hospitalized since your last visit? \" No    2. \"Have you seen or consulted any other health care providers outside of the [address] [place] [address] since your last visit? \" No     3. For patients aged 39-70: Has the patient had a colonoscopy / FIT/ Cologuard? Yes - no Care Gap present      If the patient is female:    4. For patients aged 41-77: Has the patient had a mammogram within the past 2 years? NA - based on age or sex      11. For patients aged 21-65: Has the patient had a pap smear?  NA - based on age or sex

## 2022-07-06 DIAGNOSIS — F41.9 ANXIETY: ICD-10-CM

## 2022-07-06 RX ORDER — CLONAZEPAM 1 MG/1
TABLET ORAL
Qty: 90 TABLET | Refills: 1 | Status: SHIPPED | OUTPATIENT
Start: 2022-07-06

## 2022-07-06 NOTE — TELEPHONE ENCOUNTER
PCP: Ki Cannon NP    Last appt: 7/5/2022  Future Appointments   Date Time Provider Anant Vaca   7/11/2022  8:40 AM LAB ONLY [facility]   1/6/2023  8:30 AM Mary Beasley NP [facility]       Requested Prescriptions     Pending Prescriptions Disp Refills    clonazePAM (KlonoPIN) 1 mg tablet 90 Tablet 1     Sig: TAKE 1 TABLET BY MOUTH EACH NIGHT AT BEDTIME.          Other Comments:

## 2022-07-11 ENCOUNTER — APPOINTMENT (OUTPATIENT)
Dept: INTERNAL MEDICINE CLINIC | Age: 52
End: 2022-07-11

## 2022-07-11 DIAGNOSIS — I10 ESSENTIAL HYPERTENSION: ICD-10-CM

## 2022-07-11 DIAGNOSIS — E78.2 MIXED HYPERLIPIDEMIA: ICD-10-CM

## 2022-07-11 DIAGNOSIS — Z79.899 ON STATIN THERAPY: ICD-10-CM

## 2022-07-11 LAB
ALBUMIN SERPL-MCNC: 3.7 G/DL (ref 3.5–5)
ALBUMIN/GLOB SERPL: 1.3 {RATIO} (ref 1.1–2.2)
ALP SERPL-CCNC: 117 U/L (ref 45–117)
ALT SERPL-CCNC: 47 U/L (ref 12–78)
ANION GAP SERPL CALC-SCNC: 6 MMOL/L (ref 5–15)
AST SERPL-CCNC: 20 U/L (ref 15–37)
BILIRUB SERPL-MCNC: 0.3 MG/DL (ref 0.2–1)
BUN SERPL-MCNC: 14 MG/DL (ref 6–20)
BUN/CREAT SERPL: 13 (ref 12–20)
CALCIUM SERPL-MCNC: 9.1 MG/DL (ref 8.5–10.1)
CHLORIDE SERPL-SCNC: 107 MMOL/L (ref 97–108)
CHOLEST SERPL-MCNC: 103 MG/DL
CK SERPL-CCNC: 78 U/L (ref 39–308)
CO2 SERPL-SCNC: 26 MMOL/L (ref 21–32)
CREAT SERPL-MCNC: 1.07 MG/DL (ref 0.7–1.3)
GLOBULIN SER CALC-MCNC: 2.9 G/DL (ref 2–4)
GLUCOSE SERPL-MCNC: 113 MG/DL (ref 65–100)
HDLC SERPL-MCNC: 36 MG/DL
HDLC SERPL: 2.9 {RATIO} (ref 0–5)
LDLC SERPL CALC-MCNC: 42.8 MG/DL (ref 0–100)
POTASSIUM SERPL-SCNC: 4.1 MMOL/L (ref 3.5–5.1)
PROT SERPL-MCNC: 6.6 G/DL (ref 6.4–8.2)
SODIUM SERPL-SCNC: 139 MMOL/L (ref 136–145)
TRIGL SERPL-MCNC: 121 MG/DL (ref ?–150)
VLDLC SERPL CALC-MCNC: 24.2 MG/DL

## 2022-07-12 NOTE — PROGRESS NOTES
Cholesterol levels are in good shape. Continue atorvastatin 10 mg nightly. Metabolic panel is unremarkable. Continue all current medications and repeat labs in 6 months.

## 2022-08-22 RX ORDER — LOSARTAN POTASSIUM 100 MG/1
100 TABLET ORAL DAILY
Qty: 90 TABLET | Refills: 1 | Status: SHIPPED | OUTPATIENT
Start: 2022-08-22

## 2022-08-22 RX ORDER — LOSARTAN POTASSIUM 100 MG/1
TABLET ORAL
Qty: 90 TABLET | Refills: 1 | Status: SHIPPED | OUTPATIENT
Start: 2022-08-22 | End: 2022-08-22 | Stop reason: SDUPTHER

## 2022-08-22 NOTE — TELEPHONE ENCOUNTER
PCP: Jazzy Coronado NP    Last appt: 7/5/2022  Future Appointments   Date Time Provider Anant Vaca   1/6/2023  8:30 AM Jazzy Coronado NP [facility]       Requested Prescriptions     Pending Prescriptions Disp Refills    losartan (COZAAR) 100 mg tablet 90 Tablet 1     Sig: Take 1 Tablet by mouth daily.        Prior labs and Blood pressures:  BP Readings from Last 3 Encounters:   07/05/22 130/76   12/06/21 122/85   06/16/21 126/76     Lab Results   Component Value Date/Time    Sodium 139 07/11/2022 08:48 AM    Potassium 4.1 07/11/2022 08:48 AM    Chloride 107 07/11/2022 08:48 AM    CO2 26 07/11/2022 08:48 AM    Anion gap 6 07/11/2022 08:48 AM    Glucose 113 (H) 07/11/2022 08:48 AM    BUN 14 07/11/2022 08:48 AM    Creatinine 1.07 07/11/2022 08:48 AM    BUN/Creatinine ratio 13 07/11/2022 08:48 AM    GFR est AA >60 07/11/2022 08:48 AM    GFR est non-AA >60 07/11/2022 08:48 AM    Calcium 9.1 07/11/2022 08:48 AM     Lab Results   Component Value Date/Time    Hemoglobin A1c 5.4 12/06/2021 09:19 AM    Hemoglobin A1c (POC) 5.7 11/01/2018 08:50 AM     Lab Results   Component Value Date/Time    Cholesterol, total 103 07/11/2022 08:48 AM    Cholesterol (POC) 97.0 11/01/2018 08:50 AM    HDL Cholesterol 36 07/11/2022 08:48 AM    HDL Cholesterol (POC) 38.0 11/01/2018 08:50 AM    LDL Cholesterol (POC) 36.0 11/01/2018 08:50 AM    LDL, calculated 42.8 07/11/2022 08:48 AM    VLDL, calculated 24.2 07/11/2022 08:48 AM    Triglyceride 121 07/11/2022 08:48 AM    Triglycerides (POC) 115.0 11/01/2018 08:50 AM    CHOL/HDL Ratio 2.9 07/11/2022 08:48 AM     Lab Results   Component Value Date/Time    VITAMIN D, 25-HYDROXY 42 09/17/2020 08:54 AM       Lab Results   Component Value Date/Time    TSH 2.33 12/06/2021 09:19 AM    TSH, 3rd generation 1.82 09/17/2020 08:54 AM

## 2022-11-23 RX ORDER — ATORVASTATIN CALCIUM 10 MG/1
TABLET, FILM COATED ORAL
Qty: 90 TABLET | Refills: 1 | Status: SHIPPED | OUTPATIENT
Start: 2022-11-23

## 2023-01-03 DIAGNOSIS — F41.9 ANXIETY: ICD-10-CM

## 2023-01-03 RX ORDER — CLONAZEPAM 1 MG/1
TABLET ORAL
Qty: 90 TABLET | Refills: 1 | Status: SHIPPED | OUTPATIENT
Start: 2023-01-03

## 2023-01-06 ENCOUNTER — TELEPHONE (OUTPATIENT)
Dept: INTERNAL MEDICINE CLINIC | Age: 53
End: 2023-01-06

## 2023-01-06 ENCOUNTER — OFFICE VISIT (OUTPATIENT)
Dept: INTERNAL MEDICINE CLINIC | Age: 53
End: 2023-01-06
Payer: COMMERCIAL

## 2023-01-06 VITALS
RESPIRATION RATE: 16 BRPM | OXYGEN SATURATION: 97 % | TEMPERATURE: 98.3 F | HEIGHT: 70 IN | HEART RATE: 87 BPM | WEIGHT: 297 LBS | BODY MASS INDEX: 42.52 KG/M2 | DIASTOLIC BLOOD PRESSURE: 74 MMHG | SYSTOLIC BLOOD PRESSURE: 126 MMHG

## 2023-01-06 DIAGNOSIS — E78.2 MIXED HYPERLIPIDEMIA: ICD-10-CM

## 2023-01-06 DIAGNOSIS — F41.9 ANXIETY: ICD-10-CM

## 2023-01-06 DIAGNOSIS — F32.0 CURRENT MILD EPISODE OF MAJOR DEPRESSIVE DISORDER WITHOUT PRIOR EPISODE (HCC): ICD-10-CM

## 2023-01-06 DIAGNOSIS — G47.33 OSA ON CPAP: ICD-10-CM

## 2023-01-06 DIAGNOSIS — Z79.899 LONG-TERM CURRENT USE OF BENZODIAZEPINE: ICD-10-CM

## 2023-01-06 DIAGNOSIS — Z12.5 SCREENING FOR PROSTATE CANCER: ICD-10-CM

## 2023-01-06 DIAGNOSIS — Z99.89 OSA ON CPAP: ICD-10-CM

## 2023-01-06 DIAGNOSIS — Z79.899 CONTROLLED SUBSTANCE AGREEMENT SIGNED: ICD-10-CM

## 2023-01-06 DIAGNOSIS — R53.83 FATIGUE, UNSPECIFIED TYPE: ICD-10-CM

## 2023-01-06 DIAGNOSIS — I10 ESSENTIAL HYPERTENSION: Primary | ICD-10-CM

## 2023-01-06 DIAGNOSIS — Z79.899 ON STATIN THERAPY: ICD-10-CM

## 2023-01-06 PROCEDURE — 99214 OFFICE O/P EST MOD 30 MIN: CPT | Performed by: NURSE PRACTITIONER

## 2023-01-06 PROCEDURE — 3074F SYST BP LT 130 MM HG: CPT | Performed by: NURSE PRACTITIONER

## 2023-01-06 PROCEDURE — 3078F DIAST BP <80 MM HG: CPT | Performed by: NURSE PRACTITIONER

## 2023-01-06 NOTE — PROGRESS NOTES
Ashley Quintanilla is a 45 y.o. male     Chief Complaint   Patient presents with    Hypertension     6M       Visit Vitals  /74 (BP 1 Location: Left upper arm, BP Patient Position: Sitting, BP Cuff Size: Adult)   Pulse 87   Temp 98.3 °F (36.8 °C) (Oral)   Resp 16   Ht 5' 10\" (1.778 m)   Wt 297 lb (134.7 kg)   SpO2 97%   BMI 42.62 kg/m²       Health Maintenance Due   Topic Date Due    Shingles Vaccine (1 of 2) Never done         1. \"Have you been to the ER, urgent care clinic since your last visit? Hospitalized since your last visit? \" No    2. \"Have you seen or consulted any other health care providers outside of the [address] [place] [address] since your last visit? \" No     3. For patients aged 39-70: Has the patient had a colonoscopy / FIT/ Cologuard? Yes - no Care Gap present      If the patient is female:    4. For patients aged 41-77: Has the patient had a mammogram within the past 2 years? NA - based on age or sex      11. For patients aged 21-65: Has the patient had a pap smear?  NA - based on age or sex

## 2023-01-06 NOTE — PROGRESS NOTES
Chief Complaint   Patient presents with    Hypertension     4M       SUBJECTIVE:    Levi Cruz is a 45 y.o. male who is here today for a follow up appointment regarding current conditions and management of essential hypertension, hyperlipidemia, depression, anxiety, obesity, TYRESE with CPAP, and severe fatigue. The patient continues to take medication for management of his hypertension. His blood pressure appears well controlled and stable at this time. He denies any adverse side effects of the medication. He is currently on atorvastatin daily for management of his cholesterol and tolerating this well. He denies any recent episodes of chest pain, chest pressure, shortness of breath, headaches, dizziness, blurred vision, palpitations, or syncope episodes. He continues to take Wellbutrin XL daily for management of his depression and clonazepam as needed for his anxiety. He states the medications are effective and his symptoms are well controlled at this time. He denies any adverse side effects to the medication. Patient also suffers from obstructive sleep apnea and currently uses a CPAP machine for this. He states he is compliant with use. In addition, he states he has been having a great deal of fatigue and worsening obesity. He is interested in possible dietary recommendations, and has been studying certain diets that he has come across via the Internet. He also admits to poor exercise patterns which are sporadic at best, and has not been maintaining regular exercise patterns for some time now.     Current Outpatient Medications   Medication Sig Dispense Refill    clonazePAM (KlonoPIN) 1 mg tablet TAKE 1 TABLET BY MOUTH EACH NIGHT AT BEDTIME 90 Tablet 1    amLODIPine (NORVASC) 5 mg tablet TAKE 1 TABLET BY MOUTH EVERY DAY FOR BLOOD PRESSURE 90 Tablet 1    atorvastatin (LIPITOR) 10 mg tablet TAKE 1 TABLET BY MOUTH EVERY DAY 90 Tablet 1    buPROPion XL (WELLBUTRIN XL) 300 mg XL tablet TAKE 1 TABLET BY MOUTH EVERY DAY 90 Tablet 1    losartan (COZAAR) 100 mg tablet Take 1 Tablet by mouth daily. 90 Tablet 1    albuterol (PROVENTIL HFA, VENTOLIN HFA, PROAIR HFA) 90 mcg/actuation inhaler Take 2 Puffs by inhalation every four (4) hours as needed for Wheezing. cholecalciferol, vitamin D3, 50 mcg (2,000 unit) tab Take 2,000 Units by mouth daily. multivitamin (ONE A DAY) tablet Take 1 Tab by mouth daily. sodium chloride-aloe vera (AYR) topical gel Apply  to affected area once. aspirin delayed-release 81 mg tablet Take 81 mg by mouth daily. Cetirizine 10 mg cap Take  by mouth daily as needed.  (Patient not taking: Reported on 1/6/2023)       Past Medical History:   Diagnosis Date    Arthritis     Asthma 8/2/2017    BMI 40.0-44.9, adult ([facility] [address] 8/2/2017    Depression 8/2/2017    Deviated nasal septum 8/2/2017    Essential hypertension 8/3/2017    Fatty liver 8/2/2017    Hyperlipidemia 8/2/2017    Hypertension     IGT (impaired glucose tolerance) 8/2/2017    Insomnia 8/2/2017    Knee pain, left 8/2/2017    Metatarsalgia 8/2/2017    TYRESE on CPAP 8/2/2017    Plantar fasciitis 8/2/2017    Psychiatric disorder     anxiety and depression    Sleep apnea     cpap    Vitamin D deficiency 8/2/2017     Past Surgical History:   Procedure Laterality Date    HX KNEE ARTHROSCOPY Left     med menisectomy    HX ORTHOPAEDIC  1999 2006    bilateral shoulder decompressions    HX REFRACTIVE SURGERY Bilateral 2006     Allergies   Allergen Reactions    Lisinopril Unknown (comments)       REVIEW OF SYSTEMS:                                        POSITIVE= bold text  Negative = regular text    General:                     fever, chills, sweats, generalized fatigue, weight loss/gain,                                       loss of appetite   Eyes:                           blurred vision, eye pain, loss of vision, double vision  ENT:                            rhinorrhea, pharyngitis   Respiratory:               cough, sputum production, SOB, DAN, wheezing, pleuritic pain   Cardiology:                chest pain, palpitations, orthopnea, PND, edema, syncope   Gastrointestinal:       abdominal pain , N/V, diarrhea, dysphagia, constipation, bleeding   Genitourinary:           frequency, urgency, dysuria, hematuria, incontinence   Muskuloskeletal :      arthralgia, myalgia, back pain  Hematology:              easy bruising, nose or gum bleeding, lymphadenopathy   Dermatological:         rash, ulceration, pruritis, color change / jaundice  Endocrine:                 hot flashes or polydipsia   Neurological:             headache, dizziness, confusion, focal weakness, paresthesia,                                      Speech difficulties, memory loss, gait difficulty  Psychological:          Feelings of anxiety, depression, agitation        Social History     Socioeconomic History    Marital status: SINGLE   Tobacco Use    Smoking status: Never    Smokeless tobacco: Never   Vaping Use    Vaping Use: Never used   Substance and Sexual Activity    Alcohol use: Yes     Comment: 3 - 4 drinks / wk    Drug use: Never    Sexual activity: Yes     Birth control/protection: Condom     Social Determinants of Health     Financial Resource Strain: Low Risk     Difficulty of Paying Living Expenses: Not hard at all   Food Insecurity: No Food Insecurity    Worried About Running Out of Food in the Last Year: Never true    Ran Out of Food in the Last Year: Never true     Family History   Problem Relation Age of Onset    High Cholesterol Mother     Cancer Father         lung    Other Father         lymphoma       OBJECTIVE:     Visit Vitals  /74 (BP 1 Location: Left upper arm, BP Patient Position: Sitting, BP Cuff Size: Adult)   Pulse 87   Temp 98.3 °F (36.8 °C) (Oral)   Resp 16   Ht 5' 10\" (1.778 m)   Wt 297 lb (134.7 kg)   SpO2 97%   BMI 42.62 kg/m²       Constitutional: He appears well nourished, of stated age, and dressed appropriately.   Eyes: Sclera anicteric, PERRLA, EOMI  Neck: Supple without lymphadenopathy. Thyroid normal, No JVD or bruits  Respiratory: Clear to ascultation X5, normal inspiratory effort, no adventitious breath sounds. Cardiovascular: Regular rate and rhythm, no rubs or gallops, PMI not displaced, No thrills, no peripheral edema  Neuro: Non-focal exam, A & O X 3.   Psychiatric: Appropriate affect and demeanor, pleasant and cooperative. Patient's thought content and thought processing appear to be within normal limits. ASSESSMENT/PLAN:     ICD-10-CM ICD-9-CM    1. Essential hypertension  I10 401.9 CBC W/O DIFF      METABOLIC PANEL, COMPREHENSIVE      TSH 3RD GENERATION      T4, FREE      T4, FREE      TSH 3RD GENERATION      METABOLIC PANEL, COMPREHENSIVE      CBC W/O DIFF      2. Mixed hyperlipidemia  E78.2 272.2 LIPID PANEL      LIPID PANEL      3. BMI 40.0-44.9, [facility]  Z68.41 V85.41       4. Fatigue, unspecified type  R53.83 780.79 TSH 3RD GENERATION      T4, FREE      T4, FREE      TSH 3RD GENERATION      5. Current mild episode of major depressive disorder without prior episode (HCC)  F32.0 296.21       6. Anxiety  F41.9 300.00       7. TYRESE on CPAP  G47.33 327.23     Z99.89 V46.8       8. Screening for prostate cancer  Z12.5 V76.44 PSA SCREENING (SCREENING)      PSA SCREENING (SCREENING)      9. Long-term current use of benzodiazepine  Z79.899 V58.69       10. Controlled substance agreement signed  Z79.899 V58.69       11. On statin therapy  Z79.899 V58.69 CK      CK        1: We will repeat labs today including: CBC, CMP, lipid panel, CK, PSA, TSH, and free T4.  2: Patient to return in the next 1 to 2 weeks for nonfasting testosterone level checked. As explained, this may be a contributing factor to his fatigue and weight gain. 3: Patient to continue current medication for management of hypertension. Blood pressure appears stable and well controlled. 4: Continue atorvastatin daily for management of cholesterol. Patient tolerating well. 5: Continue Wellbutrin for management of depression and clonazepam as needed for anxiety. 6: Patient to work on healthy lifestyle management and appropriate dietary intake. Recommended weight watchers or general diabetic diet for improved weight management. I have also suggested referral to dietitian. Patient will await lab results before making decision. 7: Continue all other over-the-counter supplements and medications as prescribed. 8: Encourage patient to do regular walking the majority of the days of the week. 9: Follow-up pending lab results. Patient states understanding and agrees with plan. Orders Placed This Encounter    CBC W/O DIFF    METABOLIC PANEL, COMPREHENSIVE    LIPID PANEL    CK    PSA SCREENING (SCREENING)    TSH 3RD GENERATION    T4, FREE         ATTENTION:   This medical record was transcribed using an electronic medical records system. Although proofread, it may and can contain electronic and spelling errors. Other human spelling and other errors may be present. Corrections may be executed at a later time. Please feel free to contact us for any clarifications as needed. Signed,  Blair Sutherland.  Jamey Coon, MSN APRN FNP-BC

## 2023-01-07 LAB
ALBUMIN SERPL-MCNC: 4.2 G/DL (ref 3.5–5)
ALBUMIN/GLOB SERPL: 1.4 {RATIO} (ref 1.1–2.2)
ALP SERPL-CCNC: 137 U/L (ref 45–117)
ALT SERPL-CCNC: 48 U/L (ref 12–78)
ANION GAP SERPL CALC-SCNC: 5 MMOL/L (ref 5–15)
AST SERPL-CCNC: 23 U/L (ref 15–37)
BILIRUB SERPL-MCNC: 0.4 MG/DL (ref 0.2–1)
BUN SERPL-MCNC: 15 MG/DL (ref 6–20)
BUN/CREAT SERPL: 12 (ref 12–20)
CALCIUM SERPL-MCNC: 9.3 MG/DL (ref 8.5–10.1)
CHLORIDE SERPL-SCNC: 107 MMOL/L (ref 97–108)
CHOLEST SERPL-MCNC: 117 MG/DL
CK SERPL-CCNC: 86 U/L (ref 39–308)
CO2 SERPL-SCNC: 28 MMOL/L (ref 21–32)
CREAT SERPL-MCNC: 1.26 MG/DL (ref 0.7–1.3)
ERYTHROCYTE [DISTWIDTH] IN BLOOD BY AUTOMATED COUNT: 15.1 % (ref 11.5–14.5)
GLOBULIN SER CALC-MCNC: 3.1 G/DL (ref 2–4)
GLUCOSE SERPL-MCNC: 101 MG/DL (ref 65–100)
HCT VFR BLD AUTO: 50.3 % (ref 36.6–50.3)
HDLC SERPL-MCNC: 31 MG/DL
HDLC SERPL: 3.8 {RATIO} (ref 0–5)
HGB BLD-MCNC: 15.9 G/DL (ref 12.1–17)
LDLC SERPL CALC-MCNC: 54.6 MG/DL (ref 0–100)
MCH RBC QN AUTO: 27.6 PG (ref 26–34)
MCHC RBC AUTO-ENTMCNC: 31.6 G/DL (ref 30–36.5)
MCV RBC AUTO: 87.3 FL (ref 80–99)
NRBC # BLD: 0 K/UL (ref 0–0.01)
NRBC BLD-RTO: 0 PER 100 WBC
PLATELET # BLD AUTO: 243 K/UL (ref 150–400)
PMV BLD AUTO: 10.7 FL (ref 8.9–12.9)
POTASSIUM SERPL-SCNC: 4.4 MMOL/L (ref 3.5–5.1)
PROT SERPL-MCNC: 7.3 G/DL (ref 6.4–8.2)
PSA SERPL-MCNC: 0.4 NG/ML (ref 0.01–4)
RBC # BLD AUTO: 5.76 M/UL (ref 4.1–5.7)
SODIUM SERPL-SCNC: 140 MMOL/L (ref 136–145)
T4 FREE SERPL-MCNC: 1.2 NG/DL (ref 0.8–1.5)
TRIGL SERPL-MCNC: 157 MG/DL (ref ?–150)
TSH SERPL DL<=0.05 MIU/L-ACNC: 1.75 UIU/ML (ref 0.36–3.74)
VLDLC SERPL CALC-MCNC: 31.4 MG/DL
WBC # BLD AUTO: 6.4 K/UL (ref 4.1–11.1)

## 2023-01-09 NOTE — PROGRESS NOTES
Thyroid level in normal range. Prostate level is in healthy range. CK level is normal.    Cholesterol levels look good overall. Please continue atorvastatin 10 mg daily. Metabolic panel is unremarkable. Blood counts are acceptable.

## 2023-01-13 ENCOUNTER — LAB ONLY (OUTPATIENT)
Dept: INTERNAL MEDICINE CLINIC | Age: 53
End: 2023-01-13

## 2023-01-13 DIAGNOSIS — R53.83 FATIGUE, UNSPECIFIED TYPE: ICD-10-CM

## 2023-01-15 LAB — TESTOST SERPL-MCNC: 313 NG/DL (ref 264–916)

## 2023-02-12 RX ORDER — BUPROPION HYDROCHLORIDE 300 MG/1
TABLET ORAL
Qty: 90 TABLET | Refills: 1 | Status: SHIPPED | OUTPATIENT
Start: 2023-02-12

## 2023-04-03 DIAGNOSIS — F41.9 ANXIETY: ICD-10-CM

## 2023-04-03 DIAGNOSIS — I10 ESSENTIAL HYPERTENSION: ICD-10-CM

## 2023-04-03 RX ORDER — AMLODIPINE BESYLATE 5 MG/1
5 TABLET ORAL DAILY
Qty: 90 TABLET | Refills: 1 | Status: SHIPPED | OUTPATIENT
Start: 2023-04-03

## 2023-04-03 RX ORDER — CLONAZEPAM 1 MG/1
TABLET ORAL
Qty: 90 TABLET | Refills: 1 | Status: SHIPPED | OUTPATIENT
Start: 2023-04-03

## 2023-04-03 NOTE — TELEPHONE ENCOUNTER
PCP: Richard Nice NP    Last appt: 1/6/2023  Future Appointments   Date Time Provider Anant Vaca   7/13/2023  8:30 AM Richard Nice NP [facility] AMB       Requested Prescriptions     Pending Prescriptions Disp Refills    amLODIPine (NORVASC) 5 mg tablet 90 Tablet 1     Sig: Take 1 Tablet by mouth daily.     clonazePAM (KlonoPIN) 1 mg tablet 90 Tablet 1     Sig: TAKE 1 TABLET BY MOUTH EACH NIGHT AT BEDTIME       Prior labs and Blood pressures:  BP Readings from Last 3 Encounters:   01/06/23 126/74   07/05/22 130/76   12/06/21 122/85     Lab Results   Component Value Date/Time    Sodium 140 01/06/2023 09:05 AM    Potassium 4.4 01/06/2023 09:05 AM    Chloride 107 01/06/2023 09:05 AM    CO2 28 01/06/2023 09:05 AM    Anion gap 5 01/06/2023 09:05 AM    Glucose 101 (H) 01/06/2023 09:05 AM    BUN 15 01/06/2023 09:05 AM    Creatinine 1.26 01/06/2023 09:05 AM    BUN/Creatinine ratio 12 01/06/2023 09:05 AM    GFR est AA >60 07/11/2022 08:48 AM    GFR est non-AA >60 07/11/2022 08:48 AM    Calcium 9.3 01/06/2023 09:05 AM     Lab Results   Component Value Date/Time    Hemoglobin A1c 5.4 12/06/2021 09:19 AM    Hemoglobin A1c (POC) 5.7 11/01/2018 08:50 AM     Lab Results   Component Value Date/Time    Cholesterol, total 117 01/06/2023 09:05 AM    Cholesterol (POC) 97.0 11/01/2018 08:50 AM    HDL Cholesterol 31 01/06/2023 09:05 AM    HDL Cholesterol (POC) 38.0 11/01/2018 08:50 AM    LDL Cholesterol (POC) 36.0 11/01/2018 08:50 AM    LDL, calculated 54.6 01/06/2023 09:05 AM    VLDL, calculated 31.4 01/06/2023 09:05 AM    Triglyceride 157 (H) 01/06/2023 09:05 AM    Triglycerides (POC) 115.0 11/01/2018 08:50 AM    CHOL/HDL Ratio 3.8 01/06/2023 09:05 AM     Lab Results   Component Value Date/Time    VITAMIN D, 25-HYDROXY 42 09/17/2020 08:54 AM       Lab Results   Component Value Date/Time    TSH 1.75 01/06/2023 09:05 AM    TSH, 3rd generation 1.82 09/17/2020 08:54 AM

## 2023-05-15 RX ORDER — ATORVASTATIN CALCIUM 10 MG/1
TABLET, FILM COATED ORAL
Qty: 90 TABLET | Refills: 1 | Status: SHIPPED | OUTPATIENT
Start: 2023-05-15

## 2023-05-15 RX ORDER — LOSARTAN POTASSIUM 100 MG/1
TABLET ORAL
Qty: 90 TABLET | Refills: 1 | Status: SHIPPED | OUTPATIENT
Start: 2023-05-15

## 2023-05-15 NOTE — TELEPHONE ENCOUNTER
Last Refill: Lipitor 11-23-22, Losartan 8-22-22  Last Visit: 1/6/2023   Next Visit: 7/13/2023     Requested Prescriptions     Pending Prescriptions Disp Refills    atorvastatin (LIPITOR) 10 MG tablet [Pharmacy Med Name: ATORVASTATIN 10 MG TABLET] 90 tablet 1     Sig: TAKE 1 TABLET BY MOUTH EVERY DAY    losartan (COZAAR) 100 MG tablet [Pharmacy Med Name: LOSARTAN POTASSIUM 100 MG TAB] 90 tablet 1     Sig: TAKE 1 TABLET BY MOUTH EVERY DAY

## 2023-07-06 DIAGNOSIS — F41.1 GENERALIZED ANXIETY DISORDER: Primary | ICD-10-CM

## 2023-07-06 RX ORDER — CLONAZEPAM 1 MG/1
TABLET ORAL
Qty: 90 TABLET | Refills: 0 | Status: SHIPPED | OUTPATIENT
Start: 2023-07-06 | End: 2023-10-06

## 2023-07-06 NOTE — TELEPHONE ENCOUNTER
PCP: Duard Bloch, APRN - NP    Last appt: 1/16/23  Future Appointments   Date Time Provider [address]   7/13/2023  8:30 AM Duard Bloch, APRN - NP [facility] AMB       Last refilled:4/3/23    Requested Prescriptions     Pending Prescriptions Disp Refills    clonazePAM (KLONOPIN) 1 MG tablet 60 tablet 0     Sig: TAKE 1 TABLET BY MOUTH EACH NIGHT AT BEDTIME

## 2023-07-06 NOTE — TELEPHONE ENCOUNTER
Disp Refills Start End    clonazePAM (KlonoPIN) 1 mg tablet 90 Tablet 1 4/3/2023     Sig: TAKE 1 TABLET BY MOUTH EACH NIGHT AT BEDTIME      Last visit: 1/6/23  Future visit: 7/13/23    Pharmacy:  [facility]

## 2023-07-10 SDOH — ECONOMIC STABILITY: TRANSPORTATION INSECURITY
IN THE PAST 12 MONTHS, HAS LACK OF TRANSPORTATION KEPT YOU FROM MEETINGS, WORK, OR FROM GETTING THINGS NEEDED FOR DAILY LIVING?: NO

## 2023-07-10 SDOH — ECONOMIC STABILITY: FOOD INSECURITY: WITHIN THE PAST 12 MONTHS, YOU WORRIED THAT YOUR FOOD WOULD RUN OUT BEFORE YOU GOT MONEY TO BUY MORE.: NEVER TRUE

## 2023-07-10 SDOH — ECONOMIC STABILITY: HOUSING INSECURITY
IN THE LAST 12 MONTHS, WAS THERE A TIME WHEN YOU DID NOT HAVE A STEADY PLACE TO SLEEP OR SLEPT IN A SHELTER (INCLUDING NOW)?: NO

## 2023-07-10 SDOH — ECONOMIC STABILITY: FOOD INSECURITY: WITHIN THE PAST 12 MONTHS, THE FOOD YOU BOUGHT JUST DIDN'T LAST AND YOU DIDN'T HAVE MONEY TO GET MORE.: NEVER TRUE

## 2023-07-10 SDOH — ECONOMIC STABILITY: INCOME INSECURITY: HOW HARD IS IT FOR YOU TO PAY FOR THE VERY BASICS LIKE FOOD, HOUSING, MEDICAL CARE, AND HEATING?: NOT HARD AT ALL

## 2023-07-13 ENCOUNTER — OFFICE VISIT (OUTPATIENT)
Facility: CLINIC | Age: 53
End: 2023-07-13
Payer: COMMERCIAL

## 2023-07-13 VITALS
HEIGHT: 70 IN | TEMPERATURE: 98 F | BODY MASS INDEX: 36.68 KG/M2 | DIASTOLIC BLOOD PRESSURE: 72 MMHG | OXYGEN SATURATION: 97 % | SYSTOLIC BLOOD PRESSURE: 118 MMHG | HEART RATE: 77 BPM | RESPIRATION RATE: 16 BRPM | WEIGHT: 256.2 LBS

## 2023-07-13 DIAGNOSIS — I10 ESSENTIAL HYPERTENSION: ICD-10-CM

## 2023-07-13 DIAGNOSIS — G47.33 OSA ON CPAP: ICD-10-CM

## 2023-07-13 DIAGNOSIS — E55.9 VITAMIN D DEFICIENCY: ICD-10-CM

## 2023-07-13 DIAGNOSIS — E78.2 MIXED HYPERLIPIDEMIA: ICD-10-CM

## 2023-07-13 DIAGNOSIS — Z00.00 ROUTINE PHYSICAL EXAMINATION: Primary | ICD-10-CM

## 2023-07-13 DIAGNOSIS — F41.1 GENERALIZED ANXIETY DISORDER: ICD-10-CM

## 2023-07-13 DIAGNOSIS — Z79.899 ON STATIN THERAPY: ICD-10-CM

## 2023-07-13 DIAGNOSIS — F32.0 MAJOR DEPRESSIVE DISORDER, SINGLE EPISODE, MILD (HCC): ICD-10-CM

## 2023-07-13 DIAGNOSIS — Z99.89 OSA ON CPAP: ICD-10-CM

## 2023-07-13 DIAGNOSIS — E66.01 CLASS 2 SEVERE OBESITY DUE TO EXCESS CALORIES WITH SERIOUS COMORBIDITY AND BODY MASS INDEX (BMI) OF 36.0 TO 36.9 IN ADULT (HCC): ICD-10-CM

## 2023-07-13 LAB
25(OH)D3 SERPL-MCNC: 42.3 NG/ML (ref 30–100)
APPEARANCE UR: CLEAR
BILIRUB UR QL: NEGATIVE
CHOLEST SERPL-MCNC: 105 MG/DL
COLOR UR: NORMAL
ERYTHROCYTE [DISTWIDTH] IN BLOOD BY AUTOMATED COUNT: 14.6 % (ref 11.5–14.5)
GLUCOSE UR STRIP.AUTO-MCNC: NEGATIVE MG/DL
HCT VFR BLD AUTO: 49.4 % (ref 36.6–50.3)
HDLC SERPL-MCNC: 39 MG/DL
HDLC SERPL: 2.7 (ref 0–5)
HGB BLD-MCNC: 15.6 G/DL (ref 12.1–17)
HGB UR QL STRIP: NEGATIVE
KETONES UR QL STRIP.AUTO: NEGATIVE MG/DL
LDLC SERPL CALC-MCNC: 53.2 MG/DL (ref 0–100)
LEUKOCYTE ESTERASE UR QL STRIP.AUTO: NEGATIVE
MCH RBC QN AUTO: 28.4 PG (ref 26–34)
MCHC RBC AUTO-ENTMCNC: 31.6 G/DL (ref 30–36.5)
MCV RBC AUTO: 89.8 FL (ref 80–99)
NITRITE UR QL STRIP.AUTO: NEGATIVE
NRBC # BLD: 0 K/UL (ref 0–0.01)
NRBC BLD-RTO: 0 PER 100 WBC
PH UR STRIP: 6.5 (ref 5–8)
PLATELET # BLD AUTO: 204 K/UL (ref 150–400)
PMV BLD AUTO: 10.7 FL (ref 8.9–12.9)
PROT UR STRIP-MCNC: NEGATIVE MG/DL
RBC # BLD AUTO: 5.5 M/UL (ref 4.1–5.7)
SP GR UR REFRACTOMETRY: 1.02 (ref 1–1.03)
TRIGL SERPL-MCNC: 64 MG/DL
TSH SERPL DL<=0.05 MIU/L-ACNC: 1.6 UIU/ML (ref 0.36–3.74)
UROBILINOGEN UR QL STRIP.AUTO: 0.2 EU/DL (ref 0.2–1)
VLDLC SERPL CALC-MCNC: 12.8 MG/DL
WBC # BLD AUTO: 4.2 K/UL (ref 4.1–11.1)

## 2023-07-13 PROCEDURE — 93000 ELECTROCARDIOGRAM COMPLETE: CPT | Performed by: NURSE PRACTITIONER

## 2023-07-13 PROCEDURE — 99396 PREV VISIT EST AGE 40-64: CPT | Performed by: NURSE PRACTITIONER

## 2023-07-13 PROCEDURE — 3074F SYST BP LT 130 MM HG: CPT | Performed by: NURSE PRACTITIONER

## 2023-07-13 PROCEDURE — 3078F DIAST BP <80 MM HG: CPT | Performed by: NURSE PRACTITIONER

## 2023-07-13 SDOH — ECONOMIC STABILITY: FOOD INSECURITY: WITHIN THE PAST 12 MONTHS, YOU WORRIED THAT YOUR FOOD WOULD RUN OUT BEFORE YOU GOT MONEY TO BUY MORE.: NEVER TRUE

## 2023-07-13 SDOH — ECONOMIC STABILITY: INCOME INSECURITY: HOW HARD IS IT FOR YOU TO PAY FOR THE VERY BASICS LIKE FOOD, HOUSING, MEDICAL CARE, AND HEATING?: NOT HARD AT ALL

## 2023-07-13 SDOH — ECONOMIC STABILITY: FOOD INSECURITY: WITHIN THE PAST 12 MONTHS, THE FOOD YOU BOUGHT JUST DIDN'T LAST AND YOU DIDN'T HAVE MONEY TO GET MORE.: NEVER TRUE

## 2023-07-13 ASSESSMENT — PATIENT HEALTH QUESTIONNAIRE - PHQ9
3. TROUBLE FALLING OR STAYING ASLEEP: 0
8. MOVING OR SPEAKING SO SLOWLY THAT OTHER PEOPLE COULD HAVE NOTICED. OR THE OPPOSITE, BEING SO FIGETY OR RESTLESS THAT YOU HAVE BEEN MOVING AROUND A LOT MORE THAN USUAL: 0
4. FEELING TIRED OR HAVING LITTLE ENERGY: 0
1. LITTLE INTEREST OR PLEASURE IN DOING THINGS: 0
6. FEELING BAD ABOUT YOURSELF - OR THAT YOU ARE A FAILURE OR HAVE LET YOURSELF OR YOUR FAMILY DOWN: 0
SUM OF ALL RESPONSES TO PHQ QUESTIONS 1-9: 0
2. FEELING DOWN, DEPRESSED OR HOPELESS: 0
SUM OF ALL RESPONSES TO PHQ QUESTIONS 1-9: 0
SUM OF ALL RESPONSES TO PHQ QUESTIONS 1-9: 0
SUM OF ALL RESPONSES TO PHQ9 QUESTIONS 1 & 2: 0
10. IF YOU CHECKED OFF ANY PROBLEMS, HOW DIFFICULT HAVE THESE PROBLEMS MADE IT FOR YOU TO DO YOUR WORK, TAKE CARE OF THINGS AT HOME, OR GET ALONG WITH OTHER PEOPLE: 0
7. TROUBLE CONCENTRATING ON THINGS, SUCH AS READING THE NEWSPAPER OR WATCHING TELEVISION: 0
SUM OF ALL RESPONSES TO PHQ QUESTIONS 1-9: 0
9. THOUGHTS THAT YOU WOULD BE BETTER OFF DEAD, OR OF HURTING YOURSELF: 0
5. POOR APPETITE OR OVEREATING: 0

## 2023-07-14 LAB
ALBUMIN SERPL-MCNC: 3.9 G/DL (ref 3.5–5)
ALBUMIN/GLOB SERPL: 1.3 (ref 1.1–2.2)
ALP SERPL-CCNC: 145 U/L (ref 45–117)
ALT SERPL-CCNC: 32 U/L (ref 12–78)
ANION GAP SERPL CALC-SCNC: 5 MMOL/L (ref 5–15)
AST SERPL-CCNC: 19 U/L (ref 15–37)
BILIRUB SERPL-MCNC: 0.4 MG/DL (ref 0.2–1)
BUN SERPL-MCNC: 13 MG/DL (ref 6–20)
BUN/CREAT SERPL: 13 (ref 12–20)
CALCIUM SERPL-MCNC: 9 MG/DL (ref 8.5–10.1)
CHLORIDE SERPL-SCNC: 107 MMOL/L (ref 97–108)
CO2 SERPL-SCNC: 28 MMOL/L (ref 21–32)
CREAT SERPL-MCNC: 1 MG/DL (ref 0.7–1.3)
GLOBULIN SER CALC-MCNC: 2.9 G/DL (ref 2–4)
GLUCOSE SERPL-MCNC: 90 MG/DL (ref 65–100)
POTASSIUM SERPL-SCNC: 4.3 MMOL/L (ref 3.5–5.1)
PROT SERPL-MCNC: 6.8 G/DL (ref 6.4–8.2)
SODIUM SERPL-SCNC: 140 MMOL/L (ref 136–145)

## 2023-08-17 RX ORDER — BUPROPION HYDROCHLORIDE 300 MG/1
TABLET ORAL
Qty: 90 TABLET | Refills: 1 | Status: SHIPPED | OUTPATIENT
Start: 2023-08-17

## 2023-08-17 NOTE — TELEPHONE ENCOUNTER
PCP: SHARIFA Nieves NP    Last appt: 7/13/2023  Future Appointments   Date Time Provider [address]   1/16/2024  8:00 AM SHARIFA Nieves NP [facility] AMB       Requested Prescriptions     Pending Prescriptions Disp Refills    buPROPion (WELLBUTRIN XL) 300 MG extended release tablet [Pharmacy Med Name: BUPROPION HCL  MG TABLET] 90 tablet 1     Sig: TAKE 1 TABLET BY MOUTH EVERY DAY       Prior labs and Blood pressures:  BP Readings from Last 3 Encounters:   07/13/23 118/72   01/06/23 126/74   07/05/22 130/76     Lab Results   Component Value Date/Time     07/13/2023 09:47 AM    K 4.3 07/13/2023 09:47 AM     07/13/2023 09:47 AM    CO2 28 07/13/2023 09:47 AM    BUN 13 07/13/2023 09:47 AM    GFRAA >60 07/11/2022 08:48 AM     No results found for: HBA1C, [id number]  Lab Results   Component Value Date/Time    CHOL 105 07/13/2023 09:47 AM    HDL 39 07/13/2023 09:47 AM    VLDL 24 09/17/2020 08:55 AM     No results found for: VITD3, VD3RIA        Lab Results   Component Value Date/Time    TSH 1.75 01/06/2023 09:05 AM

## 2023-10-01 DIAGNOSIS — F41.1 GENERALIZED ANXIETY DISORDER: ICD-10-CM

## 2023-10-02 RX ORDER — CLONAZEPAM 1 MG/1
TABLET ORAL
Qty: 90 TABLET | Refills: 0 | Status: SHIPPED | OUTPATIENT
Start: 2023-10-02 | End: 2024-01-01

## 2023-10-02 NOTE — TELEPHONE ENCOUNTER
PCP: SHARIFA Fletcher NP    Last appt: 7/13/2023    Future Appointments   Date Time Provider [address]   1/16/2024  8:00 AM SHARIFA Fletcher NP [facility] AMB       Requested Prescriptions     Pending Prescriptions Disp Refills    clonazePAM (KLONOPIN) 1 MG tablet 90 tablet 0     Sig: TAKE 1 TABLET BY MOUTH EACH NIGHT AT BEDTIME

## 2023-11-06 ENCOUNTER — TELEPHONE (OUTPATIENT)
Facility: CLINIC | Age: 53
End: 2023-11-06

## 2023-11-07 ENCOUNTER — OFFICE VISIT (OUTPATIENT)
Facility: CLINIC | Age: 53
End: 2023-11-07
Payer: COMMERCIAL

## 2023-11-07 VITALS
WEIGHT: 272 LBS | DIASTOLIC BLOOD PRESSURE: 72 MMHG | BODY MASS INDEX: 38.94 KG/M2 | HEART RATE: 96 BPM | TEMPERATURE: 98.1 F | HEIGHT: 70 IN | OXYGEN SATURATION: 97 % | SYSTOLIC BLOOD PRESSURE: 130 MMHG | RESPIRATION RATE: 16 BRPM

## 2023-11-07 DIAGNOSIS — J06.9 UPPER RESPIRATORY TRACT INFECTION, UNSPECIFIED TYPE: ICD-10-CM

## 2023-11-07 DIAGNOSIS — H66.90 ACUTE OTITIS MEDIA, UNSPECIFIED OTITIS MEDIA TYPE: Primary | ICD-10-CM

## 2023-11-07 PROCEDURE — 3078F DIAST BP <80 MM HG: CPT | Performed by: NURSE PRACTITIONER

## 2023-11-07 PROCEDURE — 3075F SYST BP GE 130 - 139MM HG: CPT | Performed by: NURSE PRACTITIONER

## 2023-11-07 PROCEDURE — 99213 OFFICE O/P EST LOW 20 MIN: CPT | Performed by: NURSE PRACTITIONER

## 2023-11-07 RX ORDER — METHYLPREDNISOLONE 4 MG/1
TABLET ORAL
Qty: 1 KIT | Refills: 0 | Status: SHIPPED | OUTPATIENT
Start: 2023-11-07

## 2023-11-07 RX ORDER — AMOXICILLIN AND CLAVULANATE POTASSIUM 875; 125 MG/1; MG/1
1 TABLET, FILM COATED ORAL 2 TIMES DAILY
Qty: 14 TABLET | Refills: 0 | Status: SHIPPED | OUTPATIENT
Start: 2023-11-07 | End: 2023-11-14

## 2023-11-30 ENCOUNTER — OFFICE VISIT (OUTPATIENT)
Facility: CLINIC | Age: 53
End: 2023-11-30
Payer: COMMERCIAL

## 2023-11-30 VITALS
BODY MASS INDEX: 39.91 KG/M2 | DIASTOLIC BLOOD PRESSURE: 84 MMHG | SYSTOLIC BLOOD PRESSURE: 130 MMHG | HEART RATE: 93 BPM | HEIGHT: 70 IN | TEMPERATURE: 98 F | OXYGEN SATURATION: 97 % | WEIGHT: 278.8 LBS

## 2023-11-30 DIAGNOSIS — J32.9 RHINOSINUSITIS: ICD-10-CM

## 2023-11-30 DIAGNOSIS — H69.93 DYSFUNCTION OF BOTH EUSTACHIAN TUBES: ICD-10-CM

## 2023-11-30 DIAGNOSIS — J30.89 ENVIRONMENTAL AND SEASONAL ALLERGIES: Primary | ICD-10-CM

## 2023-11-30 PROCEDURE — 3079F DIAST BP 80-89 MM HG: CPT | Performed by: NURSE PRACTITIONER

## 2023-11-30 PROCEDURE — 3075F SYST BP GE 130 - 139MM HG: CPT | Performed by: NURSE PRACTITIONER

## 2023-11-30 PROCEDURE — 99213 OFFICE O/P EST LOW 20 MIN: CPT | Performed by: NURSE PRACTITIONER

## 2023-11-30 RX ORDER — CETIRIZINE HYDROCHLORIDE 10 MG/1
10 TABLET ORAL DAILY
Qty: 90 TABLET | Refills: 1 | Status: SHIPPED | OUTPATIENT
Start: 2023-11-30

## 2023-11-30 RX ORDER — FLUTICASONE PROPIONATE 50 MCG
2 SPRAY, SUSPENSION (ML) NASAL DAILY
Qty: 16 G | Refills: 3 | Status: SHIPPED | OUTPATIENT
Start: 2023-11-30

## 2023-11-30 RX ORDER — METHYLPREDNISOLONE 4 MG/1
TABLET ORAL
Qty: 1 KIT | Refills: 0 | Status: SHIPPED | OUTPATIENT
Start: 2023-11-30

## 2023-11-30 NOTE — PROGRESS NOTES
Chief Complaint   Patient presents with    Sinus Problem     Sinus infection, still hard to breathe and still feel some ringing in the ear       SUBJECTIVE:    Valerie Welch is a 52 y.o. male who is here today with complaints of ongoing nasal congestion, ringing in ears, and muffled hearing since his last encounter. On 11/07/2023, the patient was seen for symptoms of ear infection and upper respiratory tract infection. He was placed on amoxicillin as well as a steroid pack and states that his symptoms improved significantly. He no longer has ear pain, but has been experiencing a great deal of sinus pressure with thick white discharge, muffled hearing, and occasional ringing in his ears. He has been using some over-the-counter Flonase with minimal benefit. He admits that he used to take Zyrtec regularly in the past.  He continues to use a CPAP during sleep at night due to sleep apnea. He states this has been somewhat difficult due to his clogged sinuses. Current Outpatient Medications   Medication Sig Dispense Refill    cetirizine (ZYRTEC) 10 MG tablet Take 1 tablet by mouth daily 90 tablet 1    fluticasone (FLONASE) 50 MCG/ACT nasal spray 2 sprays by Each Nostril route daily 16 g 3    methylPREDNISolone (MEDROL DOSEPACK) 4 MG tablet Per package instructions.  1 kit 0    clonazePAM (KLONOPIN) 1 MG tablet TAKE 1 TABLET BY MOUTH EACH NIGHT AT BEDTIME 90 tablet 0    buPROPion (WELLBUTRIN XL) 300 MG extended release tablet TAKE 1 TABLET BY MOUTH EVERY DAY 90 tablet 1    Multiple Vitamins-Minerals (MENS MULTIVITAMIN PO) Take by mouth      atorvastatin (LIPITOR) 10 MG tablet TAKE 1 TABLET BY MOUTH EVERY DAY 90 tablet 1    losartan (COZAAR) 100 MG tablet TAKE 1 TABLET BY MOUTH EVERY DAY 90 tablet 1    albuterol sulfate HFA (PROVENTIL;VENTOLIN;PROAIR) 108 (90 Base) MCG/ACT inhaler Inhale 2 puffs into the lungs every 4 hours as needed      amLODIPine (NORVASC) 5 MG tablet TAKE 1 TABLET BY MOUTH EVERY DAY FOR

## 2023-12-04 NOTE — TELEPHONE ENCOUNTER
Requested Prescriptions     Pending Prescriptions Disp Refills    atorvastatin (LIPITOR) 10 MG tablet [Pharmacy Med Name: ATORVASTATIN 10 MG TABLET] 90 tablet 1     Sig: TAKE 1 TABLET BY MOUTH EVERY DAY    losartan (COZAAR) 100 MG tablet [Pharmacy Med Name: LOSARTAN POTASSIUM 100 MG TAB] 90 tablet 1     Sig: TAKE 1 TABLET BY MOUTH EVERY DAY       RX refill request from the patient/pharmacy. Patient last seen 11/30/23 with labs, and next appt. scheduled for 1/16/24.

## 2023-12-05 RX ORDER — LOSARTAN POTASSIUM 100 MG/1
TABLET ORAL
Qty: 90 TABLET | Refills: 1 | Status: SHIPPED | OUTPATIENT
Start: 2023-12-05

## 2023-12-05 RX ORDER — ATORVASTATIN CALCIUM 10 MG/1
TABLET, FILM COATED ORAL
Qty: 90 TABLET | Refills: 1 | Status: SHIPPED | OUTPATIENT
Start: 2023-12-05

## 2023-12-28 DIAGNOSIS — F41.1 GENERALIZED ANXIETY DISORDER: ICD-10-CM

## 2023-12-28 RX ORDER — CLONAZEPAM 1 MG/1
TABLET ORAL
Qty: 90 TABLET | Refills: 0 | Status: SHIPPED | OUTPATIENT
Start: 2023-12-28 | End: 2024-03-28

## 2023-12-28 NOTE — TELEPHONE ENCOUNTER
PCP: SHARIFA Morrow NP    Last appt: 11/30/2023    Future Appointments   Date Time Provider [address]   1/16/2024  8:00 AM SHARIFA Morrow NP [facility] AMB       Requested Prescriptions     Pending Prescriptions Disp Refills    clonazePAM (KLONOPIN) 1 MG tablet 90 tablet 0     Sig: TAKE 1 TABLET BY MOUTH EACH NIGHT AT BEDTIME

## 2024-01-16 ENCOUNTER — OFFICE VISIT (OUTPATIENT)
Facility: CLINIC | Age: 54
End: 2024-01-16
Payer: COMMERCIAL

## 2024-01-16 VITALS
SYSTOLIC BLOOD PRESSURE: 122 MMHG | WEIGHT: 290.8 LBS | RESPIRATION RATE: 16 BRPM | HEIGHT: 70 IN | DIASTOLIC BLOOD PRESSURE: 74 MMHG | BODY MASS INDEX: 41.63 KG/M2 | HEART RATE: 85 BPM | TEMPERATURE: 98.2 F | OXYGEN SATURATION: 97 %

## 2024-01-16 DIAGNOSIS — H93.13 TINNITUS OF BOTH EARS: ICD-10-CM

## 2024-01-16 DIAGNOSIS — F32.4 MAJOR DEPRESSIVE DISORDER WITH SINGLE EPISODE, IN PARTIAL REMISSION (HCC): Primary | ICD-10-CM

## 2024-01-16 DIAGNOSIS — F41.1 GENERALIZED ANXIETY DISORDER: ICD-10-CM

## 2024-01-16 DIAGNOSIS — R09.81 NASAL CONGESTION: ICD-10-CM

## 2024-01-16 DIAGNOSIS — E78.2 MIXED HYPERLIPIDEMIA: ICD-10-CM

## 2024-01-16 DIAGNOSIS — Z12.5 SCREENING FOR MALIGNANT NEOPLASM OF PROSTATE: ICD-10-CM

## 2024-01-16 DIAGNOSIS — J30.89 ENVIRONMENTAL AND SEASONAL ALLERGIES: ICD-10-CM

## 2024-01-16 DIAGNOSIS — G47.33 OSA ON CPAP: ICD-10-CM

## 2024-01-16 DIAGNOSIS — I10 ESSENTIAL HYPERTENSION: ICD-10-CM

## 2024-01-16 DIAGNOSIS — E66.01 CLASS 3 SEVERE OBESITY DUE TO EXCESS CALORIES WITH SERIOUS COMORBIDITY AND BODY MASS INDEX (BMI) OF 40.0 TO 44.9 IN ADULT (HCC): ICD-10-CM

## 2024-01-16 DIAGNOSIS — R73.01 IMPAIRED FASTING GLUCOSE: ICD-10-CM

## 2024-01-16 LAB
ALBUMIN SERPL-MCNC: 4.1 G/DL (ref 3.5–5)
ALBUMIN/GLOB SERPL: 1.4 (ref 1.1–2.2)
ALP SERPL-CCNC: 137 U/L (ref 45–117)
ALT SERPL-CCNC: 44 U/L (ref 12–78)
ANION GAP SERPL CALC-SCNC: 6 MMOL/L (ref 5–15)
AST SERPL-CCNC: 20 U/L (ref 15–37)
BILIRUB SERPL-MCNC: 0.4 MG/DL (ref 0.2–1)
BUN SERPL-MCNC: 18 MG/DL (ref 6–20)
BUN/CREAT SERPL: 16 (ref 12–20)
CALCIUM SERPL-MCNC: 8.7 MG/DL (ref 8.5–10.1)
CHLORIDE SERPL-SCNC: 109 MMOL/L (ref 97–108)
CHOLEST SERPL-MCNC: 122 MG/DL
CO2 SERPL-SCNC: 25 MMOL/L (ref 21–32)
CREAT SERPL-MCNC: 1.16 MG/DL (ref 0.7–1.3)
ERYTHROCYTE [DISTWIDTH] IN BLOOD BY AUTOMATED COUNT: 15.3 % (ref 11.5–14.5)
GLOBULIN SER CALC-MCNC: 3 G/DL (ref 2–4)
GLUCOSE SERPL-MCNC: 114 MG/DL (ref 65–100)
HCT VFR BLD AUTO: 47.9 % (ref 36.6–50.3)
HDLC SERPL-MCNC: 37 MG/DL
HDLC SERPL: 3.3 (ref 0–5)
HGB BLD-MCNC: 15.6 G/DL (ref 12.1–17)
LDLC SERPL CALC-MCNC: 59.6 MG/DL (ref 0–100)
MCH RBC QN AUTO: 27.9 PG (ref 26–34)
MCHC RBC AUTO-ENTMCNC: 32.6 G/DL (ref 30–36.5)
MCV RBC AUTO: 85.7 FL (ref 80–99)
NRBC # BLD: 0 K/UL (ref 0–0.01)
NRBC BLD-RTO: 0 PER 100 WBC
PLATELET # BLD AUTO: 251 K/UL (ref 150–400)
PMV BLD AUTO: 10.7 FL (ref 8.9–12.9)
POTASSIUM SERPL-SCNC: 4.4 MMOL/L (ref 3.5–5.1)
PROT SERPL-MCNC: 7.1 G/DL (ref 6.4–8.2)
PSA SERPL-MCNC: 0.3 NG/ML (ref 0.01–4)
RBC # BLD AUTO: 5.59 M/UL (ref 4.1–5.7)
SODIUM SERPL-SCNC: 140 MMOL/L (ref 136–145)
TRIGL SERPL-MCNC: 127 MG/DL
VLDLC SERPL CALC-MCNC: 25.4 MG/DL
WBC # BLD AUTO: 6.4 K/UL (ref 4.1–11.1)

## 2024-01-16 PROCEDURE — 99214 OFFICE O/P EST MOD 30 MIN: CPT | Performed by: NURSE PRACTITIONER

## 2024-01-16 PROCEDURE — 3074F SYST BP LT 130 MM HG: CPT | Performed by: NURSE PRACTITIONER

## 2024-01-16 PROCEDURE — 3078F DIAST BP <80 MM HG: CPT | Performed by: NURSE PRACTITIONER

## 2024-01-16 RX ORDER — IPRATROPIUM BROMIDE 42 UG/1
2 SPRAY, METERED NASAL 4 TIMES DAILY
Qty: 15 ML | Refills: 3 | Status: SHIPPED | OUTPATIENT
Start: 2024-01-16

## 2024-01-16 RX ORDER — FEXOFENADINE HCL 180 MG/1
180 TABLET ORAL DAILY
Qty: 90 TABLET | Refills: 1 | Status: SHIPPED | OUTPATIENT
Start: 2024-01-16

## 2024-01-16 ASSESSMENT — PATIENT HEALTH QUESTIONNAIRE - PHQ9
SUM OF ALL RESPONSES TO PHQ9 QUESTIONS 1 & 2: 2
1. LITTLE INTEREST OR PLEASURE IN DOING THINGS: 1
SUM OF ALL RESPONSES TO PHQ QUESTIONS 1-9: 2
SUM OF ALL RESPONSES TO PHQ QUESTIONS 1-9: 2
3. TROUBLE FALLING OR STAYING ASLEEP: 0
7. TROUBLE CONCENTRATING ON THINGS, SUCH AS READING THE NEWSPAPER OR WATCHING TELEVISION: 0
6. FEELING BAD ABOUT YOURSELF - OR THAT YOU ARE A FAILURE OR HAVE LET YOURSELF OR YOUR FAMILY DOWN: 0
10. IF YOU CHECKED OFF ANY PROBLEMS, HOW DIFFICULT HAVE THESE PROBLEMS MADE IT FOR YOU TO DO YOUR WORK, TAKE CARE OF THINGS AT HOME, OR GET ALONG WITH OTHER PEOPLE: 0
SUM OF ALL RESPONSES TO PHQ QUESTIONS 1-9: 2
4. FEELING TIRED OR HAVING LITTLE ENERGY: 0
5. POOR APPETITE OR OVEREATING: 0
SUM OF ALL RESPONSES TO PHQ QUESTIONS 1-9: 2
9. THOUGHTS THAT YOU WOULD BE BETTER OFF DEAD, OR OF HURTING YOURSELF: 0
2. FEELING DOWN, DEPRESSED OR HOPELESS: 1
8. MOVING OR SPEAKING SO SLOWLY THAT OTHER PEOPLE COULD HAVE NOTICED. OR THE OPPOSITE, BEING SO FIGETY OR RESTLESS THAT YOU HAVE BEEN MOVING AROUND A LOT MORE THAN USUAL: 0

## 2024-01-16 NOTE — PROGRESS NOTES
Siva Barbour is a 53 y.o. male     Chief Complaint   Patient presents with    Cholesterol Problem     6M    Hypertension     6M       /74 (Site: Left Upper Arm, Position: Sitting, Cuff Size: Large Adult)   Pulse 85   Temp 98.2 °F (36.8 °C) (Oral)   Resp 16   Ht 1.778 m (5' 10\")   Wt 131.9 kg (290 lb 12.8 oz)   SpO2 97%   BMI 41.73 kg/m²     Health Maintenance Due   Topic Date Due    Hepatitis B vaccine (1 of 3 - 3-dose series) Never done    Pneumococcal 0-64 years Vaccine (2 - PCV) 10/24/2012    Shingles vaccine (1 of 2) Never done    Flu vaccine (1) 08/01/2023    COVID-19 Vaccine (5 - 2023-24 season) 09/01/2023    DTaP/Tdap/Td vaccine (2 - Td or Tdap) 10/17/2023    Colorectal Cancer Screen  12/17/2023         \"Have you been to the ER, urgent care clinic since your last visit?  Hospitalized since your last visit?\"    NO    “Have you seen or consulted any other health care providers outside of [facility] since your last visit?”    NO    “Have you had a colorectal cancer screening such as a colonoscopy/FIT/Cologuard?    YES - Type: Colonoscopy - Where: colon rectal specialist  Nurse/CMA to request most recent records if not in the chart

## 2024-01-16 NOTE — PROGRESS NOTES
Chief Complaint   Patient presents with    Cholesterol Problem     6M    Hypertension     6M       SUBJECTIVE:    Siva Barbour is a 53 y.o. male who is here today for a follow up appointment regarding current conditions including: MDD, morbid obesity, HTN, next hyperlipidemia, NOAH with CPAP use, MERARY, and ongoing allergies as well as bilateral tinnitus and nasal congestion.    The patient remains on bupropion  mg daily for management of his depression.  His depression has been well-controlled and stable overall.  He denies any adverse side effects of the medication.  He continues to take clonazepam nightly for sleep/anxiety.  This has been helpful for him overall.    He continues to be treated for his hypertension with use of amlodipine and losartan.  He has tolerated the medications well without adverse side effects.  His blood pressure has remained stable overall.  He continues to take atorvastatin 10 mg daily for his mixed hyperlipidemia.  He has been tolerating this well.  He denies any recent episodes of chest pain, chest pressure, shortness of breath, headaches, dizziness, blurred vision, palpitations, or syncope episodes.  He states he is not as watchful of his diet as he should be and has not been exercising as regularly as he once was.  He has gained approximately 18 pounds since his last encounter.    He continues to have ongoing ringing to both ears.  He states this has been persistent for the better part of 20 years.  He denies any pain or discharge.  He has no known trauma.  He does admit to ongoing issues with sinus congestion which is more predominant in the morning hours upon waking.  He continues to use a CPAP at night for NOAH management and is compliant with use.  He is currently on cetirizine as well as Flonase, but uses the Flonase only \"as needed.\"          Current Outpatient Medications   Medication Sig Dispense Refill    ipratropium (ATROVENT) 0.06 % nasal spray 2 sprays by Each

## 2024-01-17 LAB
EST. AVERAGE GLUCOSE BLD GHB EST-MCNC: 114 MG/DL
HBA1C MFR BLD: 5.6 % (ref 4–5.6)

## 2024-01-29 DIAGNOSIS — I10 ESSENTIAL (PRIMARY) HYPERTENSION: ICD-10-CM

## 2024-01-29 NOTE — TELEPHONE ENCOUNTER
PCP: Froylan Wallace APRN - NP    Last appt: 1/16/2024    Future Appointments   Date Time Provider Department Center   7/17/2024  8:30 AM Froylan Wallace APRN - NP [facility]       Requested Prescriptions     Pending Prescriptions Disp Refills    amLODIPine (NORVASC) 5 MG tablet [Pharmacy Med Name: AMLODIPINE BESYLATE 5 MG TAB] 90 tablet 1     Sig: TAKE 1 TABLET BY MOUTH EVERY DAY

## 2024-01-30 RX ORDER — AMLODIPINE BESYLATE 5 MG/1
5 TABLET ORAL DAILY
Qty: 90 TABLET | Refills: 1 | Status: SHIPPED | OUTPATIENT
Start: 2024-01-30

## 2024-03-04 RX ORDER — BUPROPION HYDROCHLORIDE 300 MG/1
TABLET ORAL
Qty: 90 TABLET | Refills: 1 | Status: SHIPPED | OUTPATIENT
Start: 2024-03-04

## 2024-03-04 NOTE — TELEPHONE ENCOUNTER
PCP: Froylan Wallace APRN - NP    Last appt: 1/16/2024    Future Appointments   Date Time Provider Department Center   3/25/2024  8:30 AM [facility] CT 3 [facility]CT [facility]   7/17/2024  8:30 AM Froylan Wallace APRN - NP [facility]       Requested Prescriptions     Pending Prescriptions Disp Refills    buPROPion (WELLBUTRIN XL) 300 MG extended release tablet [Pharmacy Med Name: BUPROPION HCL  MG TABLET] 90 tablet 1     Sig: TAKE 1 TABLET BY MOUTH EVERY DAY

## 2024-03-25 ENCOUNTER — HOSPITAL ENCOUNTER (OUTPATIENT)
Facility: HOSPITAL | Age: 54
Discharge: HOME | End: 2024-03-28
Attending: OTOLARYNGOLOGY
Payer: COMMERCIAL

## 2024-03-25 DIAGNOSIS — J32.0 CHRONIC MAXILLARY SINUSITIS: ICD-10-CM

## 2024-03-25 PROCEDURE — 70486 CT MAXILLOFACIAL W/O DYE: CPT

## 2024-03-29 DIAGNOSIS — F41.1 GENERALIZED ANXIETY DISORDER: ICD-10-CM

## 2024-03-29 RX ORDER — CLONAZEPAM 1 MG/1
TABLET ORAL
Qty: 90 TABLET | Refills: 0 | Status: SHIPPED | OUTPATIENT
Start: 2024-03-29 | End: 2024-06-29

## 2024-03-29 NOTE — TELEPHONE ENCOUNTER
PCP: Froylan Wallace APRN - NP    Last appt: 1/16/2024    Future Appointments   Date Time Provider Department Center   7/17/2024  8:30 AM Froylan Wallace APRN - NP [facility]       Requested Prescriptions     Pending Prescriptions Disp Refills    clonazePAM (KLONOPIN) 1 MG tablet [Pharmacy Med Name: CLONAZEPAM 1 MG TABLET] 90 tablet 0     Sig: TAKE 1 TABLET BY MOUTH EACH NIGHT AT BEDTIME

## 2024-05-31 RX ORDER — LOSARTAN POTASSIUM 100 MG/1
TABLET ORAL
Qty: 90 TABLET | Refills: 1 | Status: SHIPPED | OUTPATIENT
Start: 2024-05-31

## 2024-05-31 NOTE — TELEPHONE ENCOUNTER
PCP: Froylan Wallace APRN - NP    Last appt: 1/16/2024    Future Appointments   Date Time Provider Department Center   7/17/2024  8:30 AM Froylan Wallace APRN - NP [facility]       Requested Prescriptions     Pending Prescriptions Disp Refills    losartan (COZAAR) 100 MG tablet [Pharmacy Med Name: LOSARTAN POTASSIUM 100 MG TAB] 90 tablet 1     Sig: TAKE 1 TABLET BY MOUTH EVERY DAY

## 2024-06-25 DIAGNOSIS — F41.1 GENERALIZED ANXIETY DISORDER: ICD-10-CM

## 2024-06-26 RX ORDER — CLONAZEPAM 1 MG/1
1 TABLET ORAL NIGHTLY PRN
Qty: 90 TABLET | Refills: 1 | Status: SHIPPED | OUTPATIENT
Start: 2024-06-26 | End: 2024-12-23

## 2024-06-26 NOTE — TELEPHONE ENCOUNTER
PCP: Froylan Wallace APRN - NP    Last appt: 1/16/2024    Future Appointments   Date Time Provider Department Center   7/17/2024  8:30 AM Froylan Wallace APRN - NP [facility]       Requested Prescriptions     Pending Prescriptions Disp Refills    clonazePAM (KLONOPIN) 1 MG tablet 90 tablet 0

## 2024-07-17 ENCOUNTER — OFFICE VISIT (OUTPATIENT)
Facility: CLINIC | Age: 54
End: 2024-07-17
Payer: COMMERCIAL

## 2024-07-17 VITALS
TEMPERATURE: 98.7 F | RESPIRATION RATE: 16 BRPM | SYSTOLIC BLOOD PRESSURE: 116 MMHG | OXYGEN SATURATION: 98 % | HEART RATE: 76 BPM | WEIGHT: 281 LBS | BODY MASS INDEX: 40.23 KG/M2 | DIASTOLIC BLOOD PRESSURE: 76 MMHG | HEIGHT: 70 IN

## 2024-07-17 DIAGNOSIS — J30.89 ENVIRONMENTAL AND SEASONAL ALLERGIES: ICD-10-CM

## 2024-07-17 DIAGNOSIS — F32.4 MAJOR DEPRESSIVE DISORDER WITH SINGLE EPISODE, IN PARTIAL REMISSION (HCC): ICD-10-CM

## 2024-07-17 DIAGNOSIS — E78.2 MIXED HYPERLIPIDEMIA: ICD-10-CM

## 2024-07-17 DIAGNOSIS — Z12.11 SCREEN FOR COLON CANCER: ICD-10-CM

## 2024-07-17 DIAGNOSIS — G47.33 OSA ON CPAP: ICD-10-CM

## 2024-07-17 DIAGNOSIS — F41.1 GENERALIZED ANXIETY DISORDER: ICD-10-CM

## 2024-07-17 DIAGNOSIS — E66.01 CLASS 3 SEVERE OBESITY DUE TO EXCESS CALORIES WITH SERIOUS COMORBIDITY AND BODY MASS INDEX (BMI) OF 40.0 TO 44.9 IN ADULT (HCC): ICD-10-CM

## 2024-07-17 DIAGNOSIS — I10 ESSENTIAL HYPERTENSION: ICD-10-CM

## 2024-07-17 DIAGNOSIS — Z00.00 ROUTINE PHYSICAL EXAMINATION: Primary | ICD-10-CM

## 2024-07-17 PROBLEM — E66.813 CLASS 3 SEVERE OBESITY DUE TO EXCESS CALORIES WITH SERIOUS COMORBIDITY AND BODY MASS INDEX (BMI) OF 40.0 TO 44.9 IN ADULT: Status: ACTIVE | Noted: 2024-07-17

## 2024-07-17 PROCEDURE — 3074F SYST BP LT 130 MM HG: CPT | Performed by: NURSE PRACTITIONER

## 2024-07-17 PROCEDURE — 99396 PREV VISIT EST AGE 40-64: CPT | Performed by: NURSE PRACTITIONER

## 2024-07-17 PROCEDURE — 3078F DIAST BP <80 MM HG: CPT | Performed by: NURSE PRACTITIONER

## 2024-07-17 SDOH — ECONOMIC STABILITY: INCOME INSECURITY: HOW HARD IS IT FOR YOU TO PAY FOR THE VERY BASICS LIKE FOOD, HOUSING, MEDICAL CARE, AND HEATING?: NOT HARD AT ALL

## 2024-07-17 SDOH — ECONOMIC STABILITY: FOOD INSECURITY: WITHIN THE PAST 12 MONTHS, YOU WORRIED THAT YOUR FOOD WOULD RUN OUT BEFORE YOU GOT MONEY TO BUY MORE.: NEVER TRUE

## 2024-07-17 SDOH — ECONOMIC STABILITY: FOOD INSECURITY: WITHIN THE PAST 12 MONTHS, THE FOOD YOU BOUGHT JUST DIDN'T LAST AND YOU DIDN'T HAVE MONEY TO GET MORE.: NEVER TRUE

## 2024-07-17 NOTE — PROGRESS NOTES
Siva Barbour is a 54 y.o. male     Chief Complaint   Patient presents with    Annual Exam       /76 (Site: Left Upper Arm, Position: Sitting, Cuff Size: Large Adult)   Pulse 76   Temp 98.7 °F (37.1 °C) (Oral)   Resp 16   Ht 1.778 m (5' 10\")   Wt 127.5 kg (281 lb)   SpO2 98%   BMI 40.32 kg/m²     Health Maintenance Due   Topic Date Due    Hepatitis B vaccine (1 of 3 - 3-dose series) Never done    Pneumococcal 0-64 years Vaccine (2 of 2 - PCV) 10/24/2012    Shingles vaccine (1 of 2) Never done    COVID-19 Vaccine (5 - 2023-24 season) 09/01/2023    DTaP/Tdap/Td vaccine (2 - Td or Tdap) 10/17/2023    Colorectal Cancer Screen  12/17/2023         \"Have you been to the ER, urgent care clinic since your last visit?  Hospitalized since your last visit?\"    NO    “Have you seen or consulted any other health care providers outside of [facility] since your last visit?”    NO    “Have you had a colorectal cancer screening such as a colonoscopy/FIT/Cologuard?    NO    Date of last Colonoscopy: 12/17/2018  No cologuard on file  No FIT/FOBT on file   No flexible sigmoidoscopy on file                      
production, SOB, LU, wheezing, pleuritic pain   Cardiology:                chest pain, palpitations, orthopnea, PND, edema, syncope   Gastrointestinal:       abdominal pain , N/V, diarrhea, dysphagia, constipation, bleeding   Genitourinary:           frequency, urgency, dysuria, hematuria, incontinence   Muskuloskeletal :      arthralgia, myalgia, back pain  Hematology:              easy bruising, nose or gum bleeding, lymphadenopathy   Dermatological:         rash, ulceration, pruritis, color change / jaundice  Endocrine:                 hot flashes or polydipsia   Neurological:             headache, dizziness, confusion, focal weakness, paresthesia,                                      Speech difficulties, memory loss, gait difficulty  Psychological:          Feelings of anxiety, depression, agitation        Social History     Socioeconomic History    Marital status: Single     Spouse name: None    Number of children: None    Years of education: None    Highest education level: None   Tobacco Use    Smoking status: Never     Passive exposure: Never    Smokeless tobacco: Never   Vaping Use    Vaping Use: Never used   Substance and Sexual Activity    Alcohol use: Yes    Drug use: Never    Sexual activity: Yes     Partners: Female     Birth control/protection: Condom     Social Determinants of Health     Financial Resource Strain: Low Risk  (7/17/2024)    Overall Financial Resource Strain (CARDIA)     Difficulty of Paying Living Expenses: Not hard at all   Food Insecurity: No Food Insecurity (7/17/2024)    Hunger Vital Sign     Worried About Running Out of Food in the Last Year: Never true     Ran Out of Food in the Last Year: Never true   Transportation Needs: Unknown (7/17/2024)    PRAPARE - Transportation     Lack of Transportation (Non-Medical): No   Housing Stability: Unknown (7/17/2024)    Housing Stability Vital Sign     Unstable Housing in the Last Year: No     Family History   Problem Relation Age of Onset

## 2024-07-18 LAB
ALBUMIN SERPL-MCNC: 4 G/DL (ref 3.5–5)
ALBUMIN/GLOB SERPL: 1.3 (ref 1.1–2.2)
ALP SERPL-CCNC: 131 U/L (ref 45–117)
ALT SERPL-CCNC: 44 U/L (ref 12–78)
ANION GAP SERPL CALC-SCNC: 6 MMOL/L (ref 5–15)
APPEARANCE UR: CLEAR
AST SERPL-CCNC: 25 U/L (ref 15–37)
BILIRUB SERPL-MCNC: 0.4 MG/DL (ref 0.2–1)
BILIRUB UR QL: NEGATIVE
BUN SERPL-MCNC: 16 MG/DL (ref 6–20)
BUN/CREAT SERPL: 15 (ref 12–20)
CALCIUM SERPL-MCNC: 9.2 MG/DL (ref 8.5–10.1)
CHLORIDE SERPL-SCNC: 110 MMOL/L (ref 97–108)
CHOLEST SERPL-MCNC: 120 MG/DL
CO2 SERPL-SCNC: 25 MMOL/L (ref 21–32)
COLOR UR: NORMAL
CREAT SERPL-MCNC: 1.09 MG/DL (ref 0.7–1.3)
ERYTHROCYTE [DISTWIDTH] IN BLOOD BY AUTOMATED COUNT: 14.8 % (ref 11.5–14.5)
EST. AVERAGE GLUCOSE BLD GHB EST-MCNC: 100 MG/DL
GLOBULIN SER CALC-MCNC: 3.2 G/DL (ref 2–4)
GLUCOSE SERPL-MCNC: 103 MG/DL (ref 65–100)
GLUCOSE UR STRIP.AUTO-MCNC: NEGATIVE MG/DL
HBA1C MFR BLD: 5.1 % (ref 4–5.6)
HCT VFR BLD AUTO: 47.8 % (ref 36.6–50.3)
HDLC SERPL-MCNC: 35 MG/DL
HDLC SERPL: 3.4 (ref 0–5)
HGB BLD-MCNC: 16 G/DL (ref 12.1–17)
HGB UR QL STRIP: NEGATIVE
KETONES UR QL STRIP.AUTO: NEGATIVE MG/DL
LDLC SERPL CALC-MCNC: 54.6 MG/DL (ref 0–100)
LEUKOCYTE ESTERASE UR QL STRIP.AUTO: NEGATIVE
MCH RBC QN AUTO: 29.2 PG (ref 26–34)
MCHC RBC AUTO-ENTMCNC: 33.5 G/DL (ref 30–36.5)
MCV RBC AUTO: 87.2 FL (ref 80–99)
NITRITE UR QL STRIP.AUTO: NEGATIVE
NRBC # BLD: 0 K/UL (ref 0–0.01)
NRBC BLD-RTO: 0 PER 100 WBC
PH UR STRIP: 5.5 (ref 5–8)
PLATELET # BLD AUTO: 218 K/UL (ref 150–400)
PMV BLD AUTO: 10.6 FL (ref 8.9–12.9)
POTASSIUM SERPL-SCNC: 4.6 MMOL/L (ref 3.5–5.1)
PROT SERPL-MCNC: 7.2 G/DL (ref 6.4–8.2)
PROT UR STRIP-MCNC: NEGATIVE MG/DL
RBC # BLD AUTO: 5.48 M/UL (ref 4.1–5.7)
SODIUM SERPL-SCNC: 141 MMOL/L (ref 136–145)
SP GR UR REFRACTOMETRY: 1.02 (ref 1–1.03)
TRIGL SERPL-MCNC: 152 MG/DL
TSH SERPL DL<=0.05 MIU/L-ACNC: 1.42 UIU/ML (ref 0.36–3.74)
UROBILINOGEN UR QL STRIP.AUTO: 0.2 EU/DL (ref 0.2–1)
VLDLC SERPL CALC-MCNC: 30.4 MG/DL
WBC # BLD AUTO: 5.8 K/UL (ref 4.1–11.1)

## 2024-08-05 DIAGNOSIS — I10 ESSENTIAL (PRIMARY) HYPERTENSION: ICD-10-CM

## 2024-08-05 DIAGNOSIS — R09.81 NASAL CONGESTION: ICD-10-CM

## 2024-08-05 DIAGNOSIS — H93.13 TINNITUS OF BOTH EARS: ICD-10-CM

## 2024-08-05 DIAGNOSIS — J30.89 ENVIRONMENTAL AND SEASONAL ALLERGIES: ICD-10-CM

## 2024-08-05 RX ORDER — ATORVASTATIN CALCIUM 10 MG/1
TABLET, FILM COATED ORAL
Qty: 90 TABLET | Refills: 1 | Status: SHIPPED | OUTPATIENT
Start: 2024-08-05

## 2024-08-05 RX ORDER — AMLODIPINE BESYLATE 5 MG/1
5 TABLET ORAL DAILY
Qty: 90 TABLET | Refills: 1 | Status: SHIPPED | OUTPATIENT
Start: 2024-08-05

## 2024-08-05 RX ORDER — FEXOFENADINE HCL 180 MG/1
180 TABLET ORAL DAILY
Qty: 90 TABLET | Refills: 1 | Status: SHIPPED | OUTPATIENT
Start: 2024-08-05

## 2024-08-05 NOTE — TELEPHONE ENCOUNTER
PCP: Froylan Wallace APRN - NP    Last appt: 7/17/2024    Future Appointments   Date Time Provider Department Center   1/22/2025  8:30 AM Froylan Wallace APRN - NP [facility]       Requested Prescriptions     Pending Prescriptions Disp Refills    atorvastatin (LIPITOR) 10 MG tablet [Pharmacy Med Name: ATORVASTATIN 10 MG TABLET] 90 tablet 1     Sig: TAKE 1 TABLET BY MOUTH EVERY DAY    fexofenadine (ALLEGRA) 180 MG tablet [Pharmacy Med Name: FEXOFENADINE  MG TABLET] 90 tablet 1     Sig: TAKE 1 TABLET BY MOUTH EVERY DAY    amLODIPine (NORVASC) 5 MG tablet [Pharmacy Med Name: AMLODIPINE BESYLATE 5 MG TAB] 90 tablet 1     Sig: TAKE 1 TABLET BY MOUTH EVERY DAY

## 2024-08-26 RX ORDER — BUPROPION HYDROCHLORIDE 300 MG/1
TABLET ORAL
Qty: 90 TABLET | Refills: 1 | Status: SHIPPED | OUTPATIENT
Start: 2024-08-26

## 2024-08-26 NOTE — TELEPHONE ENCOUNTER
PCP: Froylan Wlalace APRN - NP    Last appt: 7/17/2024    Future Appointments   Date Time Provider Department Center   1/22/2025  8:30 AM Froylan Wallace APRN - NP [facility] DEP       Requested Prescriptions     Pending Prescriptions Disp Refills    buPROPion (WELLBUTRIN XL) 300 MG extended release tablet [Pharmacy Med Name: BUPROPION HCL  MG TABLET] 90 tablet 1     Sig: TAKE 1 TABLET BY MOUTH EVERY DAY

## 2024-10-30 ENCOUNTER — OFFICE VISIT (OUTPATIENT)
Facility: CLINIC | Age: 54
End: 2024-10-30
Payer: COMMERCIAL

## 2024-10-30 VITALS
BODY MASS INDEX: 39.94 KG/M2 | SYSTOLIC BLOOD PRESSURE: 120 MMHG | TEMPERATURE: 98.2 F | RESPIRATION RATE: 17 BRPM | HEART RATE: 78 BPM | DIASTOLIC BLOOD PRESSURE: 80 MMHG | OXYGEN SATURATION: 97 % | HEIGHT: 70 IN | WEIGHT: 279 LBS

## 2024-10-30 DIAGNOSIS — J06.9 VIRAL URI: Primary | ICD-10-CM

## 2024-10-30 DIAGNOSIS — J30.89 NON-SEASONAL ALLERGIC RHINITIS, UNSPECIFIED TRIGGER: ICD-10-CM

## 2024-10-30 PROCEDURE — 3079F DIAST BP 80-89 MM HG: CPT | Performed by: PHYSICIAN ASSISTANT

## 2024-10-30 PROCEDURE — 3074F SYST BP LT 130 MM HG: CPT | Performed by: PHYSICIAN ASSISTANT

## 2024-10-30 PROCEDURE — 99213 OFFICE O/P EST LOW 20 MIN: CPT | Performed by: PHYSICIAN ASSISTANT

## 2024-10-30 NOTE — PROGRESS NOTES
Siva Barbour is a 54 y.o. male     Chief Complaint   Patient presents with    Sinusitis     Sinus infection neg COVID test 10/29       /80 (Site: Left Upper Arm, Position: Sitting, Cuff Size: Large Adult)   Pulse 78   Temp 98.2 °F (36.8 °C) (Oral)   Resp 17   Ht 1.778 m (5' 10\")   Wt 126.6 kg (279 lb)   SpO2 97%   BMI 40.03 kg/m²     Health Maintenance Due   Topic Date Due    Hepatitis B vaccine (1 of 3 - 19+ 3-dose series) Never done    Pneumococcal 0-64 years Vaccine (2 of 2 - PCV) 10/24/2012    Shingles vaccine (1 of 2) Never done    DTaP/Tdap/Td vaccine (2 - Td or Tdap) 10/17/2023    Colorectal Cancer Screen  12/17/2023    Flu vaccine (1) 08/01/2024    COVID-19 Vaccine (5 - 2023-24 season) 09/01/2024         \"Have you been to the ER, urgent care clinic since your last visit?  Hospitalized since your last visit?\"    NO    “Have you seen or consulted any other health care providers outside of [facility] since your last visit?”    NO    “Have you had a colorectal cancer screening such as a colonoscopy/FIT/Cologuard?    NO    Date of last Colonoscopy: 12/17/2018  No cologuard on file  No FIT/FOBT on file   No flexible sigmoidoscopy on file

## 2024-10-30 NOTE — PATIENT INSTRUCTIONS
Use Flonase 2 sprays per nostril once daily for at least 2 weeks    Mucinex-D as needed    If needing albuterol more than twice per week moving forward, then you should be on a maintenance inhaler, let us know if not improving.

## 2024-10-30 NOTE — PROGRESS NOTES
Subjective:   Siva Barbour is a 54 y.o. male who complains of  symptoms onset 2 wks ago with fever, then developed head congestion, rhinitis, sinus pressure, waxing and waning since that time. All  mucus has been clear to off-white.   He has h/o of URI, exercise induced asthma, would rarely use albuterol  He felt slight wheeze, dyspnea and dry cough  with current sxs and used albuterol  2-3x with benefit - mainly at night since he wears a CPAP and any respiratory sxs can make it difficult to tolerate the CPAP; does not feel the need to use albuterol today  Had sinusitis this time last year, burst his eardrum, saw ENT - but currently no ear pain  Also gets allergies, will take Flonase 1 spray per nostril daily since last year and Allegra daily; He recently took Afrin to treat current sxs for 3 days  Tried OTC cold remedies (Mucinex DM) with temporary relief.  No evaluation to date.   He does feel he is improving today      Past Medical History:   Diagnosis Date    Arthritis     Asthma 8/2/2017    BMI 40.0-44.9, adult 8/2/2017    Depression 8/2/2017    Deviated nasal septum 8/2/2017    Essential hypertension 8/3/2017    Fatty liver 8/2/2017    Hyperlipidemia 8/2/2017    Hypertension     IGT (impaired glucose tolerance) 8/2/2017    Insomnia 8/2/2017    Knee pain, left 8/2/2017    Metatarsalgia 8/2/2017    NOAH on CPAP 8/2/2017    Plantar fasciitis 8/2/2017    Psychiatric disorder     anxiety and depression    Sleep apnea     cpap    Vitamin D deficiency 8/2/2017     Social History     Tobacco Use    Smoking status: Never     Passive exposure: Never    Smokeless tobacco: Never   Vaping Use    Vaping status: Never Used   Substance Use Topics    Alcohol use: Yes    Drug use: Never       Allergies   Allergen Reactions    Lisinopril      Other reaction(s): Unknown (comments)      Current Outpatient Medications   Medication Sig Dispense Refill    buPROPion (WELLBUTRIN XL) 300 MG extended release tablet TAKE 1 TABLET BY

## 2024-12-06 RX ORDER — LOSARTAN POTASSIUM 100 MG/1
TABLET ORAL
Qty: 90 TABLET | Refills: 1 | Status: SHIPPED | OUTPATIENT
Start: 2024-12-06

## 2024-12-06 NOTE — TELEPHONE ENCOUNTER
PCP: Forylan Wallace APRN - NP    Last appt: 7/17/2024    Future Appointments   Date Time Provider Department Center   1/22/2025  8:30 AM Froylan Wallace APRN - NP [facility] DEP       Requested Prescriptions     Pending Prescriptions Disp Refills    losartan (COZAAR) 100 MG tablet [Pharmacy Med Name: LOSARTAN POTASSIUM 100 MG TAB] 90 tablet 1     Sig: TAKE 1 TABLET BY MOUTH EVERY DAY

## 2024-12-23 DIAGNOSIS — F41.1 GENERALIZED ANXIETY DISORDER: ICD-10-CM

## 2024-12-24 RX ORDER — CLONAZEPAM 1 MG/1
1 TABLET ORAL NIGHTLY PRN
Qty: 90 TABLET | Refills: 0 | Status: SHIPPED | OUTPATIENT
Start: 2024-12-24 | End: 2025-06-22

## 2024-12-24 NOTE — TELEPHONE ENCOUNTER
PCP: Froylan Wallace APRN - NP    Last appt: 7/17/2024  Future Appointments   Date Time Provider Department Center   1/22/2025  8:30 AM Froylan Wallace APRN - NP [facility]       Requested Prescriptions     Pending Prescriptions Disp Refills    clonazePAM (KLONOPIN) 1 MG tablet [Pharmacy Med Name: CLONAZEPAM 1 MG TABLET] 90 tablet      Sig: Take 1 tablet by mouth nightly as needed for Anxiety for up to 180 days. Max Daily Amount: 1 mg       Prior labs and Blood pressures:  BP Readings from Last 3 Encounters:   10/30/24 120/80   07/17/24 116/76   01/16/24 122/74     Lab Results   Component Value Date/Time     07/17/2024 09:11 AM    K 4.6 07/17/2024 09:11 AM     07/17/2024 09:11 AM    CO2 25 07/17/2024 09:11 AM    BUN 16 07/17/2024 09:11 AM    GFRAA >60 07/11/2022 08:48 AM     No results found for: \"HBA1C\", \"YMQ9YMMC\"  Lab Results   Component Value Date/Time    CHOL 120 07/17/2024 09:11 AM    HDL 35 07/17/2024 09:11 AM    LDL 54.6 07/17/2024 09:11 AM    LDL 59.6 01/16/2024 08:42 AM    VLDL 30.4 07/17/2024 09:11 AM    VLDL 24 09/17/2020 08:55 AM     No results found for: \"VITD3\"        Lab Results   Component Value Date/Time    TSH 1.42 07/17/2024 09:11 AM

## 2025-01-19 SDOH — ECONOMIC STABILITY: FOOD INSECURITY: WITHIN THE PAST 12 MONTHS, YOU WORRIED THAT YOUR FOOD WOULD RUN OUT BEFORE YOU GOT MONEY TO BUY MORE.: NEVER TRUE

## 2025-01-19 SDOH — ECONOMIC STABILITY: TRANSPORTATION INSECURITY
IN THE PAST 12 MONTHS, HAS THE LACK OF TRANSPORTATION KEPT YOU FROM MEDICAL APPOINTMENTS OR FROM GETTING MEDICATIONS?: NO

## 2025-01-19 SDOH — ECONOMIC STABILITY: FOOD INSECURITY: WITHIN THE PAST 12 MONTHS, THE FOOD YOU BOUGHT JUST DIDN'T LAST AND YOU DIDN'T HAVE MONEY TO GET MORE.: NEVER TRUE

## 2025-01-19 SDOH — ECONOMIC STABILITY: INCOME INSECURITY: IN THE LAST 12 MONTHS, WAS THERE A TIME WHEN YOU WERE NOT ABLE TO PAY THE MORTGAGE OR RENT ON TIME?: NO

## 2025-01-22 ENCOUNTER — OFFICE VISIT (OUTPATIENT)
Facility: CLINIC | Age: 55
End: 2025-01-22
Payer: COMMERCIAL

## 2025-01-22 VITALS
SYSTOLIC BLOOD PRESSURE: 120 MMHG | HEIGHT: 70 IN | DIASTOLIC BLOOD PRESSURE: 82 MMHG | HEART RATE: 86 BPM | RESPIRATION RATE: 16 BRPM | TEMPERATURE: 97.6 F | OXYGEN SATURATION: 97 % | BODY MASS INDEX: 40 KG/M2 | WEIGHT: 279.4 LBS

## 2025-01-22 DIAGNOSIS — Z12.5 SCREENING FOR MALIGNANT NEOPLASM OF PROSTATE: ICD-10-CM

## 2025-01-22 DIAGNOSIS — F41.1 GENERALIZED ANXIETY DISORDER: ICD-10-CM

## 2025-01-22 DIAGNOSIS — J45.20 MILD INTERMITTENT ASTHMA WITHOUT COMPLICATION: ICD-10-CM

## 2025-01-22 DIAGNOSIS — I10 ESSENTIAL HYPERTENSION: Primary | ICD-10-CM

## 2025-01-22 DIAGNOSIS — E78.2 MIXED HYPERLIPIDEMIA: ICD-10-CM

## 2025-01-22 DIAGNOSIS — E66.813 CLASS 3 SEVERE OBESITY DUE TO EXCESS CALORIES WITH SERIOUS COMORBIDITY AND BODY MASS INDEX (BMI) OF 40.0 TO 44.9 IN ADULT: ICD-10-CM

## 2025-01-22 DIAGNOSIS — E66.01 CLASS 3 SEVERE OBESITY DUE TO EXCESS CALORIES WITH SERIOUS COMORBIDITY AND BODY MASS INDEX (BMI) OF 40.0 TO 44.9 IN ADULT: ICD-10-CM

## 2025-01-22 PROCEDURE — 99214 OFFICE O/P EST MOD 30 MIN: CPT | Performed by: NURSE PRACTITIONER

## 2025-01-22 PROCEDURE — 3074F SYST BP LT 130 MM HG: CPT | Performed by: NURSE PRACTITIONER

## 2025-01-22 PROCEDURE — 3079F DIAST BP 80-89 MM HG: CPT | Performed by: NURSE PRACTITIONER

## 2025-01-22 RX ORDER — ALBUTEROL SULFATE 90 UG/1
2 INHALANT RESPIRATORY (INHALATION) EVERY 4 HOURS PRN
Qty: 18 G | Refills: 5 | Status: SHIPPED | OUTPATIENT
Start: 2025-01-22

## 2025-01-22 SDOH — ECONOMIC STABILITY: FOOD INSECURITY

## 2025-01-22 NOTE — PROGRESS NOTES
Chief Complaint   Patient presents with    6 Month Follow-Up       SUBJECTIVE:    Siva Barbour is a 54 y.o. male who is here today for a follow up appointment regarding current medical conditions including: Essential hypertension, mixed hyperlipidemia, generalized anxiety, asthma, and obesity.  He states he is feeling relatively well overall.  He is requesting a refill of his albuterol inhaler due to his asthma.  He is fasting today.    The patient remains on combination of amlodipine and losartan daily for management of his hypertension.  His blood pressures have remained stable and in good control overall.  He denies any adverse side effects of the medication.  He continues to take atorvastatin daily for management of his mixed hyperlipidemia and tolerates this well.  He has had no recent episodes of chest pain, chest pressure, headaches, dizziness, blurred vision, palpitations, or syncope episodes.    He remains on clonazepam and bupropion for management of his anxiety.  Feels the medications are effective and his symptoms have been stable.    He states the colder weather has been triggering his asthma lately.  He is requesting a refill of his albuterol inhaler if possible.  He denies any acute exacerbations or hospitalizations.      Current Outpatient Medications   Medication Sig Dispense Refill    clonazePAM (KLONOPIN) 1 MG tablet Take 1 tablet by mouth nightly as needed for Anxiety for up to 180 days. Max Daily Amount: 1 mg 90 tablet 0    losartan (COZAAR) 100 MG tablet TAKE 1 TABLET BY MOUTH EVERY DAY 90 tablet 1    buPROPion (WELLBUTRIN XL) 300 MG extended release tablet TAKE 1 TABLET BY MOUTH EVERY DAY 90 tablet 1    atorvastatin (LIPITOR) 10 MG tablet TAKE 1 TABLET BY MOUTH EVERY DAY 90 tablet 1    fexofenadine (ALLEGRA) 180 MG tablet TAKE 1 TABLET BY MOUTH EVERY DAY 90 tablet 1    amLODIPine (NORVASC) 5 MG tablet TAKE 1 TABLET BY MOUTH EVERY DAY 90 tablet 1    ipratropium (ATROVENT) 0.06 % nasal

## 2025-01-22 NOTE — PROGRESS NOTES
Siva Barbour is a 54 y.o. male    Chief Complaint   Patient presents with    6 Month Follow-Up     Vitals:    01/22/25 0851   BP: 120/82   Site: Left Upper Arm   Pulse: 86   Resp: 16   Temp: 97.6 °F (36.4 °C)   SpO2: 97%   Weight: 126.7 kg (279 lb 6.4 oz)   Height: 1.778 m (5' 10\")         Health Maintenance Due   Topic Date Due    Hepatitis B vaccine (1 of 3 - 19+ 3-dose series) Never done    Pneumococcal 0-64 years Vaccine (2 of 2 - PCV) 10/24/2012    Shingles vaccine (1 of 2) Never done    DTaP/Tdap/Td vaccine (2 - Td or Tdap) 10/17/2023    Colorectal Cancer Screen  12/17/2023    Flu vaccine (1) 08/01/2024    COVID-19 Vaccine (5 - 2023-24 season) 09/01/2024    Depression Monitoring  01/16/2025         \"Have you been to the ER, urgent care clinic since your last visit?  Hospitalized since your last visit?\"    NO    “Have you seen or consulted any other health care providers outside of [facility] since your last visit?”    NO    “Have you had a colorectal cancer screening such as a colonoscopy/FIT/Cologuard?    YES AUG 2024    Date of last Colonoscopy: 12/17/2018  No cologuard on file  No FIT/FOBT on file   No flexible sigmoidoscopy on file

## 2025-01-23 LAB
ALBUMIN SERPL-MCNC: 3.8 G/DL (ref 3.5–5)
ALBUMIN/GLOB SERPL: 1.2 (ref 1.1–2.2)
ALP SERPL-CCNC: 120 U/L (ref 45–117)
ALT SERPL-CCNC: 45 U/L (ref 12–78)
ANION GAP SERPL CALC-SCNC: 5 MMOL/L (ref 2–12)
AST SERPL-CCNC: 20 U/L (ref 15–37)
BILIRUB SERPL-MCNC: 0.5 MG/DL (ref 0.2–1)
BUN SERPL-MCNC: 16 MG/DL (ref 6–20)
BUN/CREAT SERPL: 13 (ref 12–20)
CALCIUM SERPL-MCNC: 9.1 MG/DL (ref 8.5–10.1)
CHLORIDE SERPL-SCNC: 109 MMOL/L (ref 97–108)
CHOLEST SERPL-MCNC: 109 MG/DL
CO2 SERPL-SCNC: 24 MMOL/L (ref 21–32)
CREAT SERPL-MCNC: 1.23 MG/DL (ref 0.7–1.3)
ERYTHROCYTE [DISTWIDTH] IN BLOOD BY AUTOMATED COUNT: 15.3 % (ref 11.5–14.5)
GLOBULIN SER CALC-MCNC: 3.3 G/DL (ref 2–4)
GLUCOSE SERPL-MCNC: 109 MG/DL (ref 65–100)
HCT VFR BLD AUTO: 48.5 % (ref 36.6–50.3)
HDLC SERPL-MCNC: 38 MG/DL
HDLC SERPL: 2.9 (ref 0–5)
HGB BLD-MCNC: 15.1 G/DL (ref 12.1–17)
LDLC SERPL CALC-MCNC: 49.8 MG/DL (ref 0–100)
MCH RBC QN AUTO: 27 PG (ref 26–34)
MCHC RBC AUTO-ENTMCNC: 31.1 G/DL (ref 30–36.5)
MCV RBC AUTO: 86.6 FL (ref 80–99)
NRBC # BLD: 0 K/UL (ref 0–0.01)
NRBC BLD-RTO: 0 PER 100 WBC
PLATELET # BLD AUTO: 223 K/UL (ref 150–400)
PMV BLD AUTO: 10.7 FL (ref 8.9–12.9)
POTASSIUM SERPL-SCNC: 4.7 MMOL/L (ref 3.5–5.1)
PROT SERPL-MCNC: 7.1 G/DL (ref 6.4–8.2)
PSA SERPL-MCNC: 0.4 NG/ML (ref 0.01–4)
RBC # BLD AUTO: 5.6 M/UL (ref 4.1–5.7)
SODIUM SERPL-SCNC: 138 MMOL/L (ref 136–145)
TRIGL SERPL-MCNC: 106 MG/DL
VLDLC SERPL CALC-MCNC: 21.2 MG/DL
WBC # BLD AUTO: 5.3 K/UL (ref 4.1–11.1)

## 2025-02-03 DIAGNOSIS — I10 ESSENTIAL (PRIMARY) HYPERTENSION: ICD-10-CM

## 2025-02-03 DIAGNOSIS — R09.81 NASAL CONGESTION: ICD-10-CM

## 2025-02-03 DIAGNOSIS — J30.89 ENVIRONMENTAL AND SEASONAL ALLERGIES: ICD-10-CM

## 2025-02-03 DIAGNOSIS — H93.13 TINNITUS OF BOTH EARS: ICD-10-CM

## 2025-02-03 RX ORDER — ATORVASTATIN CALCIUM 10 MG/1
TABLET, FILM COATED ORAL
Qty: 90 TABLET | Refills: 1 | Status: SHIPPED | OUTPATIENT
Start: 2025-02-03

## 2025-02-03 RX ORDER — AMLODIPINE BESYLATE 5 MG/1
5 TABLET ORAL DAILY
Qty: 90 TABLET | Refills: 1 | Status: SHIPPED | OUTPATIENT
Start: 2025-02-03

## 2025-02-03 RX ORDER — FEXOFENADINE HCL 180 MG/1
180 TABLET ORAL DAILY
Qty: 90 TABLET | Refills: 1 | Status: SHIPPED | OUTPATIENT
Start: 2025-02-03

## 2025-02-03 NOTE — TELEPHONE ENCOUNTER
PCP: Froylan Wallace APRN - NP    Last appt: 1/22/2025    Future Appointments   Date Time Provider Department Center   7/28/2025  8:30 AM Froylan Wallace APRN - NP [facility] DEP       Requested Prescriptions     Pending Prescriptions Disp Refills    atorvastatin (LIPITOR) 10 MG tablet [Pharmacy Med Name: ATORVASTATIN 10 MG TABLET] 90 tablet 1     Sig: TAKE 1 TABLET BY MOUTH EVERY DAY    amLODIPine (NORVASC) 5 MG tablet [Pharmacy Med Name: AMLODIPINE BESYLATE 5 MG TAB] 90 tablet 1     Sig: TAKE 1 TABLET BY MOUTH EVERY DAY    fexofenadine (ALLEGRA) 180 MG tablet [Pharmacy Med Name: FEXOFENADINE  MG TABLET] 90 tablet 1     Sig: TAKE 1 TABLET BY MOUTH EVERY DAY

## 2025-03-14 RX ORDER — BUPROPION HYDROCHLORIDE 300 MG/1
300 TABLET ORAL DAILY
Qty: 90 TABLET | Refills: 1 | Status: SHIPPED | OUTPATIENT
Start: 2025-03-14

## 2025-03-14 NOTE — TELEPHONE ENCOUNTER
PCP: Froylan Wallace APRN - NP    Last appt: 1/22/2025    Future Appointments   Date Time Provider Department Center   7/28/2025  8:30 AM Froylan Wallace APRN - NP [facility] DEP       Requested Prescriptions     Pending Prescriptions Disp Refills    buPROPion (WELLBUTRIN XL) 300 MG extended release tablet [Pharmacy Med Name: BUPROPION HCL  MG TABLET] 90 tablet 1     Sig: TAKE 1 TABLET BY MOUTH EVERY DAY

## 2025-03-23 DIAGNOSIS — F41.1 GENERALIZED ANXIETY DISORDER: ICD-10-CM

## 2025-03-24 NOTE — TELEPHONE ENCOUNTER
PCP: Froylan Wallace APRN - NP    Last appt: 1/22/2025    Future Appointments   Date Time Provider Department Center   7/28/2025  8:30 AM Froylan Wallace APRN - NP [facility] DEP       Requested Prescriptions     Pending Prescriptions Disp Refills    clonazePAM (KLONOPIN) 1 MG tablet 90 tablet 0     Sig: Take 1 tablet by mouth nightly as needed for Anxiety for up to 180 days. Max Daily Amount: 1 mg

## 2025-03-25 RX ORDER — CLONAZEPAM 1 MG/1
1 TABLET ORAL NIGHTLY PRN
Qty: 90 TABLET | Refills: 0 | Status: SHIPPED | OUTPATIENT
Start: 2025-03-25 | End: 2025-09-21

## 2025-06-21 DIAGNOSIS — F41.1 GENERALIZED ANXIETY DISORDER: ICD-10-CM

## 2025-06-23 RX ORDER — CLONAZEPAM 1 MG/1
1 TABLET ORAL NIGHTLY PRN
Qty: 90 TABLET | Refills: 0 | Status: SHIPPED | OUTPATIENT
Start: 2025-06-23 | End: 2025-12-20

## 2025-06-23 RX ORDER — LOSARTAN POTASSIUM 100 MG/1
100 TABLET ORAL DAILY
Qty: 90 TABLET | Refills: 1 | Status: SHIPPED | OUTPATIENT
Start: 2025-06-23

## 2025-06-23 NOTE — TELEPHONE ENCOUNTER
PCP: Froylan Wallace APRN - NP    Last appt: 1/22/2025    Future Appointments   Date Time Provider Department Center   7/28/2025  8:30 AM Froylan Wallace APRN - NP [facility] DEP       Requested Prescriptions     Pending Prescriptions Disp Refills    losartan (COZAAR) 100 MG tablet [Pharmacy Med Name: LOSARTAN POTASSIUM 100 MG TAB] 90 tablet 1     Sig: TAKE 1 TABLET BY MOUTH EVERY DAY    clonazePAM (KLONOPIN) 1 MG tablet [Pharmacy Med Name: CLONAZEPAM 1 MG TABLET] 90 tablet 0     Sig: Take 1 tablet by mouth nightly as needed for Anxiety for up to 180 days. Max Daily Amount: 1 mg

## 2025-07-28 ENCOUNTER — OFFICE VISIT (OUTPATIENT)
Facility: CLINIC | Age: 55
End: 2025-07-28
Payer: COMMERCIAL

## 2025-07-28 VITALS
OXYGEN SATURATION: 97 % | WEIGHT: 278 LBS | RESPIRATION RATE: 16 BRPM | HEIGHT: 70 IN | DIASTOLIC BLOOD PRESSURE: 74 MMHG | BODY MASS INDEX: 39.8 KG/M2 | HEART RATE: 89 BPM | TEMPERATURE: 98.5 F | SYSTOLIC BLOOD PRESSURE: 116 MMHG

## 2025-07-28 DIAGNOSIS — Z00.00 ROUTINE PHYSICAL EXAMINATION: Primary | ICD-10-CM

## 2025-07-28 DIAGNOSIS — E66.01 CLASS 2 SEVERE OBESITY DUE TO EXCESS CALORIES WITH SERIOUS COMORBIDITY AND BODY MASS INDEX (BMI) OF 39.0 TO 39.9 IN ADULT (HCC): ICD-10-CM

## 2025-07-28 DIAGNOSIS — E78.2 MIXED HYPERLIPIDEMIA: ICD-10-CM

## 2025-07-28 DIAGNOSIS — E66.812 CLASS 2 SEVERE OBESITY DUE TO EXCESS CALORIES WITH SERIOUS COMORBIDITY AND BODY MASS INDEX (BMI) OF 39.0 TO 39.9 IN ADULT (HCC): ICD-10-CM

## 2025-07-28 DIAGNOSIS — F41.1 GENERALIZED ANXIETY DISORDER: ICD-10-CM

## 2025-07-28 DIAGNOSIS — J45.20 MILD INTERMITTENT ASTHMA WITHOUT COMPLICATION: ICD-10-CM

## 2025-07-28 DIAGNOSIS — I10 ESSENTIAL HYPERTENSION: ICD-10-CM

## 2025-07-28 LAB
ALBUMIN SERPL-MCNC: 3.9 G/DL (ref 3.5–5)
ALBUMIN/GLOB SERPL: 1.3 (ref 1.1–2.2)
ALP SERPL-CCNC: 128 U/L (ref 45–117)
ALT SERPL-CCNC: 49 U/L (ref 12–78)
ANION GAP SERPL CALC-SCNC: 10 MMOL/L (ref 2–12)
AST SERPL-CCNC: 38 U/L (ref 15–37)
BASOPHILS # BLD: 0.06 K/UL (ref 0–0.1)
BASOPHILS NFR BLD: 0.8 % (ref 0–1)
BILIRUB SERPL-MCNC: 0.5 MG/DL (ref 0.2–1)
BUN SERPL-MCNC: 14 MG/DL (ref 6–20)
BUN/CREAT SERPL: 13 (ref 12–20)
CALCIUM SERPL-MCNC: 8.8 MG/DL (ref 8.5–10.1)
CHLORIDE SERPL-SCNC: 107 MMOL/L (ref 97–108)
CHOLEST SERPL-MCNC: 102 MG/DL
CO2 SERPL-SCNC: 21 MMOL/L (ref 21–32)
CREAT SERPL-MCNC: 1.07 MG/DL (ref 0.7–1.3)
DIFFERENTIAL METHOD BLD: ABNORMAL
EOSINOPHIL # BLD: 0.21 K/UL (ref 0–0.4)
EOSINOPHIL NFR BLD: 2.7 % (ref 0–7)
ERYTHROCYTE [DISTWIDTH] IN BLOOD BY AUTOMATED COUNT: 14.6 % (ref 11.5–14.5)
GLOBULIN SER CALC-MCNC: 2.9 G/DL (ref 2–4)
GLUCOSE SERPL-MCNC: 123 MG/DL (ref 65–100)
HCT VFR BLD AUTO: 49.6 % (ref 36.6–50.3)
HDLC SERPL-MCNC: 33 MG/DL
HDLC SERPL: 3.1 (ref 0–5)
HGB BLD-MCNC: 16.1 G/DL (ref 12.1–17)
IMM GRANULOCYTES # BLD AUTO: 0.03 K/UL (ref 0–0.04)
IMM GRANULOCYTES NFR BLD AUTO: 0.4 % (ref 0–0.5)
LDLC SERPL CALC-MCNC: 7.8 MG/DL (ref 0–100)
LYMPHOCYTES # BLD: 0.84 K/UL (ref 0.8–3.5)
LYMPHOCYTES NFR BLD: 10.8 % (ref 12–49)
MCH RBC QN AUTO: 28.8 PG (ref 26–34)
MCHC RBC AUTO-ENTMCNC: 32.5 G/DL (ref 30–36.5)
MCV RBC AUTO: 88.7 FL (ref 80–99)
MONOCYTES # BLD: 0.43 K/UL (ref 0–1)
MONOCYTES NFR BLD: 5.5 % (ref 5–13)
NEUTS SEG # BLD: 6.2 K/UL (ref 1.8–8)
NEUTS SEG NFR BLD: 79.8 % (ref 32–75)
NRBC # BLD: 0 K/UL (ref 0–0.01)
NRBC BLD-RTO: 0 PER 100 WBC
PLATELET # BLD AUTO: 195 K/UL (ref 150–400)
PMV BLD AUTO: 10.3 FL (ref 8.9–12.9)
POTASSIUM SERPL-SCNC: 4 MMOL/L (ref 3.5–5.1)
PROT SERPL-MCNC: 6.8 G/DL (ref 6.4–8.2)
RBC # BLD AUTO: 5.59 M/UL (ref 4.1–5.7)
SODIUM SERPL-SCNC: 138 MMOL/L (ref 136–145)
TRIGL SERPL-MCNC: 306 MG/DL
VLDLC SERPL CALC-MCNC: 61.2 MG/DL
WBC # BLD AUTO: 7.8 K/UL (ref 4.1–11.1)

## 2025-07-28 PROCEDURE — 99396 PREV VISIT EST AGE 40-64: CPT | Performed by: NURSE PRACTITIONER

## 2025-07-28 PROCEDURE — 3074F SYST BP LT 130 MM HG: CPT | Performed by: NURSE PRACTITIONER

## 2025-07-28 PROCEDURE — 3078F DIAST BP <80 MM HG: CPT | Performed by: NURSE PRACTITIONER

## 2025-07-28 ASSESSMENT — PATIENT HEALTH QUESTIONNAIRE - PHQ9
SUM OF ALL RESPONSES TO PHQ QUESTIONS 1-9: 2
SUM OF ALL RESPONSES TO PHQ QUESTIONS 1-9: 2
5. POOR APPETITE OR OVEREATING: NOT AT ALL
SUM OF ALL RESPONSES TO PHQ QUESTIONS 1-9: 2
3. TROUBLE FALLING OR STAYING ASLEEP: NOT AT ALL
SUM OF ALL RESPONSES TO PHQ QUESTIONS 1-9: 2
8. MOVING OR SPEAKING SO SLOWLY THAT OTHER PEOPLE COULD HAVE NOTICED. OR THE OPPOSITE, BEING SO FIGETY OR RESTLESS THAT YOU HAVE BEEN MOVING AROUND A LOT MORE THAN USUAL: NOT AT ALL
6. FEELING BAD ABOUT YOURSELF - OR THAT YOU ARE A FAILURE OR HAVE LET YOURSELF OR YOUR FAMILY DOWN: NOT AT ALL
2. FEELING DOWN, DEPRESSED OR HOPELESS: SEVERAL DAYS
4. FEELING TIRED OR HAVING LITTLE ENERGY: NOT AT ALL
1. LITTLE INTEREST OR PLEASURE IN DOING THINGS: SEVERAL DAYS
7. TROUBLE CONCENTRATING ON THINGS, SUCH AS READING THE NEWSPAPER OR WATCHING TELEVISION: NOT AT ALL
10. IF YOU CHECKED OFF ANY PROBLEMS, HOW DIFFICULT HAVE THESE PROBLEMS MADE IT FOR YOU TO DO YOUR WORK, TAKE CARE OF THINGS AT HOME, OR GET ALONG WITH OTHER PEOPLE: NOT DIFFICULT AT ALL
9. THOUGHTS THAT YOU WOULD BE BETTER OFF DEAD, OR OF HURTING YOURSELF: NOT AT ALL

## 2025-07-28 NOTE — PROGRESS NOTES
Chief Complaint   Patient presents with    Annual Exam       SUBJECTIVE:    Siva Barbour is a 55 y.o. male who is here today for a annual physical exam as well as follow up appointment regarding current medical conditions including: Essential hypertension, mixed hyperlipidemia, asthma, generalized anxiety, and severe obesity.    Patient states he is not particularly watchful of his diet and is currently not exercising.  He remains on losartan and amlodipine daily for management of his hypertension.  His blood pressure has remained stable and in good control overall.  He continues to take atorvastatin daily for management of his mixed hyperlipidemia and tolerates this well.  He has had no recent episodes of chest pain, chest pressure, shortness of breath, headaches, dizziness, blurred vision, palpitations, or syncope episodes.    He continues to take a combination of bupropion XL as well as clonazepam for management of generalized anxiety.  His symptoms have been relatively stable and without issue.  He continues to use albuterol as needed for asthma.  He has had no acute exacerbations or hospitalizations since his last appointment.      Current Outpatient Medications   Medication Sig Dispense Refill    losartan (COZAAR) 100 MG tablet TAKE 1 TABLET BY MOUTH EVERY DAY 90 tablet 1    clonazePAM (KLONOPIN) 1 MG tablet Take 1 tablet by mouth nightly as needed for Anxiety for up to 180 days. Max Daily Amount: 1 mg 90 tablet 0    buPROPion (WELLBUTRIN XL) 300 MG extended release tablet TAKE 1 TABLET BY MOUTH EVERY DAY 90 tablet 1    atorvastatin (LIPITOR) 10 MG tablet TAKE 1 TABLET BY MOUTH EVERY DAY 90 tablet 1    amLODIPine (NORVASC) 5 MG tablet TAKE 1 TABLET BY MOUTH EVERY DAY 90 tablet 1    fexofenadine (ALLEGRA) 180 MG tablet TAKE 1 TABLET BY MOUTH EVERY DAY 90 tablet 1    albuterol sulfate HFA (PROVENTIL;VENTOLIN;PROAIR) 108 (90 Base) MCG/ACT inhaler Inhale 2 puffs into the lungs every 4 hours as needed for

## 2025-07-28 NOTE — PROGRESS NOTES
Siva Barbour is a 55 y.o. male     Chief Complaint   Patient presents with    Annual Exam       /74   Pulse 89   Temp 98.5 °F (36.9 °C) (Oral)   Resp 16   Ht 1.778 m (5' 10\")   Wt 126.1 kg (278 lb)   SpO2 97%   BMI 39.89 kg/m²     Health Maintenance Due   Topic Date Due    Hepatitis B vaccine (1 of 3 - 19+ 3-dose series) Never done    Pneumococcal 50+ years Vaccine (2 of 2 - PCV) 10/24/2012    Shingles vaccine (1 of 2) Never done    DTaP/Tdap/Td vaccine (2 - Td or Tdap) 10/17/2023    Colorectal Cancer Screen  12/17/2023    COVID-19 Vaccine (5 - 2024-25 season) 09/01/2024    Depression Monitoring  01/16/2025         \"Have you been to the ER, urgent care clinic since your last visit?  Hospitalized since your last visit?\"    NO    “Have you seen or consulted any other health care providers outside of [facility] since your last visit?”    NO    “Have you had a colorectal cancer screening such as a colonoscopy/FIT/Cologuard?    YES - Type: Colonoscopy - Where: [facility] Nurse/CMA to request most recent records if not in the chart     Date of last Colonoscopy: 12/17/2018  No cologuard on file  No FIT/FOBT on file   No flexible sigmoidoscopy on file

## 2025-08-10 DIAGNOSIS — J30.89 ENVIRONMENTAL AND SEASONAL ALLERGIES: ICD-10-CM

## 2025-08-10 DIAGNOSIS — H93.13 TINNITUS OF BOTH EARS: ICD-10-CM

## 2025-08-10 DIAGNOSIS — R09.81 NASAL CONGESTION: ICD-10-CM

## 2025-08-10 DIAGNOSIS — I10 ESSENTIAL (PRIMARY) HYPERTENSION: ICD-10-CM

## 2025-08-11 RX ORDER — ATORVASTATIN CALCIUM 10 MG/1
10 TABLET, FILM COATED ORAL DAILY
Qty: 90 TABLET | Refills: 1 | Status: SHIPPED | OUTPATIENT
Start: 2025-08-11

## 2025-08-11 RX ORDER — FEXOFENADINE HCL 180 MG/1
180 TABLET ORAL DAILY
Qty: 90 TABLET | Refills: 1 | Status: SHIPPED | OUTPATIENT
Start: 2025-08-11

## 2025-08-11 RX ORDER — AMLODIPINE BESYLATE 5 MG/1
5 TABLET ORAL DAILY
Qty: 90 TABLET | Refills: 1 | Status: SHIPPED | OUTPATIENT
Start: 2025-08-11